# Patient Record
Sex: FEMALE | Race: ASIAN | Employment: OTHER | URBAN - METROPOLITAN AREA
[De-identification: names, ages, dates, MRNs, and addresses within clinical notes are randomized per-mention and may not be internally consistent; named-entity substitution may affect disease eponyms.]

---

## 2018-05-03 NOTE — H&P
[address] [facility] [address] Gastroenterology                                                                                                  Clinic History and Physical     Pa (Active prior to today's visit):    Current Outpatient Prescriptions:  simvastatin 10 MG Oral Tab Take 10 mg by mouth nightly. Disp:  Rfl:    Multiple Vitamins-Minerals (MULTI-VITAMIN/MINERALS) Oral Tab Take 1 tablet by mouth daily.  Disp:  Rfl:    DHA-EPA- reviewed      Labs/Imaging:     Patient's labs and imaging were reviewed and discussed with patient today. See HPI and A&P for further details.       ASSESSMENT/PLAN:   Liliya Briseno is a 59year old year-old female pt of Dr. Gordy Junior with history of hyperlipid DORI Nazario  5/3/2018

## 2018-05-08 ENCOUNTER — HOSPITAL ENCOUNTER (OUTPATIENT)
Dept: MAMMOGRAPHY | Facility: HOSPITAL | Age: 58
Discharge: HOME | End: 2018-05-08
Attending: OBSTETRICS & GYNECOLOGY
Payer: COMMERCIAL

## 2018-05-08 DIAGNOSIS — Z12.39 ENCOUNTER FOR SCREENING FOR MALIGNANT NEOPLASM OF BREAST: ICD-10-CM

## 2018-05-08 PROCEDURE — 77067 SCR MAMMO BI INCL CAD: CPT | Performed by: OBSTETRICS & GYNECOLOGY

## 2018-05-09 ENCOUNTER — OFFICE VISIT (OUTPATIENT)
Dept: GASTROENTEROLOGY | Facility: CLINIC | Age: 58
End: 2018-05-09

## 2018-05-09 ENCOUNTER — TELEPHONE (OUTPATIENT)
Dept: GASTROENTEROLOGY | Facility: CLINIC | Age: 58
End: 2018-05-09

## 2018-05-09 VITALS
SYSTOLIC BLOOD PRESSURE: 138 MMHG | BODY MASS INDEX: 26.98 KG/M2 | WEIGHT: 158 LBS | DIASTOLIC BLOOD PRESSURE: 90 MMHG | HEART RATE: 108 BPM | HEIGHT: 64 IN

## 2018-05-09 DIAGNOSIS — Z12.11 SCREENING FOR COLON CANCER: Primary | ICD-10-CM

## 2018-05-09 DIAGNOSIS — Z12.11 SCREEN FOR COLON CANCER: Primary | ICD-10-CM

## 2018-05-09 PROCEDURE — 99212 OFFICE O/P EST SF 10 MIN: CPT | Performed by: NURSE PRACTITIONER

## 2018-05-09 PROCEDURE — 99203 OFFICE O/P NEW LOW 30 MIN: CPT | Performed by: NURSE PRACTITIONER

## 2018-05-09 RX ORDER — SIMVASTATIN 10 MG
10 TABLET ORAL NIGHTLY
COMMUNITY

## 2018-05-09 RX ORDER — MULTIVIT-MIN/IRON FUM/FOLIC AC 7.5 MG-4
1 TABLET ORAL DAILY
COMMUNITY

## 2018-05-09 NOTE — H&P
Gastroenterology Attending; This is a 49-year-old female patient of Dr. Sandhya Jett who had recent consultation with DORI Dee for a colorectal screening evaluation. .  I have reviewed her thorough consultation.   I agree with the current assessment

## 2018-05-09 NOTE — TELEPHONE ENCOUNTER
Scheduled for:  Colonoscopy 32852  Provider Name: Dr. Brittni Alvarez  Date:  5/18/19  Location:  [facility]  Sedation:  IV  Time:  7:30 am, arrival 6:30 am  Prep: Colyte  Meds/Allergies Reconciled?:  DORI Dawkins reviewed  Diagnosis with codes:  Screening Z12.11  Was

## 2018-05-09 NOTE — PATIENT INSTRUCTIONS
1. Schedule colonoscopy with Dr. Kevin Alatorre w/ IV Twilight     2.  bowel prep from pharmacy - split dose Colyte     3. Continue all medications for procedure     4. Read all bowel prep instructions carefully    5.  AVOID seeds, nuts, popcorn, raw fruits an

## 2018-05-18 ENCOUNTER — SURGERY (OUTPATIENT)
Age: 58
End: 2018-05-18

## 2018-05-18 ENCOUNTER — HOSPITAL ENCOUNTER (OUTPATIENT)
Facility: HOSPITAL | Age: 58
Setting detail: HOSPITAL OUTPATIENT SURGERY
Discharge: HOME | End: 2018-05-18
Attending: INTERNAL MEDICINE | Admitting: INTERNAL MEDICINE
Payer: COMMERCIAL

## 2018-05-18 VITALS
BODY MASS INDEX: 26.98 KG/M2 | RESPIRATION RATE: 14 BRPM | OXYGEN SATURATION: 99 % | WEIGHT: 158 LBS | HEIGHT: 64 IN | DIASTOLIC BLOOD PRESSURE: 72 MMHG | SYSTOLIC BLOOD PRESSURE: 112 MMHG | HEART RATE: 60 BPM

## 2018-05-18 DIAGNOSIS — Z12.11 SCREEN FOR COLON CANCER: ICD-10-CM

## 2018-05-18 PROBLEM — K57.30 DIVERTICULOSIS LARGE INTESTINE W/O PERFORATION OR ABSCESS W/O BLEEDING: Status: ACTIVE | Noted: 2018-05-18

## 2018-05-18 PROBLEM — K64.8 INTERNAL HEMORRHOIDS: Status: ACTIVE | Noted: 2018-05-18

## 2018-05-18 PROCEDURE — G0500 MOD SEDAT ENDO SERVICE >5YRS: HCPCS | Performed by: INTERNAL MEDICINE

## 2018-05-18 PROCEDURE — 45378 DIAGNOSTIC COLONOSCOPY: CPT | Performed by: INTERNAL MEDICINE

## 2018-05-18 PROCEDURE — 0DJD8ZZ INSPECTION OF LOWER INTESTINAL TRACT, VIA NATURAL OR ARTIFICIAL OPENING ENDOSCOPIC: ICD-10-PCS | Performed by: INTERNAL MEDICINE

## 2018-05-18 RX ORDER — SODIUM CHLORIDE 0.9 % (FLUSH) 0.9 %
10 SYRINGE (ML) INJECTION AS NEEDED
Status: DISCONTINUED | OUTPATIENT
Start: 2018-05-18 | End: 2018-05-18

## 2018-05-18 RX ORDER — MIDAZOLAM HYDROCHLORIDE 1 MG/ML
INJECTION INTRAMUSCULAR; INTRAVENOUS
Status: DISCONTINUED | OUTPATIENT
Start: 2018-05-18 | End: 2018-05-18

## 2018-05-18 RX ORDER — MIDAZOLAM HYDROCHLORIDE 1 MG/ML
1 INJECTION INTRAMUSCULAR; INTRAVENOUS EVERY 5 MIN PRN
Status: DISCONTINUED | OUTPATIENT
Start: 2018-05-18 | End: 2018-05-18

## 2018-05-18 RX ORDER — SODIUM CHLORIDE, SODIUM LACTATE, POTASSIUM CHLORIDE, CALCIUM CHLORIDE 600; 310; 30; 20 MG/100ML; MG/100ML; MG/100ML; MG/100ML
INJECTION, SOLUTION INTRAVENOUS CONTINUOUS
Status: DISCONTINUED | OUTPATIENT
Start: 2018-05-18 | End: 2018-05-18

## 2018-05-18 NOTE — H&P
History & Physical Examination    Patient Name: Pastor Villarreal  MRN: [id number]  CSN: [id number]  YOB: 1960    Diagnosis: colorectal screening      Prescriptions Prior to Admission:  PATIENT SUPPLIED MEDICATION  Disp:  Rfl:     simvastatin 10 MG plan of care. I have reviewed the History and Physical done within the last 30 days. Any changes noted above.     31 Stefanie David - Gastroenterology  5/18/2018  7:27 AM

## 2018-05-18 NOTE — OPERATIVE REPORT
[facility]    PATIENT'S NAME: Kwasi Olivares   ATTENDING PHYSICIAN: Niko Conway MD   OPERATING PHYSICIAN: Niko Conway MD   PATIENT ACCOUNT#:   [id number]    LOCATION:  [facility] ENDO POOL ROOM 3 [address] 1.   Diverticular disease, uncomplicated, left-sided. 2.   Internal hemorrhoids. RECOMMENDATION:  Next screening colonoscopy in 10 years unless other signs or symptoms develop in the interim.     Dictated By Fercho Toledo MD  d: 05/18/

## 2018-05-18 NOTE — BRIEF OP NOTE
Pre-Operative Diagnosis: Screen for colon cancer     Post-Operative Diagnosis:   Diverticulosis, internal hemorrhoids     Procedure Performed:   Procedure(s):  COLONOSCOPY    Surgeon(s) and Role:     * Awa Barreto MD - Primary    Assistant(

## 2018-05-21 ENCOUNTER — TELEPHONE (OUTPATIENT)
Dept: GASTROENTEROLOGY | Facility: CLINIC | Age: 58
End: 2018-05-21

## 2018-05-21 NOTE — TELEPHONE ENCOUNTER
Entered into EPIC:Recall colon in 10 years per Dr. Alberto Salgado. Last Colon done 5/18/18, next due 5/18/28. Snapshot updated.

## 2019-07-31 ENCOUNTER — HOSPITAL ENCOUNTER (OUTPATIENT)
Dept: MAMMOGRAPHY | Facility: HOSPITAL | Age: 59
Discharge: HOME | End: 2019-07-31
Attending: OBSTETRICS & GYNECOLOGY
Payer: COMMERCIAL

## 2019-07-31 DIAGNOSIS — Z12.31 VISIT FOR SCREENING MAMMOGRAM: ICD-10-CM

## 2019-07-31 PROCEDURE — 77067 SCR MAMMO BI INCL CAD: CPT | Performed by: OBSTETRICS & GYNECOLOGY

## 2019-07-31 PROCEDURE — 77063 BREAST TOMOSYNTHESIS BI: CPT | Performed by: OBSTETRICS & GYNECOLOGY

## 2019-08-05 ENCOUNTER — HOSPITAL ENCOUNTER (OUTPATIENT)
Dept: BONE DENSITY | Age: 59
Discharge: HOME | End: 2019-08-05
Attending: OBSTETRICS & GYNECOLOGY
Payer: COMMERCIAL

## 2019-08-05 DIAGNOSIS — S82.90XA: ICD-10-CM

## 2019-08-05 PROCEDURE — 77080 DXA BONE DENSITY AXIAL: CPT | Performed by: OBSTETRICS & GYNECOLOGY

## 2021-01-22 ENCOUNTER — HOSPITAL ENCOUNTER (OUTPATIENT)
Dept: MAMMOGRAPHY | Facility: HOSPITAL | Age: 61
Discharge: HOME | End: 2021-01-22
Attending: OBSTETRICS & GYNECOLOGY
Payer: COMMERCIAL

## 2021-01-22 ENCOUNTER — HOSPITAL ENCOUNTER (OUTPATIENT)
Dept: ULTRASOUND IMAGING | Facility: HOSPITAL | Age: 61
Discharge: HOME | End: 2021-01-22
Attending: OBSTETRICS & GYNECOLOGY
Payer: COMMERCIAL

## 2021-01-22 DIAGNOSIS — N64.4 MASTODYNIA OF RIGHT BREAST: ICD-10-CM

## 2021-01-22 PROCEDURE — 77066 DX MAMMO INCL CAD BI: CPT | Performed by: OBSTETRICS & GYNECOLOGY

## 2021-01-22 PROCEDURE — 77062 BREAST TOMOSYNTHESIS BI: CPT | Performed by: OBSTETRICS & GYNECOLOGY

## 2021-01-22 PROCEDURE — 76642 ULTRASOUND BREAST LIMITED: CPT | Performed by: OBSTETRICS & GYNECOLOGY

## 2022-03-17 ENCOUNTER — HOSPITAL ENCOUNTER (OUTPATIENT)
Dept: MAMMOGRAPHY | Facility: HOSPITAL | Age: 62
Discharge: HOME | End: 2022-03-17
Attending: OBSTETRICS & GYNECOLOGY
Payer: COMMERCIAL

## 2022-03-17 DIAGNOSIS — Z12.31 ENCOUNTER FOR SCREENING MAMMOGRAM FOR MALIGNANT NEOPLASM OF BREAST: ICD-10-CM

## 2022-03-17 PROCEDURE — 77063 BREAST TOMOSYNTHESIS BI: CPT | Performed by: OBSTETRICS & GYNECOLOGY

## 2022-03-17 PROCEDURE — 77067 SCR MAMMO BI INCL CAD: CPT | Performed by: OBSTETRICS & GYNECOLOGY

## 2023-01-27 RX ORDER — SIMVASTATIN 20 MG
20 TABLET ORAL NIGHTLY
COMMUNITY

## 2023-06-12 ENCOUNTER — APPOINTMENT (OUTPATIENT)
Dept: OBGYN | Age: 63
End: 2023-06-12

## 2023-06-28 ENCOUNTER — APPOINTMENT (OUTPATIENT)
Dept: OBGYN | Age: 63
End: 2023-06-28

## 2023-07-05 ENCOUNTER — CLINICAL ABSTRACT (OUTPATIENT)
Dept: OBGYN | Age: 63
End: 2023-07-05

## 2023-07-17 ENCOUNTER — OFFICE VISIT (OUTPATIENT)
Dept: OBGYN | Age: 63
End: 2023-07-17

## 2023-07-17 VITALS
DIASTOLIC BLOOD PRESSURE: 84 MMHG | HEART RATE: 96 BPM | WEIGHT: 129.08 LBS | SYSTOLIC BLOOD PRESSURE: 134 MMHG | BODY MASS INDEX: 22.04 KG/M2 | OXYGEN SATURATION: 100 % | TEMPERATURE: 97.6 F | HEIGHT: 64 IN

## 2023-07-17 DIAGNOSIS — Z78.0 POSTMENOPAUSAL STATUS, AGE-RELATED: Primary | ICD-10-CM

## 2023-07-17 DIAGNOSIS — Z12.31 SCREENING MAMMOGRAM FOR BREAST CANCER: ICD-10-CM

## 2023-07-17 PROCEDURE — G0101 CA SCREEN;PELVIC/BREAST EXAM: HCPCS | Performed by: OBSTETRICS & GYNECOLOGY

## 2023-07-17 RX ORDER — ASCORBIC ACID 500 MG
500 TABLET ORAL DAILY
COMMUNITY

## 2023-07-17 RX ORDER — THIAMINE MONONITRATE (VIT B1) 100 MG
100 TABLET ORAL DAILY
COMMUNITY

## 2023-07-17 RX ORDER — CHLORAL HYDRATE 500 MG
CAPSULE ORAL
COMMUNITY

## 2023-07-18 ENCOUNTER — APPOINTMENT (OUTPATIENT)
Dept: OBGYN | Age: 63
End: 2023-07-18

## 2023-07-18 ENCOUNTER — HOSPITAL ENCOUNTER (OUTPATIENT)
Dept: BONE DENSITY | Facility: HOSPITAL | Age: 63
Discharge: HOME | End: 2023-07-18
Attending: OBSTETRICS & GYNECOLOGY
Payer: MEDICARE

## 2023-07-18 DIAGNOSIS — Z78.0 POSTMENOPAUSE: ICD-10-CM

## 2023-07-18 PROCEDURE — 77080 DXA BONE DENSITY AXIAL: CPT | Performed by: OBSTETRICS & GYNECOLOGY

## 2023-07-19 ENCOUNTER — HOSPITAL ENCOUNTER (OUTPATIENT)
Dept: MAMMOGRAPHY | Facility: HOSPITAL | Age: 63
Discharge: HOME | End: 2023-07-19
Attending: OBSTETRICS & GYNECOLOGY
Payer: MEDICARE

## 2023-07-19 DIAGNOSIS — Z12.31 ENCOUNTER FOR SCREENING MAMMOGRAM FOR MALIGNANT NEOPLASM OF BREAST: ICD-10-CM

## 2023-07-19 LAB — HM MAMMOGRAPHY BILATERAL: NORMAL

## 2023-07-19 PROCEDURE — 77067 SCR MAMMO BI INCL CAD: CPT | Performed by: OBSTETRICS & GYNECOLOGY

## 2023-07-19 PROCEDURE — 77063 BREAST TOMOSYNTHESIS BI: CPT | Performed by: OBSTETRICS & GYNECOLOGY

## 2024-03-19 ENCOUNTER — APPOINTMENT (OUTPATIENT)
Dept: CT IMAGING | Facility: HOSPITAL | Age: 64
End: 2024-03-19
Attending: EMERGENCY MEDICINE
Payer: MEDICARE

## 2024-03-19 ENCOUNTER — HOSPITAL ENCOUNTER (INPATIENT)
Facility: HOSPITAL | Age: 64
LOS: 1 days | Discharge: ANOTHER HOSPITAL | End: 2024-03-20
Attending: EMERGENCY MEDICINE | Admitting: HOSPITALIST
Payer: MEDICARE

## 2024-03-19 DIAGNOSIS — S06.320A INTRAPARENCHYMAL HEMATOMA OF BRAIN, LEFT, WITHOUT LOSS OF CONSCIOUSNESS, INITIAL ENCOUNTER (HCC): Primary | ICD-10-CM

## 2024-03-19 DIAGNOSIS — I16.1 HYPERTENSIVE EMERGENCY: ICD-10-CM

## 2024-03-19 LAB — GLUCOSE BLDC GLUCOMTR-MCNC: 181 MG/DL (ref 70–99)

## 2024-03-19 PROCEDURE — 70450 CT HEAD/BRAIN W/O DYE: CPT | Performed by: EMERGENCY MEDICINE

## 2024-03-20 ENCOUNTER — APPOINTMENT (OUTPATIENT)
Dept: GENERAL RADIOLOGY | Facility: HOSPITAL | Age: 64
End: 2024-03-20
Attending: EMERGENCY MEDICINE
Payer: MEDICARE

## 2024-03-20 ENCOUNTER — APPOINTMENT (OUTPATIENT)
Dept: CT IMAGING | Facility: HOSPITAL | Age: 64
End: 2024-03-20
Attending: EMERGENCY MEDICINE
Payer: MEDICARE

## 2024-03-20 ENCOUNTER — HOSPITAL ENCOUNTER (INPATIENT)
Facility: HOSPITAL | Age: 64
LOS: 9 days | Discharge: NURSING, REHAB OR LONG-TERM CARE | End: 2024-03-29
Attending: INTERNAL MEDICINE | Admitting: INTERNAL MEDICINE
Payer: MEDICARE

## 2024-03-20 VITALS
HEART RATE: 89 BPM | HEIGHT: 67 IN | RESPIRATION RATE: 12 BRPM | DIASTOLIC BLOOD PRESSURE: 82 MMHG | OXYGEN SATURATION: 97 % | SYSTOLIC BLOOD PRESSURE: 127 MMHG | WEIGHT: 126.56 LBS | TEMPERATURE: 99 F | BODY MASS INDEX: 19.87 KG/M2

## 2024-03-20 DIAGNOSIS — T14.8XXA HEMATOMA: ICD-10-CM

## 2024-03-20 DIAGNOSIS — I62.9 INTRACRANIAL BLEED (HCC): Primary | ICD-10-CM

## 2024-03-20 PROBLEM — I16.1 HYPERTENSIVE EMERGENCY: Status: ACTIVE | Noted: 2024-03-20

## 2024-03-20 PROBLEM — S06.320A INTRAPARENCHYMAL HEMATOMA OF BRAIN, LEFT, WITHOUT LOSS OF CONSCIOUSNESS, INITIAL ENCOUNTER (HCC): Status: ACTIVE | Noted: 2024-03-20

## 2024-03-20 LAB
ALBUMIN SERPL-MCNC: 4.9 G/DL (ref 3.2–4.8)
ALP LIVER SERPL-CCNC: 66 U/L
ALT SERPL-CCNC: 15 U/L
ANION GAP SERPL CALC-SCNC: 7 MMOL/L (ref 0–18)
APTT PPP: 26.5 SECONDS (ref 23.3–35.6)
AST SERPL-CCNC: 25 U/L (ref ?–34)
ATRIAL RATE: 93 BPM
BASOPHILS # BLD AUTO: 0.07 X10(3) UL (ref 0–0.2)
BASOPHILS NFR BLD AUTO: 0.8 %
BILIRUB DIRECT SERPL-MCNC: <0.1 MG/DL (ref ?–0.3)
BILIRUB SERPL-MCNC: 0.5 MG/DL (ref 0.2–1.1)
BUN BLD-MCNC: 14 MG/DL (ref 9–23)
BUN/CREAT SERPL: 17.1 (ref 10–20)
CALCIUM BLD-MCNC: 10.2 MG/DL (ref 8.7–10.4)
CHLORIDE SERPL-SCNC: 108 MMOL/L (ref 98–112)
CO2 SERPL-SCNC: 26 MMOL/L (ref 21–32)
CREAT BLD-MCNC: 0.82 MG/DL
DEPRECATED RDW RBC AUTO: 46.4 FL (ref 35.1–46.3)
EGFRCR SERPLBLD CKD-EPI 2021: 79 ML/MIN/1.73M2 (ref 60–?)
EOSINOPHIL # BLD AUTO: 0.22 X10(3) UL (ref 0–0.7)
EOSINOPHIL NFR BLD AUTO: 2.4 %
ERYTHROCYTE [DISTWIDTH] IN BLOOD BY AUTOMATED COUNT: 14 % (ref 11–15)
EST. AVERAGE GLUCOSE BLD GHB EST-MCNC: 126 MG/DL (ref 68–126)
GLUCOSE BLD-MCNC: 184 MG/DL (ref 70–99)
GLUCOSE BLDC GLUCOMTR-MCNC: 111 MG/DL (ref 70–99)
GLUCOSE BLDC GLUCOMTR-MCNC: 118 MG/DL (ref 70–99)
GLUCOSE BLDC GLUCOMTR-MCNC: 128 MG/DL (ref 70–99)
HBA1C MFR BLD: 6 % (ref ?–5.7)
HCT VFR BLD AUTO: 41.3 %
HGB BLD-MCNC: 13 G/DL
IMM GRANULOCYTES # BLD AUTO: 0.03 X10(3) UL (ref 0–1)
IMM GRANULOCYTES NFR BLD: 0.3 %
INR BLD: 0.89 (ref 0.8–1.2)
LYMPHOCYTES # BLD AUTO: 4.05 X10(3) UL (ref 1–4)
LYMPHOCYTES NFR BLD AUTO: 44.2 %
MCH RBC QN AUTO: 28.3 PG (ref 26–34)
MCHC RBC AUTO-ENTMCNC: 31.5 G/DL (ref 31–37)
MCV RBC AUTO: 90 FL
MONOCYTES # BLD AUTO: 0.63 X10(3) UL (ref 0.1–1)
MONOCYTES NFR BLD AUTO: 6.9 %
NEUTROPHILS # BLD AUTO: 4.16 X10 (3) UL (ref 1.5–7.7)
NEUTROPHILS # BLD AUTO: 4.16 X10(3) UL (ref 1.5–7.7)
NEUTROPHILS NFR BLD AUTO: 45.4 %
OSMOLALITY SERPL CALC.SUM OF ELEC: 297 MOSM/KG (ref 275–295)
P AXIS: 22 DEGREES
P-R INTERVAL: 136 MS
PLATELET # BLD AUTO: 339 10(3)UL (ref 150–450)
POTASSIUM SERPL-SCNC: 4 MMOL/L (ref 3.5–5.1)
PROT SERPL-MCNC: 7.9 G/DL (ref 5.7–8.2)
PROTHROMBIN TIME: 12.5 SECONDS (ref 11.6–14.8)
Q-T INTERVAL: 364 MS
QRS DURATION: 66 MS
QTC CALCULATION (BEZET): 452 MS
R AXIS: 44 DEGREES
RBC # BLD AUTO: 4.59 X10(6)UL
SODIUM SERPL-SCNC: 141 MMOL/L (ref 136–145)
T AXIS: 41 DEGREES
VENTRICULAR RATE: 93 BPM
WBC # BLD AUTO: 9.2 X10(3) UL (ref 4–11)

## 2024-03-20 PROCEDURE — 99223 1ST HOSP IP/OBS HIGH 75: CPT | Performed by: INTERNAL MEDICINE

## 2024-03-20 PROCEDURE — 70498 CT ANGIOGRAPHY NECK: CPT | Performed by: EMERGENCY MEDICINE

## 2024-03-20 PROCEDURE — 99223 1ST HOSP IP/OBS HIGH 75: CPT | Performed by: STUDENT IN AN ORGANIZED HEALTH CARE EDUCATION/TRAINING PROGRAM

## 2024-03-20 PROCEDURE — 71045 X-RAY EXAM CHEST 1 VIEW: CPT | Performed by: EMERGENCY MEDICINE

## 2024-03-20 PROCEDURE — 70496 CT ANGIOGRAPHY HEAD: CPT | Performed by: EMERGENCY MEDICINE

## 2024-03-20 PROCEDURE — 99291 CRITICAL CARE FIRST HOUR: CPT | Performed by: HOSPITALIST

## 2024-03-20 PROCEDURE — 99291 CRITICAL CARE FIRST HOUR: CPT | Performed by: NEUROLOGICAL SURGERY

## 2024-03-20 PROCEDURE — 70450 CT HEAD/BRAIN W/O DYE: CPT | Performed by: EMERGENCY MEDICINE

## 2024-03-20 PROCEDURE — 99223 1ST HOSP IP/OBS HIGH 75: CPT | Performed by: OTHER

## 2024-03-20 PROCEDURE — 99239 HOSP IP/OBS DSCHRG MGMT >30: CPT | Performed by: HOSPITALIST

## 2024-03-20 RX ORDER — LABETALOL HYDROCHLORIDE 5 MG/ML
10 INJECTION, SOLUTION INTRAVENOUS EVERY 10 MIN PRN
Status: COMPLETED | OUTPATIENT
Start: 2024-03-20 | End: 2024-03-21

## 2024-03-20 RX ORDER — ACETAMINOPHEN 500 MG
500 TABLET ORAL EVERY 4 HOURS PRN
Status: DISCONTINUED | OUTPATIENT
Start: 2024-03-20 | End: 2024-03-22

## 2024-03-20 RX ORDER — BISACODYL 10 MG
10 SUPPOSITORY, RECTAL RECTAL
Status: DISCONTINUED | OUTPATIENT
Start: 2024-03-20 | End: 2024-03-20

## 2024-03-20 RX ORDER — ONDANSETRON 2 MG/ML
4 INJECTION INTRAMUSCULAR; INTRAVENOUS EVERY 6 HOURS PRN
Status: SHIPPED | COMMUNITY
Start: 2024-03-20 | End: 2024-03-29

## 2024-03-20 RX ORDER — BISACODYL 10 MG
10 SUPPOSITORY, RECTAL RECTAL
Status: DISCONTINUED | OUTPATIENT
Start: 2024-03-20 | End: 2024-03-29

## 2024-03-20 RX ORDER — SODIUM CHLORIDE 9 MG/ML
INJECTION, SOLUTION INTRAVENOUS CONTINUOUS
Status: DISCONTINUED | OUTPATIENT
Start: 2024-03-20 | End: 2024-03-26

## 2024-03-20 RX ORDER — SODIUM CHLORIDE 9 MG/ML
INJECTION, SOLUTION INTRAVENOUS CONTINUOUS
Status: SHIPPED | COMMUNITY
Start: 2024-03-20 | End: 2024-03-29

## 2024-03-20 RX ORDER — LABETALOL HYDROCHLORIDE 5 MG/ML
INJECTION, SOLUTION INTRAVENOUS
Status: COMPLETED
Start: 2024-03-20 | End: 2024-03-20

## 2024-03-20 RX ORDER — ACETAMINOPHEN 325 MG/1
650 TABLET ORAL EVERY 4 HOURS PRN
Status: DISCONTINUED | OUTPATIENT
Start: 2024-03-20 | End: 2024-03-20

## 2024-03-20 RX ORDER — AMLODIPINE BESYLATE 5 MG/1
5 TABLET ORAL DAILY
Status: SHIPPED | COMMUNITY
Start: 2024-03-20

## 2024-03-20 RX ORDER — BISACODYL 10 MG
10 SUPPOSITORY, RECTAL RECTAL
Status: SHIPPED | COMMUNITY
Start: 2024-03-20 | End: 2024-03-29

## 2024-03-20 RX ORDER — ACETAMINOPHEN 325 MG/1
650 TABLET ORAL EVERY 4 HOURS PRN
Status: SHIPPED | COMMUNITY
Start: 2024-03-20

## 2024-03-20 RX ORDER — HYDRALAZINE HYDROCHLORIDE 20 MG/ML
10 INJECTION INTRAMUSCULAR; INTRAVENOUS EVERY 2 HOUR PRN
Status: CANCELLED | OUTPATIENT
Start: 2024-03-20

## 2024-03-20 RX ORDER — POLYETHYLENE GLYCOL 3350 17 G/17G
17 POWDER, FOR SOLUTION ORAL DAILY PRN
Status: DISCONTINUED | OUTPATIENT
Start: 2024-03-20 | End: 2024-03-20

## 2024-03-20 RX ORDER — ONDANSETRON 2 MG/ML
4 INJECTION INTRAMUSCULAR; INTRAVENOUS EVERY 6 HOURS PRN
Status: CANCELLED | OUTPATIENT
Start: 2024-03-20

## 2024-03-20 RX ORDER — AMLODIPINE BESYLATE 5 MG/1
5 TABLET ORAL DAILY
Status: DISCONTINUED | OUTPATIENT
Start: 2024-03-20 | End: 2024-03-20

## 2024-03-20 RX ORDER — LABETALOL HYDROCHLORIDE 5 MG/ML
20 INJECTION, SOLUTION INTRAVENOUS ONCE
Status: COMPLETED | OUTPATIENT
Start: 2024-03-20 | End: 2024-03-20

## 2024-03-20 RX ORDER — METOCLOPRAMIDE HYDROCHLORIDE 5 MG/ML
10 INJECTION INTRAMUSCULAR; INTRAVENOUS EVERY 8 HOURS PRN
Status: CANCELLED | OUTPATIENT
Start: 2024-03-20

## 2024-03-20 RX ORDER — POLYETHYLENE GLYCOL 3350 17 G/17G
17 POWDER, FOR SOLUTION ORAL DAILY PRN
Status: DISCONTINUED | OUTPATIENT
Start: 2024-03-20 | End: 2024-03-29

## 2024-03-20 RX ORDER — LABETALOL HYDROCHLORIDE 5 MG/ML
10 INJECTION, SOLUTION INTRAVENOUS EVERY 10 MIN PRN
Status: DISCONTINUED | OUTPATIENT
Start: 2024-03-20 | End: 2024-03-20

## 2024-03-20 RX ORDER — PRAVASTATIN SODIUM 20 MG
20 TABLET ORAL NIGHTLY
Status: DISCONTINUED | OUTPATIENT
Start: 2024-03-20 | End: 2024-03-20

## 2024-03-20 RX ORDER — SENNOSIDES 8.6 MG
17.2 TABLET ORAL NIGHTLY PRN
Status: DISCONTINUED | OUTPATIENT
Start: 2024-03-20 | End: 2024-03-29

## 2024-03-20 RX ORDER — ACETAMINOPHEN 650 MG/1
650 SUPPOSITORY RECTAL EVERY 4 HOURS PRN
Status: DISCONTINUED | OUTPATIENT
Start: 2024-03-20 | End: 2024-03-22

## 2024-03-20 RX ORDER — SODIUM CHLORIDE 9 MG/ML
INJECTION, SOLUTION INTRAVENOUS CONTINUOUS
Status: DISCONTINUED | OUTPATIENT
Start: 2024-03-20 | End: 2024-03-20

## 2024-03-20 RX ORDER — HYDRALAZINE HYDROCHLORIDE 20 MG/ML
10 INJECTION INTRAMUSCULAR; INTRAVENOUS EVERY 2 HOUR PRN
Status: DISCONTINUED | OUTPATIENT
Start: 2024-03-20 | End: 2024-03-29

## 2024-03-20 RX ORDER — ACETAMINOPHEN 325 MG/1
650 TABLET ORAL EVERY 4 HOURS PRN
Status: DISCONTINUED | OUTPATIENT
Start: 2024-03-20 | End: 2024-03-22

## 2024-03-20 RX ORDER — METOCLOPRAMIDE HYDROCHLORIDE 5 MG/ML
10 INJECTION INTRAMUSCULAR; INTRAVENOUS EVERY 8 HOURS PRN
Status: DISCONTINUED | OUTPATIENT
Start: 2024-03-20 | End: 2024-03-29

## 2024-03-20 RX ORDER — LABETALOL HYDROCHLORIDE 5 MG/ML
10 INJECTION, SOLUTION INTRAVENOUS EVERY 10 MIN PRN
Status: CANCELLED | OUTPATIENT
Start: 2024-03-20

## 2024-03-20 RX ORDER — HYDRALAZINE HYDROCHLORIDE 20 MG/ML
10 INJECTION INTRAMUSCULAR; INTRAVENOUS EVERY 2 HOUR PRN
Status: DISCONTINUED | OUTPATIENT
Start: 2024-03-20 | End: 2024-03-20

## 2024-03-20 RX ORDER — MIDAZOLAM HYDROCHLORIDE 1 MG/ML
2 INJECTION INTRAMUSCULAR; INTRAVENOUS
Status: DISCONTINUED | OUTPATIENT
Start: 2024-03-20 | End: 2024-03-20

## 2024-03-20 RX ORDER — SENNOSIDES 8.6 MG
17.2 TABLET ORAL NIGHTLY PRN
Status: DISCONTINUED | OUTPATIENT
Start: 2024-03-20 | End: 2024-03-20

## 2024-03-20 RX ORDER — ACETAMINOPHEN 325 MG/1
650 TABLET ORAL EVERY 4 HOURS PRN
Status: CANCELLED | OUTPATIENT
Start: 2024-03-20

## 2024-03-20 RX ORDER — ENEMA 19; 7 G/133ML; G/133ML
1 ENEMA RECTAL ONCE AS NEEDED
Status: DISCONTINUED | OUTPATIENT
Start: 2024-03-20 | End: 2024-03-29

## 2024-03-20 RX ORDER — ACETAMINOPHEN 650 MG/1
650 SUPPOSITORY RECTAL EVERY 4 HOURS PRN
Status: CANCELLED | OUTPATIENT
Start: 2024-03-20

## 2024-03-20 RX ORDER — SODIUM CHLORIDE 9 MG/ML
INJECTION, SOLUTION INTRAVENOUS CONTINUOUS
Status: CANCELLED | OUTPATIENT
Start: 2024-03-20

## 2024-03-20 RX ORDER — MELATONIN
3 NIGHTLY PRN
Status: DISCONTINUED | OUTPATIENT
Start: 2024-03-20 | End: 2024-03-29

## 2024-03-20 RX ORDER — ONDANSETRON 2 MG/ML
4 INJECTION INTRAMUSCULAR; INTRAVENOUS EVERY 6 HOURS PRN
Status: DISCONTINUED | OUTPATIENT
Start: 2024-03-20 | End: 2024-03-20

## 2024-03-20 RX ORDER — ACETAMINOPHEN 650 MG/1
650 SUPPOSITORY RECTAL EVERY 4 HOURS PRN
Status: DISCONTINUED | OUTPATIENT
Start: 2024-03-20 | End: 2024-03-20

## 2024-03-20 RX ORDER — METOCLOPRAMIDE HYDROCHLORIDE 5 MG/ML
10 INJECTION INTRAMUSCULAR; INTRAVENOUS EVERY 8 HOURS PRN
Status: DISCONTINUED | OUTPATIENT
Start: 2024-03-20 | End: 2024-03-20

## 2024-03-20 RX ORDER — POLYETHYLENE GLYCOL 3350 17 G/17G
17 POWDER, FOR SOLUTION ORAL DAILY PRN
Status: SHIPPED | COMMUNITY
Start: 2024-03-20 | End: 2024-03-29

## 2024-03-20 RX ORDER — LABETALOL HYDROCHLORIDE 5 MG/ML
10 INJECTION, SOLUTION INTRAVENOUS ONCE
Status: COMPLETED | OUTPATIENT
Start: 2024-03-20 | End: 2024-03-20

## 2024-03-20 RX ORDER — ONDANSETRON 2 MG/ML
4 INJECTION INTRAMUSCULAR; INTRAVENOUS EVERY 6 HOURS PRN
Status: DISCONTINUED | OUTPATIENT
Start: 2024-03-20 | End: 2024-03-29

## 2024-03-20 RX ORDER — ENEMA 19; 7 G/133ML; G/133ML
1 ENEMA RECTAL ONCE AS NEEDED
Status: DISCONTINUED | OUTPATIENT
Start: 2024-03-20 | End: 2024-03-20

## 2024-03-20 NOTE — TRANSFER CENTER NOTE
DIRECT ADMISSION    Admitting MD: Dr. Ailyn Funes  Called in by: Torie Zendejas/Mauro  Call back #: [phone]  Date of admission: 3/24/2024  Diagnosis: ICH  Specialist MD:  Cristiana  Hospitalist assigned: EDW hospitalist  Type of admission: INPT  Type of bed: ICU  Time of admission: 1930  Insurance Verification complete: Yes     Pt coming from [facility] room 238 and going to EDW room 6621.  Accepted by Dr. Funes.  Arranged [facility].  ETA is 7 pm.      Informed, RN.  PCS form completed.

## 2024-03-20 NOTE — CONSULTS
[facility]  [address] [place] [address], [address]  [facility] [address]  [phone]            Leanne Ching Patient Status:  Inpatient    1960 MRN [id number]   Location [facility] 2W/SW Attending Brooke Moreno MD   Hosp Day # 0 PCP Shelby Frost MD     Date of Admission:  3/19/2024  Date of Consult:  3/20/2024  Reason for Consultation:   Aphasia and right-sided weakness.    History of Present Illness:   Patient is a 63 year old female who was admitted to the hospital for Intraparenchymal hematoma of brain, left, without loss of consciousness, initial encounter (HCC):  Patient with a history of hyperlipidemia, but apparently no hypertension.  Most of the history is obtained from the chart since patient is severely aphasic.  In 2024 patient was admitted to the hospital with having difficulty of speaking as well as sudden onset of right upper extremity weakness.  She was not on any IV thrombolytic therapy.  On presentation to the emergency room she was not able to move her right upper extremity, significant difficulties with communication.  CT of the head showed left frontal intraparenchymal hematoma with some mild mass effect    Past Medical History  Past Medical History:   Diagnosis Date    Diverticulosis large intestine w/o perforation or abscess w/o bleeding 2018    High cholesterol     Hyperlipidemia     Internal hemorrhoids 2018       Past Surgical History  Past Surgical History:   Procedure Laterality Date    ABLATION      COLONOSCOPY N/A 2018    Procedure: COLONOSCOPY;  Surgeon: Jacinda Cruz MD;  Location: [facility] ENDOSCOPY          x 2       Family History  Family History   Problem Relation Age of Onset    Cancer Paternal Grandmother 70        UTERINE       Social History  Pediatric History   Patient Parents    Not on file     Other Topics Concern    Not on file   Social History Narrative    Not on file            Current Medications:  Current Facility-Administered Medications   Medication Dose Route Frequency    niCARdipine in sodium chloride 0.86% (carDENE) 20 mg/200mL infusion premix  5-15 mg/hr Intravenous Continuous    sodium chloride 0.9% infusion   Intravenous Continuous    acetaminophen (Tylenol) tab 650 mg  650 mg Oral Q4H PRN    Or    acetaminophen (Tylenol) rectal suppository 650 mg  650 mg Rectal Q4H PRN    labetalol (Trandate) 5 mg/mL injection 10 mg  10 mg Intravenous Q10 Min PRN    hydrALAzine (Apresoline) 20 mg/mL injection 10 mg  10 mg Intravenous Q2H PRN    ondansetron (Zofran) 4 MG/2ML injection 4 mg  4 mg Intravenous Q6H PRN    Or    metoclopramide (Reglan) 5 mg/mL injection 10 mg  10 mg Intravenous Q8H PRN    polyethylene glycol (PEG 3350) (Miralax) 17 g oral packet 17 g  17 g Oral Daily PRN    sennosides (Senokot) tab 17.2 mg  17.2 mg Oral Nightly PRN    bisacodyl (Dulcolax) 10 MG rectal suppository 10 mg  10 mg Rectal Daily PRN    fleet enema (Fleet) 7-19 GM/118ML rectal enema 133 mL  1 enema Rectal Once PRN    midazolam (Versed) 2 MG/2ML injection 2 mg  2 mg Intravenous Q15 Min PRN    pantoprazole (Protonix) 40 mg in sodium chloride 0.9% PF 10 mL IV push  40 mg Intravenous Daily     Medications Prior to Admission   Medication Sig    simvastatin 10 MG Oral Tab Take 1 tablet (10 mg total) by mouth nightly.    PEG 3350-KCl-NaBcb-NaCl-NaSulf (COLYTE WITH FLAVOR PACKS) 240 g Oral Recon Soln As directed per GI consult notes       Allergies  No Known Allergies    Review of Systems:   As in HPI, the rest of the 14 system review was done and was negative    Physical Exam:     Vitals:    03/20/24 0345 03/20/24 0415 03/20/24 0458 03/20/24 0500   BP: (!) 144/92 141/82 116/69 151/81   Pulse: 79 68 88 90   Resp: 14 15 17 19   Temp:   98.2 °F (36.8 °C)    TempSrc:   Temporal    SpO2: 98% 97% 99% 98%   Weight:       Height:           General: No apparent distress, well nourished, well groomed.  Head-  Normocephalic, atraumatic  Eyes- No redness or swelling  ENT- Hearing intake, smell preserved, normal glutition  Neck- No masses or adenopathy  Cv: pulses were palpable and normal, no cyanosis or edema     Neurological:     Mental Status- Alert severe nonfluent aphasia, still able to understand most of the commands    Cranial Nerves:  II.- Visual fields full to confrontation?       III, IV, VI- EOM intact, ANGEL  V. Facial sensation intact  VII. Face shows right facial weakness in the lower part of the face  VIII. Hearing intact.  IX. Pallet elevates symmetrically.    XII. Tongue is midline    Motor Exam:  Muscle tone normal  No atrophy or fasciculations  Strength- upper extremities 5/5 proximally and distally in the left arm, 0 out of 5 in the right arm                  - lower  extremities 5/5 proximally and distally in the left leg, 4+ out of 5 in the right leg.      Coordination:  Finger to nose on the left side  Rapid alternating movements intact      Results:     Laboratory Data:  Lab Results   Component Value Date    WBC 9.2 03/19/2024    HGB 13.0 03/19/2024    HCT 41.3 03/19/2024    .0 03/19/2024    CREATSERUM 0.82 03/19/2024    BUN 14 03/19/2024     03/19/2024    K 4.0 03/19/2024     03/19/2024    CO2 26.0 03/19/2024     (H) 03/19/2024    CA 10.2 03/19/2024    ALB 4.9 (H) 03/19/2024    ALKPHO 66 03/19/2024    TP 7.9 03/19/2024    AST 25 03/19/2024    ALT 15 03/19/2024    PTT 26.5 03/19/2024    INR 0.89 03/19/2024    PTP 12.5 03/19/2024         Imaging:    No results found.  EKG    Result Date: 3/20/2024  Normal sinus rhythm Nonspecific ST and T wave abnormality Abnormal ECG No previous ECGs found in [facility]     CT of the head was independently reviewed, hemorrhage in left frontal lobe noted.    Impression:     Intraparenchymal hematoma of brain, left, without loss of consciousness, initial encounter ([facility])  Unclear etiology, apparently there is no history of hypertension.  Possibility  of vascular malformation could be entertained.    Intracerebral Hemorrhage,      - CT Head as above, repeat in am   - SBP goal < 160   - Nsg following, appreciate recs    - PT/OT/SLP consulted    Repeat CT of the head in the morning as well as CT angiogram report is still pending.      Thank you for allowing me to participate in the care of your patient.    Carl Portillo MD  3/20/2024

## 2024-03-20 NOTE — ED PROVIDER NOTES
Patient Seen in: [facility] Emergency Department      History   No chief complaint on file.    Stated Complaint: facial droop/R sided weakness    Subjective:   HPI    Relatively healthy 63-year-old female with listed history not on antiplatelets or anticoagulants who was fine this evening ate dinner her  went up to bed as they have an early morning flight to [place] when she states around 1030 she started having symptoms of difficulty speaking and right arm weakness.  She has never experienced this before.  She states before 1030 tonight she had no symptoms.  It was somewhat abrupt in onset.  She denies any visual field changes.  She states her right leg feels fine.  No recent injury.  No recent surgery or trauma or bleeding.    Objective:   Past Medical History:   Diagnosis Date    Diverticulosis large intestine w/o perforation or abscess w/o bleeding 2018    High cholesterol     Hyperlipidemia     Internal hemorrhoids 2018              Past Surgical History:   Procedure Laterality Date    ABLATION      COLONOSCOPY N/A 2018    Procedure: COLONOSCOPY;  Surgeon: Jacinda Cruz MD;  Location: [facility] ENDOSCOPY          x 2                Social History     Socioeconomic History    Marital status:    Tobacco Use    Smoking status: Never    Smokeless tobacco: Never   Substance and Sexual Activity    Alcohol use: No    Drug use: No     Social Determinants of Health     Food Insecurity: No Food Insecurity (3/20/2024)    Food Insecurity     Food Insecurity: Never true   Transportation Needs: No Transportation Needs (3/20/2024)    Transportation Needs     Lack of Transportation: No   Housing Stability: Low Risk  (3/20/2024)    Housing Stability     Housing Instability: No              Review of Systems    Positive for stated complaint: facial droop/R sided weakness  Other systems are as noted in HPI.  Constitutional and vital signs reviewed.      All other systems  reviewed and negative except as noted above.    Physical Exam     ED Triage Vitals   BP 03/19/24 2347 (!) 192/106   Pulse 03/19/24 2347 119   Resp 03/19/24 2347 22   Temp 03/20/24 0458 98.2 °F (36.8 °C)   Temp src 03/20/24 0458 Temporal   SpO2 03/19/24 2347 99 %   O2 Device 03/19/24 2347 None (Room air)       Current:/82 (BP Location: Left arm)   Pulse 89   Temp 98.6 °F (37 °C) (Temporal)   Resp 12   Ht 170.2 cm (5' 7\")   Wt 57.4 kg   SpO2 97%   BMI 19.82 kg/m²         Physical Exam    Constitutional: Oriented to person, place, and time.  Appears well-developed. No distress.   Head: Normocephalic and atraumatic.   Eyes: Conjunctivae are normal. Pupils are equal, round, and reactive to light.   Neck: Normal range of motion. Neck supple.   Cardiovascular: Normal rate, regular rhythm and intact distal pulses.    Pulmonary/Chest: Effort normal. No respiratory distress.   Abdominal: Soft. There is no tenderness. There is no guarding.   Musculoskeletal: Normal range of motion. No edema or tenderness.   Neurological: Awake and alert, difficult to assess orientation as patient is notably aphasic.  She has some mild facial droop.  She has noted expressive aphasia.  She has significant right arm drift with only slight resistance against gravity.  She has minimal  strength on the right compared to the left.  She has mild right leg drift.  Plantarflexion strength is slightly diminished as well.  She denies sensory changes.  Skin: Skin is warm and dry.   Psychiatric: Normal mood and affect.  Behavior is normal.   Nursing note and vitals reviewed.    Differential diagnosis includes acute ischemic CVA, less likely intracranial hemorrhage or mass.      ED Course     Labs Reviewed   BASIC METABOLIC PANEL (8) - Abnormal; Notable for the following components:       Result Value    Glucose 184 (*)     Calculated Osmolality 297 (*)     All other components within normal limits   HEPATIC FUNCTION PANEL (7) - Abnormal;  Notable for the following components:    Albumin 4.9 (*)     All other components within normal limits   HEMOGLOBIN A1C - Abnormal; Notable for the following components:    HgbA1C 6.0 (*)     All other components within normal limits   POCT GLUCOSE - Abnormal; Notable for the following components:    POC Glucose  181 (*)     All other components within normal limits   POCT GLUCOSE - Abnormal; Notable for the following components:    POC Glucose  128 (*)     All other components within normal limits   POCT GLUCOSE - Abnormal; Notable for the following components:    POC Glucose  111 (*)     All other components within normal limits   POCT GLUCOSE - Abnormal; Notable for the following components:    POC Glucose  118 (*)     All other components within normal limits   CBC W/ DIFFERENTIAL - Abnormal; Notable for the following components:    RDW-SD 46.4 (*)     Lymphocyte Absolute 4.05 (*)     All other components within normal limits   PROTHROMBIN TIME (PT) - Normal   PTT, ACTIVATED - Normal   CBC WITH DIFFERENTIAL WITH PLATELET    Narrative:     The following orders were created for panel order CBC With Differential With Platelet.  Procedure                               Abnormality         Status                     ---------                               -----------         ------                     CBC W/ DIFFERENTIAL[[id number]]          Abnormal            Final result                 Please view results for these tests on the individual orders.   RAINBOW DRAW LAVENDER   RAINBOW DRAW LIGHT GREEN   RAINBOW DRAW BLUE     EKG    Rate, intervals and axes as noted on EKG Report.  Rate: 93  Rhythm: Sinus Rhythm  Reading: No acute ischemic changes           TNK/ NI Documentation:    Date/Time last known well:   3/19/265203:30 PM    NIHSS on presentation: N/A     No chief complaint on file.    IV Tenecteplase (TNK) administered: No; Patient is not a Candidate for IV TNK due to: Intracranial hemorrhage on CT  Head    Candidate for Endovascular thrombectomy (EVT): No; Patient is not a candidate for Endovascular Thrombectomy due to: Large intraparenchymal hemorrhage     Disposition: There is no disposition on file for this visit.         CT head without contrast    IMPRESSION:    Approximately 3.7 x 3.7 x 5.6 cm left frontal intraparenchymal hematoma with surrounding vasogenic edema, resulting in mild mass-effect.  Approximately 0.3 cm of rightward midline shift.  No cerebral herniation.    Case discussed with  Dr. Angeles at 0105 ET    Bryant Basilio M.D.  This report has been electronically signed and verified by the Radiologist whose name is printed above.    DD:  03/19/2024/DT:  03/20/2024        AP chest x-ray    IMPRESSION:    No acute pulmonary process.    The results were faxed/finalized only at 0145 ET. If you would like to discuss this case directly please call [phone] (extension [phone]).  If you can't reach me at this number, do not leave a voicemail.  Please call [phone] ext [phone] and ask for the next available Radiologist.    Bryant Basilio M.D.  This report has been electronically signed and verified by the Radiologist whose name is printed above.    DD:  03/20/2024/DT:  03/20/2024           [facility]        Admission disposition: 3/20/2024 12:26 AM                                        Medical Decision Making  Patient arrived with acute CVA symptoms.  She was sent emergently to CT for CT and CTA.  However, unfortunately the CT plane brain shows evidence of a large intraparenchymal hemorrhage with a slight amount of midline shift.  Patient's aphasia has got a little worse here after returning from CT.  I did describe the findings to the patient and her .  She has not gotten 1 dose of labetalol for continued elevated blood pressure in the 170s systolic.  Plan will be to repeat this and redosed the labetalol and then likely start nicardipine if it still remains elevated.I just messaged Dr. Wade to  review the CT who is on for neurosurgery [facility].  He agreed with this.  Every hour neurochecks in the CCU.  Repeat head CT in the morning which I ordered.  Neurology was updated.  Pulmonary critical care replaced on consult.  I did message the hospitalist.  The patient is at extreme risk for acute decompensation in the next few hours.  She will need to be watched and managed very carefully.    Problems Addressed:  Hypertensive emergency: acute illness or injury with systemic symptoms that poses a threat to life or bodily functions  Intraparenchymal hematoma of brain, left, without loss of consciousness, initial encounter (HCC): acute illness or injury with systemic symptoms that poses a threat to life or bodily functions    Amount and/or Complexity of Data Reviewed  Independent Historian: spouse     Details: Patient is notably aphasic so  gives most of the history noted above  Labs: ordered. Decision-making details documented in ED Course.  Radiology: ordered and independent interpretation performed. Decision-making details documented in ED Course.     Details: By my gross review of the plain head CT there is evidence of a large left frontal intraparenchymal hemorrhage      By my review there is no obvious evidence of pulmonary edema, pleural effusion, pneumothorax or focal infiltrate on x-ray imaging.  ECG/medicine tests: ordered and independent interpretation performed. Decision-making details documented in ED Course.    Risk  Drug therapy requiring intensive monitoring for toxicity.  Decision regarding hospitalization.    Critical Care  Total time providing critical care: minutes (I spent a total of 75 minutes of critical care time in obtaining history, performing a physical exam, bedside monitoring of interventions, collecting and interpreting tests and discussion with consultants but not including time spent performing procedures.)        Disposition and Plan     Clinical Impression:  1.  Intraparenchymal hematoma of brain, left, without loss of consciousness, initial encounter ([facility])    2. Hypertensive emergency         Disposition:  Admit  3/20/2024 12:26 am    Follow-up:  Carl Portillo MD  [address] [place] [id number]  [phone]    Follow up in 1 month(s)  Neuro/Stroke follow up    We recommend that you schedule follow up care with a primary care provider within the next three months to obtain basic health screening including reassessment of your blood pressure.      Medications Prescribed:  Discharge Medication List as of 3/20/2024  6:41 PM        START taking these medications    Details   niCARdipine in sodium chloride 0.86% 20 mg/200mL Intravenous Solution Inject 5-15 mg/hr into the vein continuous., Historical      acetaminophen 325 MG Oral Tab Take 2 tablets (650 mg total) by mouth every 4 (four) hours as needed., Historical      ondansetron 4 MG/2ML Injection Solution Inject 2 mL (4 mg total) into the vein every 6 (six) hours as needed., Historical      polyethylene glycol, PEG 3350, 17 g Oral Powd Pack Take 17 g by mouth daily as needed (If no bowel movement in last 24 hours)., Historical      sennosides 17.2 MG Oral Tab Take 1 tablet (17.2 mg total) by mouth nightly as needed (constipation, as needed if no bowel movement that day)., Historical      bisacodyl 10 MG Rectal Suppos Place 1 suppository (10 mg total) rectally daily as needed., Historical      sodium chloride 0.9% PF 0.9% SOLN 10 mL with pantoprazole 40 MG SOLR 40 mg Inject 40 mg into the vein daily., Historical      amLODIPine 5 MG Oral Tab Take 1 tablet (5 mg total) by mouth daily., Historical      sodium chloride 0.9% Intravenous Inject into the vein continuous., Historical                               Hospital Problems       Present on Admission  Date Reviewed: 6/1/2021            ICD-10-CM Noted POA    * (Principal) Intraparenchymal hematoma of brain, left, without loss of consciousness, initial  encounter (HCC) S06.320A 3/20/2024 Unknown    Hypertensive emergency I16.1 3/20/2024 Unknown

## 2024-03-20 NOTE — PHYSICAL THERAPY NOTE
PT order received, chart reviewed. Patient will not be seen today, pt has follow up CT tomorrow to assess stability of bleed. Per neurosurgery note today:    \"62 y/o female without a pertinent hx of HTN who presented to the ED overnight with expressive aphasia at right sided weakness (UE>LE), CT with left large acute IPH with mass affect and midline shift     Plan:     1.  Hold anticoagulation/antiplatelet therapy  2.  Systolic blood pressure less than 140  3.  Medical management per critical care and hospitalist  4.  Repeat head CT in the morning.  If a change in neurologic status, complete stat head CT and notify neurosurgery.  5.  Dr. Wade to discuss further with Dr. Romero if angiogram is recommended to rule out underlying lesion  6.  Discussed with Dr. Wade\"    Will check back tomorrow.

## 2024-03-20 NOTE — ED INITIAL ASSESSMENT (HPI)
Started not making sense and not acting right a little while ago. Right facial droop noticed. Right arm flaccid. Patient following directions but speech is slurred.   Last known well 2230, around 2300 she went to her  and touched her face where he saw she was not communicating well and noticed a facial droop

## 2024-03-20 NOTE — PLAN OF CARE
Problem: Patient Centered Care  Goal: Patient preferences are identified and integrated in the patient's plan of care  Description: Interventions:  - What would you like us to know as we care for you? Patient is from home with    - Provide timely, complete, and accurate information to patient/family  - Incorporate patient and family knowledge, values, beliefs, and cultural backgrounds into the planning and delivery of care  - Encourage patient/family to participate in care and decision-making at the level they choose  - Honor patient and family perspectives and choices  Outcome: Progressing     Problem: Diabetes/Glucose Control  Goal: Glucose maintained within prescribed range  Description: INTERVENTIONS:  - Monitor Blood Glucose as ordered  - Assess for signs and symptoms of hyperglycemia and hypoglycemia  - Administer ordered medications to maintain glucose within target range  - Assess barriers to adequate nutritional intake and initiate nutrition consult as needed  - Instruct patient on self management of diabetes  Outcome: Progressing     Problem: Patient/Family Goals  Goal: Patient/Family Long Term Goal  Description: Patient's Long Term Goal: To feel better     Interventions:  - See additional Care Plan goals for specific interventions  Outcome: Progressing  Goal: Patient/Family Short Term Goal  Description: Patient's Short Term Goal: To go home    Interventions:  - See additional Care Plan goals for specific interventions  Outcome: Progressing     Problem: CARDIOVASCULAR - ADULT  Goal: Maintains optimal cardiac output and hemodynamic stability  Description: INTERVENTIONS:  - Monitor vital signs, rhythm, and trends  - Monitor for bleeding, hypotension and signs of decreased cardiac output  - Evaluate effectiveness of vasoactive medications to optimize hemodynamic stability  - Monitor arterial and/or venous puncture sites for bleeding and/or hematoma  - Assess quality of pulses, skin color and  temperature  - Assess for signs of decreased coronary artery perfusion - ex. Angina  - Evaluate fluid balance, assess for edema, trend weights  Outcome: Progressing  Goal: Absence of cardiac arrhythmias or at baseline  Description: INTERVENTIONS:  - Continuous cardiac monitoring, monitor vital signs, obtain 12 lead EKG if indicated  - Evaluate effectiveness of antiarrhythmic and heart rate control medications as ordered  - Initiate emergency measures for life threatening arrhythmias  - Monitor electrolytes and administer replacement therapy as ordered  Outcome: Progressing     Problem: METABOLIC/FLUID AND ELECTROLYTES - ADULT  Goal: Glucose maintained within prescribed range  Description: INTERVENTIONS:  - Monitor Blood Glucose as ordered  - Assess for signs and symptoms of hyperglycemia and hypoglycemia  - Administer ordered medications to maintain glucose within target range  - Assess barriers to adequate nutritional intake and initiate nutrition consult as needed  - Instruct patient on self management of diabetes  Outcome: Progressing     Problem: NEUROLOGICAL - ADULT  Goal: Achieves stable or improved neurological status  Description: INTERVENTIONS  - Assess for and report changes in neurological status  - Initiate measures to prevent increased intracranial pressure  - Maintain blood pressure and fluid volume within ordered parameters to optimize cerebral perfusion and minimize risk of hemorrhage  - Monitor temperature, glucose, and sodium. Initiate appropriate interventions as ordered  Outcome: Progressing  Goal: Absence of seizures  Description: INTERVENTIONS  - Monitor for seizure activity  - Administer anti-seizure medications as ordered  - Monitor neurological status  Outcome: Progressing  Goal: Achieves maximal functionality and self care  Description: INTERVENTIONS  - Monitor swallowing and airway patency with patient fatigue and changes in neurological status  - Encourage and assist patient to increase  activity and self care with guidance from PT/OT  - Encourage visually impaired, hearing impaired and aphasic patients to use assistive/communication devices  Outcome: Progressing     Problem: Impaired Swallowing  Goal: Minimize aspiration risk  Description: Interventions:  - Patient should be alert and upright for all feedings (90 degrees preferred)  - Offer food and liquids at a slow rate  - No straws  - Encourage small bites of food and small sips of liquid  - Offer pills one at a time, crush or deliver with applesauce as needed  - Discontinue feeding and notify MD (or speech pathologist) if coughing or persistent throat clearing or wet/gurgly vocal quality is noted  Outcome: Progressing     Problem: Impaired Communication  Goal: Patient will achieve maximal communication potential  Description: Interventions:  - Encourage use of alternative/augmentative communication to express basic wants and needs (use of communication/letter board/or smartphone/tablet/handwriting)  - Ask \"yes/no\" questions to facilitate patient's ability to communicate wants and needs  Outcome: Progressing     Problem: SAFETY ADULT - FALL  Goal: Free from fall injury  Description: INTERVENTIONS:  - Assess pt frequently for physical needs  - Identify cognitive and physical deficits and behaviors that affect risk of falls.  - [place] fall precautions as indicated by assessment.  - Educate pt/family on patient safety including physical limitations  - Instruct pt to call for assistance with activity based on assessment  - Modify environment to reduce risk of injury  - Provide assistive devices as appropriate  - Consider OT/PT consult to assist with strengthening/mobility  - Encourage toileting schedule  Outcome: Progressing   Patient came in to ED with aphasia, right sided facial droop. CT of head showed large intraparenchymal hemorrhage. Neurosurgery and Neurology on consult. Patient is NPO. ST/OT/PT ordered. Patient was hypertensive in ED,  stable now. Q1 hour neurochecks. Patient is aphasic with right sided weakness and right sided facial droop.  Repeat CT of brain this morning. VSS. Will continue to monitor patient.

## 2024-03-20 NOTE — PLAN OF CARE
Neuro checks consistent throughout shift. Pt is alert, able to respond to yes/no questions. Noted to have right sided facial droop and expressive aphasia. Right sided weakness present. Unable to move RUE to command, although at times will move spontaneously. Right sided  absent. Able to move RLE to command and left sided extremities. Remains on RA, NSR on tele. SLP eval in AM; per SLP, pt on mildly thick, bite-sized diet; pt NPO throughout shift for possible intervention. Pt to transfer to [facility] neuro ICU. Report given to receiving RN. Family at bedside, updated on plan of care.     Problem: Diabetes/Glucose Control  Goal: Glucose maintained within prescribed range  Description: INTERVENTIONS:  - Monitor Blood Glucose as ordered  - Assess for signs and symptoms of hyperglycemia and hypoglycemia  - Administer ordered medications to maintain glucose within target range  - Assess barriers to adequate nutritional intake and initiate nutrition consult as needed  - Instruct patient on self management of diabetes  Outcome: Progressing     Problem: CARDIOVASCULAR - ADULT  Goal: Maintains optimal cardiac output and hemodynamic stability  Description: INTERVENTIONS:  - Monitor vital signs, rhythm, and trends  - Monitor for bleeding, hypotension and signs of decreased cardiac output  - Evaluate effectiveness of vasoactive medications to optimize hemodynamic stability  - Monitor arterial and/or venous puncture sites for bleeding and/or hematoma  - Assess quality of pulses, skin color and temperature  - Assess for signs of decreased coronary artery perfusion - ex. Angina  - Evaluate fluid balance, assess for edema, trend weights  Outcome: Progressing  Goal: Absence of cardiac arrhythmias or at baseline  Description: INTERVENTIONS:  - Continuous cardiac monitoring, monitor vital signs, obtain 12 lead EKG if indicated  - Evaluate effectiveness of antiarrhythmic and heart rate control medications as ordered  - Initiate  emergency measures for life threatening arrhythmias  - Monitor electrolytes and administer replacement therapy as ordered  Outcome: Progressing     Problem: METABOLIC/FLUID AND ELECTROLYTES - ADULT  Goal: Glucose maintained within prescribed range  Description: INTERVENTIONS:  - Monitor Blood Glucose as ordered  - Assess for signs and symptoms of hyperglycemia and hypoglycemia  - Administer ordered medications to maintain glucose within target range  - Assess barriers to adequate nutritional intake and initiate nutrition consult as needed  - Instruct patient on self management of diabetes  Outcome: Progressing     Problem: NEUROLOGICAL - ADULT  Goal: Absence of seizures  Description: INTERVENTIONS  - Monitor for seizure activity  - Administer anti-seizure medications as ordered  - Monitor neurological status  Outcome: Progressing     Problem: SAFETY ADULT - FALL  Goal: Free from fall injury  Description: INTERVENTIONS:  - Assess pt frequently for physical needs  - Identify cognitive and physical deficits and behaviors that affect risk of falls.  - [place] fall precautions as indicated by assessment.  - Educate pt/family on patient safety including physical limitations  - Instruct pt to call for assistance with activity based on assessment  - Modify environment to reduce risk of injury  - Provide assistive devices as appropriate  - Consider OT/PT consult to assist with strengthening/mobility  - Encourage toileting schedule  Outcome: Progressing     Problem: Patient Centered Care  Goal: Patient preferences are identified and integrated in the patient's plan of care  Description: Interventions:  - What would you like us to know as we care for you? Per pt , they were supposed to go to [place] today.  - Provide timely, complete, and accurate information to patient/family  - Incorporate patient and family knowledge, values, beliefs, and cultural backgrounds into the planning and delivery of care  - Encourage  patient/family to participate in care and decision-making at the level they choose  - Honor patient and family perspectives and choices  Outcome: Not Progressing     Problem: Patient/Family Goals  Goal: Patient/Family Long Term Goal  Description: Patient's Long Term Goal: To regain strength    Interventions:  - Work with physical therapy   - Perform ROM exercises   - See additional Care Plan goals for specific interventions  Outcome: Not Progressing  Goal: Patient/Family Short Term Goal  Description: Patient's Short Term Goal: To be able to eat     Interventions:   - Work with Speech therapy  - Perform swallowing and speech exercises   - See additional Care Plan goals for specific interventions  Outcome: Not Progressing     Problem: NEUROLOGICAL - ADULT  Goal: Achieves stable or improved neurological status  Description: INTERVENTIONS  - Assess for and report changes in neurological status  - Initiate measures to prevent increased intracranial pressure  - Maintain blood pressure and fluid volume within ordered parameters to optimize cerebral perfusion and minimize risk of hemorrhage  - Monitor temperature, glucose, and sodium. Initiate appropriate interventions as ordered  Outcome: Not Progressing  Goal: Achieves maximal functionality and self care  Description: INTERVENTIONS  - Monitor swallowing and airway patency with patient fatigue and changes in neurological status  - Encourage and assist patient to increase activity and self care with guidance from PT/OT  - Encourage visually impaired, hearing impaired and aphasic patients to use assistive/communication devices  Outcome: Not Progressing     Problem: Impaired Swallowing  Goal: Minimize aspiration risk  Description: Interventions:  - Patient should be alert and upright for all feedings (90 degrees preferred)  - Offer food and liquids at a slow rate  - No straws  - Encourage small bites of food and small sips of liquid  - Offer pills one at a time, crush or  deliver with applesauce as needed  - Discontinue feeding and notify MD (or speech pathologist) if coughing or persistent throat clearing or wet/gurgly vocal quality is noted  Outcome: Not Progressing     Problem: Impaired Communication  Goal: Patient will achieve maximal communication potential  Description: Interventions:    Outcome: Not Progressing

## 2024-03-20 NOTE — SLP NOTE
ADULT SWALLOWING EVALUATION    ASSESSMENT    ASSESSMENT/OVERALL IMPRESSION:  PPE REQUIRED. THIS THERAPIST WORE GLOVES, DROPLET MASK, AND GOGGLES FOR DURATION OF EVALUATION. HANDS WASHED UPON ENTRANCE/EXIT.    SLP BSSE orders received and acknowledged. A swallow evaluation warranted secondary to stroke protocol. Pt afebrile with weak vocal quality, on room air, with oxygen saturation at 98. Pt with no prior hx of dysphagia at [facility]. Pt positioned upright in bed with family at bedside. Pt with no complaints of pain, RN aware. Oral [facility] exam completed and R sided facial droop/R lingual deviation with overall weakness and reduced ROM/rate of motion observed. Pt with adequate oral acceptance and bilabial seal across trials of mildly thick liquids and puree solids. Anterior spillage from R side observed with hard solids and thin liquids.  Pt with intact bite, prolonged mastication of solids, and increased DILLON. Pt's swallow response appears delayed with reduced hyolaryngeal elevation/excursion. No clinical signs of aspiration (e.g., immediate/delayed throat clear, immediate/delayed cough, wet vocal quality, increased O2 effort) observed across all trials of puree, soft solids, and mildly thick liquids. Overt signs/symptoms of aspiration observed with thin liquids as evidenced by immediate coughing and throat clearing. Trials discontinued. Oral/buccal cavities clear of residue following all trials. Oxygen status remained >95 t/o the entire evaluation.     At this time, pt presents with mild oral dysphagia and probable pharyngeal dysfunction. Recommend a bite sized diet and mildly thick liquids with strict adherence to safe swallowing compensatory strategies. Results and recommendations reviewed with RN, pt, and family. Pt/pt's family v/u to all results/recommendations. Recommendations remain written on whiteboard.     PLAN: SLP to f/u x4 meal assessments, monitor CXR, VFSS if clinically warranted, and SLE in future session.      RECOMMENDATIONS   Diet Recommendations - Solids: Mechanical soft chopped/ Soft & Bite Sized  Diet Recommendations - Liquids: Nectar thick liquids/ Mildly thick    Compensatory Strategies Recommended: No straws;Slow rate;Alternate consistencies;Small bites and sips  Aspiration Precautions: Upright position;Slow rate;Small bites and sips;No straw  Medication Administration Recommendations: Crushed in puree  Treatment Plan/Recommendations: Aspiration precautions;Communication evaluation    HISTORY   MEDICAL HISTORY  Reason for Referral: Stroke protocol    Problem List  Principal Problem:    Intraparenchymal hematoma of brain, left, without loss of consciousness, initial encounter ([facility])  Active Problems:    Hypertensive emergency      Past Medical History  Past Medical History:   Diagnosis Date    Diverticulosis large intestine w/o perforation or abscess w/o bleeding 5/18/2018    High cholesterol     Hyperlipidemia     Internal hemorrhoids 5/18/2018       Prior Living Situation: Home with spouse  Diet Prior to Admission: Regular;Thin liquids  Precautions: Aspiration    Patient/Family Goals: Pt would like to eat    SWALLOWING HISTORY  Current Diet Consistency: NPO  Dysphagia History: None at [facility]    Imaging Results:     CXR 3/20/24:  CONCLUSION:      No radiographic evidence of acute cardiopulmonary process.      A preliminary report was issued by the [facility] Radiology teleradiology service. There are no clinically actionable discrepancies.            Dictated by (CST): Giselle Alexander MD on 3/20/2024 at 7:46 AM       Finalized by (CST): Giselle Alexander MD on 3/20/2024 at 7:46 AM       CT BRAIN 3/20/24:  CONCLUSION:   1. Large acute 4.5 x 3.5 cm intraparenchymal hematoma centered at the periphery of the left frontal lobe.  Although the hematoma largely appears similar in size and morphology since examination from approximately 6.5 hours prior, there is a small   curvilinear band of acute hemorrhagic components along  the posterior margin of the hematoma, which are new since prior.  Mild surrounding vasogenic edema appears unchanged.  Stable left cerebral hemispheric mass effect with mild 2-3 mm left-to-right   midline shift.  Continued close follow-up to resolution is advised.   2. Intracranial atherosclerosis.         Dictated by (CST): Finesse Martinez MD on 3/20/2024 at 7:54 AM       Finalized by (CST): Finesse Martinez MD on 3/20/2024 at 7:59 AM              OBJECTIVE   ORAL MOTOR EXAMINATION  Dentition: Natural;Functional  Symmetry: Reduced right facial;Lingual deviation to right  Strength:  (reduced)     Range of Motion:  (reduced)  Rate of Motion: Reduced    Voice Quality: Weak  Respiratory Status: Unlabored  Consistencies Trialed: Thin liquids;Nectar thick liquids;Puree;Soft solid;Hard solid  Method of Presentation: Self presentation;Staff/Clinician assistance;Spoon;Cup;Single sips  Patient Positioning: Upright;Midline    Oral Phase of Swallow: Impaired  Bolus Retrieval: Intact  Bilabial Seal: Impaired  Bolus Formation: Impaired  Bolus Propulsion: Impaired  Mastication: Impaired  Retention: Impaired    Pharyngeal Phase of Swallow: Impaired  Laryngeal Elevation Timing: Appears impaired  Laryngeal Elevation Strength: Appears impaired     (Please note: Silent aspiration cannot be evaluated clinically. Videofluoroscopic Swallow Study is required to rule-out silent aspiration.)    Esophageal Phase of Swallow: No complaints consistent with possible esophageal involvement    GOALS  Goal #1 The patient will tolerate bite sized consistency and mildly thick liquids without overt signs or symptoms of aspiration with 100 % accuracy over 2 session(s).  In Progress   Goal #2 The patient/family/caregiver will demonstrate understanding and implementation of aspiration precautions and swallow strategies independently over 4 session(s).    In Progress   Goal #3 The patient will tolerate trial upgrade of easy to chew/regular consistency and  thin liquids without overt signs or symptoms of aspiration with 100 % accuracy over 3-4 session(s).  In Progress   Goal #4 The patient will utilize compensatory strategies as outlined by  BSSE (clinical evaluation) including Slow rate, Small bites, Small sips, Alternate liquids/solids, No straws, Upright 90 degrees, Feed patient with moderate assistance 100 % of the time across 2 sessions.    In Progress   Goal #5 SLE in future session.     In Progress       FOLLOW UP  Treatment Plan/Recommendations: Aspiration precautions;Communication evaluation  Number of Visits to Meet Established Goals: 4  Follow Up Needed (Documentation Required): Yes  SLP Follow-up Date: 03/21/24    Thank you for your referral.   If you have any questions, please contact TERE Bass M.S. CCC-SLP  Speech Language Pathologist  Phone Number [phone]

## 2024-03-20 NOTE — CONSULTS
[facility] Neurosurgery Consult    Leanne Ching Patient Status:  Inpatient    1960 MRN [id number]   Location [facility] 2W/SW Attending Yogesh Berry MD   Hosp Day # 0 PCP Shelby Frost MD     REASON FOR CONSULTATION: Left H    HISTORY OF PRESENT ILLNESS:  Leanne Ching is a(n) 63 year old female with a pertinent past medical history of hyperlipidemia.  No hypertension endorsed.  No history of cardiac issues, MI or stroke.  No anticoagulation/antiplatelet use.    History primarily provided by the  secondary to the patient's expressive aphasia.   states they recently got back from [facility].  The patient has had time off recently to care for her grandchildren.  They were planning to travel to [place] early this morning.   states the patient drove out to the city to their condo to pick things up.  She came back to [place].  She was not feeling very well at dinnertime.  She had a full meal.   went up to bed.  He states at 1030 approximately she came up and stated she could not speak.  They came to the ED.    Wife answers questions with a nod for yes and no primarily.  She does attempt to talk.  Secondary expressive aphasia  continues to provide history. Patient currently endorses no headaches. Continued expressive aphasia, which  endorses has improved. Continue right sided weakness, which the  states she's moving her LE more so than she was yesterday evening.       PAST MEDICAL HISTORY:  Past Medical History:   Diagnosis Date    Diverticulosis large intestine w/o perforation or abscess w/o bleeding 2018    High cholesterol     Hyperlipidemia     Internal hemorrhoids 2018       PAST SURGICAL HISTORY:  Past Surgical History:   Procedure Laterality Date    ABLATION      COLONOSCOPY N/A 2018    Procedure: COLONOSCOPY;  Surgeon: Jacinda Cruz MD;  Location: [facility] ENDOSCOPY          x 2       FAMILY HISTORY:  family history  includes Cancer (age of onset: 70) in her paternal grandmother.    SOCIAL HISTORY:   reports that she has never smoked. She has never used smokeless tobacco. She reports that she does not drink alcohol and does not use drugs.    ALLERGIES:  No Known Allergies    MEDICATIONS:  Medications Prior to Admission   Medication Sig Dispense Refill Last Dose    simvastatin 10 MG Oral Tab Take 1 tablet (10 mg total) by mouth nightly.   3/19/2024    PEG 3350-KCl-NaBcb-NaCl-NaSulf (COLYTE WITH FLAVOR PACKS) 240 g Oral Recon Soln As directed per GI consult notes 1 Bottle 0      Current Facility-Administered Medications   Medication Dose Route Frequency    niCARdipine in sodium chloride 0.86% (carDENE) 20 mg/200mL infusion premix  5-15 mg/hr Intravenous Continuous    sodium chloride 0.9% infusion   Intravenous Continuous    acetaminophen (Tylenol) tab 650 mg  650 mg Oral Q4H PRN    Or    acetaminophen (Tylenol) rectal suppository 650 mg  650 mg Rectal Q4H PRN    labetalol (Trandate) 5 mg/mL injection 10 mg  10 mg Intravenous Q10 Min PRN    hydrALAzine (Apresoline) 20 mg/mL injection 10 mg  10 mg Intravenous Q2H PRN    ondansetron (Zofran) 4 MG/2ML injection 4 mg  4 mg Intravenous Q6H PRN    Or    metoclopramide (Reglan) 5 mg/mL injection 10 mg  10 mg Intravenous Q8H PRN    polyethylene glycol (PEG 3350) (Miralax) 17 g oral packet 17 g  17 g Oral Daily PRN    sennosides (Senokot) tab 17.2 mg  17.2 mg Oral Nightly PRN    bisacodyl (Dulcolax) 10 MG rectal suppository 10 mg  10 mg Rectal Daily PRN    fleet enema (Fleet) 7-19 GM/118ML rectal enema 133 mL  1 enema Rectal Once PRN    midazolam (Versed) 2 MG/2ML injection 2 mg  2 mg Intravenous Q15 Min PRN    pantoprazole (Protonix) 40 mg in sodium chloride 0.9% PF 10 mL IV push  40 mg Intravenous Daily    amLODIPine (Norvasc) tab 5 mg  5 mg Oral Daily    pravastatin (Pravachol) tab 20 mg  20 mg Oral Nightly       REVIEW OF SYSTEMS:  Comprehensive Review of Systems obtained, and is  negative other than that mentioned in the History of Present Illness.      PHYSICAL EXAMINATION:  VITAL SIGNS: /89 (BP Location: Left arm)   Pulse 68   Temp 98.2 °F (36.8 °C) (Temporal)   Resp 15   Ht 67\"   Wt 126 lb 8.7 oz (57.4 kg)   SpO2 97%   BMI 19.82 kg/m²   GENERAL:  Pleasant patient is a 63 year old female in no acute distress. Laying comfortably in bed.   HEENT:  Normocephalic, atraumatic  RESP: Easy and even   NEUROLOGICAL:  This patient is awake and alert. Expressive aphasia. She is able to state her name.  She appears to understand commands, although intermittent difficulty noted.  Right facial droop with right tongue deviation.  EOMs intact.  Pupils equal round and reactive to light.  Able to shrug shoulders.  Unable to assess drift right secondary to weakness.  Negative drift left.  Mild dysmetria left.    Upper extremity strength:    Deltoid  Biceps  Triceps     Intrinsics      Right 0 0 0 0 1     Left 5 5 5 5 5     Lower extremity strength:     Iliopsoas    D-flexion P-flexion   Right       3         4 3   Left       5         5 5   Patient is able to passively elevate her RLE off the bed and hold for 3 seconds     No clonus right, unable to assess left 2/2 to continued tensing and movement. Multiple attempts performed    Decreased sensation of RUE and RLE      DIAGNOSTIC DATA:   Lab Results   Component Value Date    WBC 9.2 03/19/2024    HGB 13.0 03/19/2024    HCT 41.3 03/19/2024    .0 03/19/2024    CREATSERUM 0.82 03/19/2024    BUN 14 03/19/2024     03/19/2024    K 4.0 03/19/2024     03/19/2024    CO2 26.0 03/19/2024     03/19/2024    CA 10.2 03/19/2024    ALB 4.9 03/19/2024    ALKPHO 66 03/19/2024    BILT 0.5 03/19/2024    TP 7.9 03/19/2024    AST 25 03/19/2024    ALT 15 03/19/2024    PTT 26.5 03/19/2024    INR 0.89 03/19/2024    PTP 12.5 03/19/2024    PGLU 128 03/20/2024       IMAGING:    Study Result    Narrative   PROCEDURE: CT BRAIN OR HEAD  (CPT=70450)     COMPARISON: [facility], CT STROKE CTA BRAIN/CTA NECK (WIV) (CPT=70496/21899), 3/19/2024, 11:54 PM.  [facility], CT STROKE BRAIN (NO IV) (CPT=70450), 3/19/2024, 11:54 PM.     INDICATIONS: facial droop/RT sided weakness, subdural hematoma     TECHNIQUE: CT images were obtained without contrast material.  Automated exposure control for dose reduction was used.  Dose information is transmitted to the ACR (American College of Radiology) NRDR (National Radiology Data Registry) which includes the  Dose Index Registry.     FINDINGS:  Large acute 4.5 x 3.5 cm intraparenchymal hematoma, which is centered at the periphery of the left frontal lobe.  Although the majority of the hemorrhage appears similar in comparison to prior, there is a small curvilinear band of new or more conspicuous   acute hyperdense hemorrhagic components along the posterior margin of the hemorrhage.  Stable mild surrounding vasogenic edema.  There is right cerebral hemispheric mass effect with 2-3 mm right to left midline shift, unchanged.  No hydrocephalus.  No  inferior cerebellar tonsillar herniation. Atherosclerotic vascular calcifications are perceived in the carotid siphons and distal vertebral arteries.  Variant incomplete fusion of the posterior C1 arch.  Imaged paranasal sinuses and mastoid cavities are  well aerated.               Impression   CONCLUSION:  1. Large acute 4.5 x 3.5 cm intraparenchymal hematoma centered at the periphery of the left frontal lobe.  Although the hematoma largely appears similar in size and morphology since examination from approximately 6.5 hours prior, there is a small  curvilinear band of acute hemorrhagic components along the posterior margin of the hematoma, which are new since prior.  Mild surrounding vasogenic edema appears unchanged.  Stable left cerebral hemispheric mass effect with mild 2-3 mm left-to-right  midline shift.  Continued close follow-up to  resolution is advised.  2. Intracranial atherosclerosis.        Dictated by (CST): Finesse Martinez MD on 3/20/2024 at 7:54 AM      Finalized by (CST): Finesse Martinez MD on 3/20/2024 at 7:59 AM         Study Result    Narrative   PROCEDURE: CT STROKE BRAIN (NO IV) (CPT=70450)     COMPARISON: None.     INDICATIONS: Acute onset RT facial droop, RT arm weakness with aphasia.     TECHNIQUE:   CT images were obtained without contrast material.  Automated exposure control for dose reduction was used.  Dose information is transmitted to the ACR (American College of Radiology) NRDR (National Radiology Data Registry) which includes  the Dose Index Registry.     FINDINGS:  CSF SPACES:  Ventricles and sulci are nonenlarged for patient age.     CEREBRUM:  There is a 3.6 x 3.6 x 4.8 centimeter left frontal intraparenchymal hematoma, with moderate local mass effect, including effacement of adjacent sulci.  There is a small amount of surrounding white matter hypoattenuation, most compatible with  vasogenic edema.  There is 3 millimeters left-to-right midline shift.  There is no evidence of herniation otherwise.     CEREBELLUM: There is no mass effect or focal hemorrhage identified.     BRAINSTEM: No hemorrhage or mass effect.     CALVARIUM: Intact.     SINUSES: Limited views demonstrate no significant mucosal thickening or fluid     MASTOIDS:  Unremarkable.     ORBITS:  Unremarkable.     OTHER:  Unremarkable                     Impression   CONCLUSION:     Large 4.8 centimeter left frontal intraparenchymal hematoma with moderate local mass effect, as well as 3 millimeters left-to-right midline shift.  Neurosurgical consultation is recommended.  Follow-up CT brain is recommended.  Differential  considerations include underlying vascular etiologies such as AVM, a hemorrhagic mass, or other etiologies.       This report was called immediately at [facility] time on 03/20/2024 hours to Dr. Angeles by Dr Bryant GANDHI  preliminary report was issued by the [employer] [facility] teleradiology service. There are no clinically actionable discrepancies.     Dictated by (CST): Giselle Alexander MD on 3/20/2024 at 7:16 AM      Finalized by (CST): Giselle Alexander MD on 3/20/2024 at 7:23 AM       Study Result    Narrative   PROCEDURE: CT STROKE CTA BRAIN/CTA NECK (WIV) (CPT=70496/93559)     COMPARISON: None.     INDICATIONS: Right sided facial droop, right arm flacid.     TECHNIQUE: CT images of the neck and brain were obtained after the administration of non-ionic intravenous contrast material. Multi-planar reformatted/3-D images were created to optimize visualization of vascular anatomy.  Automated exposure control for  dose reduction was used. Evaluation of internal carotid stenosis is based on NASCET Criteria.       FINDINGS:        AORTIC ARCH (Limited): Visualized portions are unremarkable.  There is tortuosity of the great vessels, as can be seen in the setting of chronic systemic hypertension.        CAROTID ARTERIES:  RIGHT COMMON CAROTID: There are multifocal areas of mild atherosclerotic narrowing.  No high-grade, flow limiting stenosis or dissection.  RIGHT INTERNAL CAROTID: There are multifocal areas of mild atherosclerotic narrowing.  No high-grade, flow limiting stenosis or dissection.        LEFT COMMON CAROTID: There are multifocal areas of mild atherosclerotic narrowing.  No high-grade, flow limiting stenosis or dissection.  There is 36 percent narrowing by atherosclerosis, by NASCET criteria  LEFT INTERNAL CAROTID: There are multifocal areas of mild atherosclerotic narrowing.  No high-grade, flow limiting stenosis or dissection.     VERTEBRAL ARTERIES:  RIGHT: There are multifocal areas of mild atherosclerotic narrowing.  No high-grade, flow limiting stenosis or dissection.  LEFT: There are multifocal areas of mild atherosclerotic narrowing.  No high-grade, flow limiting stenosis or dissection.     BASILAR ARTERY: There are  multifocal areas of mild atherosclerotic narrowing.  No high-grade, flow limiting stenosis or dissection.        INTRACRANIAL ARTERIES:  There is a abnormal tangle of serpiginous enhancement centered within a left frontal intraparenchymal hematoma measuring 0.5 x 0.7 by 0.4 cm (series 14, image 86 and series 13, image 260).  There is again local mass effect related to the large left frontal intraparenchymal hematoma, with associated 5 millimeters left-to-right midline shift.     ANTERIOR CEREBRALS:  There are multifocal areas of mild atherosclerotic narrowing.  No high-grade, flow limiting stenosis.  MIDDLE CEREBRALS: There are multifocal areas of mild atherosclerotic narrowing.  No high-grade, flow limiting stenosis.  POSTERIOR CEREBRALS: There are multifocal areas of mild atherosclerotic narrowing.  No high-grade flow limiting stenosis.        OTHER FINDINGS:  Assessment of the lung apices is limited by motion artifact.     There is multilevel degenerative disease of the spine.               Impression   CONCLUSION:     Large left frontal intraparenchymal hematoma again noted.  There is associated internal serpiginous enhancement, which may represent an abnormal tangle of vessels.  Finding raises the possibility of an underlying vascular malformation.  Neuro surgical  consultation has already been placed at this time.  Further assessment with diagnostic interventional angiography and/or contrast enhanced MRI brain would be helpful.     Local mass effect from left frontal intraparenchymal hematoma as well as left to right 5 millimeter midline shift again noted.     No flow-limiting stenosis within the major arteries of the neck.  Approximately 36% narrowing of the left carotid bulb by atherosclerotic plaque.  No significant narrowing of the right carotid bulb.     No high-grade flow-limiting stenosis within the major arteries of the [id number] intracranially.              Dictated by (CST): Giselle Alexander,  MD on 3/20/2024 at 8:05 AM      Finalized by (CST): Giselle Alexander MD on 3/20/2024 at 8:22 AM       ASSESSMENT:  Patient Active Problem List   Diagnosis    Diverticulosis large intestine w/o perforation or abscess w/o bleeding    Internal hemorrhoids    Intraparenchymal hematoma of brain, left, without loss of consciousness, initial encounter (HCC)    Hypertensive emergency     64 y/o female without a pertinent hx of HTN who presented to the ED overnight with expressive aphasia at right sided weakness (UE>LE), CT with left large acute IPH with mass affect and midline shift    Plan:    1.  Hold anticoagulation/antiplatelet therapy  2.  Systolic blood pressure less than 140  3.  Medical management per critical care and hospitalist  4.  Repeat head CT in the morning.  If a change in neurologic status, complete stat head CT and notify neurosurgery.  5.  Dr. Wade to discuss further with Dr. Romero if angiogram is recommended to rule out underlying lesion  6.  Discussed with Dr. Wade    The patient was seen and examined.  Patient and  agree to the plan, verbalized understanding were very appreciative.  Discussed with nursing.    Camille Seymour M.S., PA-C  [facility]  [address], [address]  [place] [address]  [phone]  3/20/2024 8:51 AM    Dragon speech recognition software was used to prepare this note. If a word or phrase is confusing, it is likely due to a failure of recognition. Please contact me with any questions or clarifications.    Is this a shared or split note between Advanced Practice Provider and Physician? Yes

## 2024-03-20 NOTE — DISCHARGE SUMMARY
[facility]  part of [facility]    Discharge Summary    Leanne Ching Patient Status:  Inpatient    1960 MRN [id number]   Location [facility] 2W/SW Attending Yogesh Berry MD   Hosp Day # 0 PCP Shelby Frost MD     Date of Admission: 3/19/2024 Disposition: [facility]     Date of Discharge: 24      Admitting Diagnosis: Hypertensive emergency [I16.1]  Intraparenchymal hematoma of brain, left, without loss of consciousness, initial encounter ([facility]) [S06.320A]    Hospital Discharge Diagnoses:  Lf. frontal Intraparenchymal hematoma with surrounding vasogenic edema    Lace+ Score: 40  59-90 High Risk  29-58 Medium Risk  0-28   Low Risk.    TCM Follow-Up Recommendation:  LACE 29-58: Moderate Risk of readmission after discharge from the hospital.      Problem List:   Patient Active Problem List   Diagnosis    Diverticulosis large intestine w/o perforation or abscess w/o bleeding    Internal hemorrhoids    Intraparenchymal hematoma of brain, left, without loss of consciousness, initial encounter ([facility])    Hypertensive emergency       Reason for Admission:   Lf. frontal Intraparenchymal hematoma with surrounding vasogenic edema  Accelerated Hypertension      Physical Exam:   General appearance: alert, appears stated age and cooperative  Pulmonary:  clear to auscultation bilaterally  Cardiovascular: S1, S2 normal, no murmur, click, rub or gallop, regular rate and rhythm  Abdominal: soft, non-tender; bowel sounds normal; no masses,  no organomegaly  Extremities: extremities normal, atraumatic, no cyanosis or edema  Psychiatric: calm  Neuro- + rt. Sided weakness, rt. Facial droop, expressive aphasia.       History of Present Illness:   Per Dr. Moreno  Leanne Ching is a(n) 63 year old female, who presents for evaluation of sudden onset slurred speech and right arm weakness. PMHx significant for hyperlipidemia.  Patient accompanied by  at bedside.  History obtained by   at bedside as well as EMR.  Around 10:30 PM patient started having symptoms of difficulty speaking as well as sudden onset of right arm weakness.  She was noted to have slurred speech and did not feel right.  No history of elevated blood pressure.  No previous episodes of similar symptoms.  Per , patient is otherwise healthy.  She denies being on any antiplatelet or anticoagulation medications.  On my evaluation, patient with noticeable slurred speech and unable to move her right arm.  She is able to move all other extremities.  Alert and oriented to self, time, location.  On presentation to the ED, stroke alert was called.  CT head revealed 3.7 x 3.7 x 5.6 cm left frontal intraparenchymal hematoma with surrounding vasogenic edema resulting in mild mass effect with approximately 0.3 cm rightward midline shift.  TNK was not given.  Patient was also noted to have elevated blood pressure of /106.  Remaining vital signs within normal limits.  Patient was treated with labetalol initially 10 mg IV x 1 followed by 20 mg with improvement of blood pressure.  She was started on nicardipine drip.  She was admitted to intensive care unit for closer observation with consultation to neurology/neuro surgery/critical care.     Hospital Course:   Brief Hospital course, Ms. Ching was admitted for right arm weakness, slurred speech found to have a left frontal intraparenchymal hematoma with surrounding vasogenic edema.  She was admitted to the ICU she had significantly elevated blood pressure requiring nicardipine drip.  Was seen by neurosurgery, neurology, pulmonary critical care.  CT scan and CTA reviewed discussed with Dr. Wade from neurosurgery.  There is left frontal intraparenchymal hemorrhage with extension to or just beneath the surface, there is a small spot sign within this hemorrhage on the CT angiogram.  There is no clear evidence of a vascular malformation.  Dr. Wade recommends [facility] for a diagnostic angiogram and decision to be made for benefit of surgical evacuation in this case pending angiogram.    Family agreeable to transfer for diagnostic angiogram and neuro intensive care at [facility].    Called hospitalist to Mike with signout.    Consultations:   Neurology  Neurosurgery  Pulmonary critical care    Procedures: n/a    Complications: n/a    Discharge Condition: Fair    Discharge Medications:      Discharge Medications        START taking these medications        Instructions Prescription details   acetaminophen 325 MG Tabs  Commonly known as: Tylenol      Take 2 tablets (650 mg total) by mouth every 4 (four) hours as needed.   Refills: 0     amLODIPine 5 MG Tabs  Commonly known as: Norvasc      Take 1 tablet (5 mg total) by mouth daily.   Refills: 0     bisacodyl 10 MG Supp  Commonly known as: Dulcolax      Place 1 suppository (10 mg total) rectally daily as needed.   Refills: 0     niCARdipine in sodium chloride 0.86% 20 mg/200mL Soln  Commonly known as: carDENE      Inject 5-15 mg/hr into the vein continuous.   Refills: 0     ondansetron 4 MG/2ML Soln  Commonly known as: Zofran      Inject 2 mL (4 mg total) into the vein every 6 (six) hours as needed.   Refills: 0     Polyethylene Glycol 3350 17 g Pack  Commonly known as: MIRALAX      Take 17 g by mouth daily as needed (If no bowel movement in last 24 hours).   Refills: 0     Sennosides 17.2 MG Tabs      Take 1 tablet (17.2 mg total) by mouth nightly as needed (constipation, as needed if no bowel movement that day).   Refills: 0     sodium chloride 0.9%      Inject into the vein continuous.   Refills: 0     sodium chloride 0.9% PF 0.9% SOLN 10 mL with pantoprazole 40 MG SOLR 40 mg  Start taking on: March 21, 2024      Inject 40 mg into the vein daily.   Refills: 0            CONTINUE taking these medications        Instructions Prescription details   simvastatin 10 MG Tabs  Commonly known as: Zocor      Take 1 tablet  (10 mg total) by mouth nightly.   Refills: 0            STOP taking these medications      polyethylene glycol (PEG 3350-KCl-NaBcb-NaCl-NaSulf) 240 g Solr  Commonly known as: Colyte with Flavor Packs                     Other Discharge Instructions: per Neuro intensive care    GAGE DEAL MD  3/20/2024  3:52 PM    > 35 min

## 2024-03-20 NOTE — CONSULTS
[facility]  part of [facility]    Report of Consultation    Leanne Ching Patient Status:  Inpatient    1960 MRN [id number]   Location [facility] 2W/SW Attending Yogesh Berry MD   Hosp Day # 0 PCP Shelby Frost MD     Date of Admission:  3/19/2024    Reason for Consultation:   Intracranial hemorrhage    History of Present Illness:   Patient is a 63-year-old female with underlying history of hyperlipidemia who presents with chief complaint of expressive aphasia and some associated right-sided weakness noticed earlier this morning.   states that they were planning on traveling to [place] today and patient found to have expressive aphasia prompting him to come to the hospital.  Right upper and lower extremity weakness also noted.  Denies any fall.  Denies prior history of CVA or intracranial bleeding.  Alert, awake otherwise.  Denies history of known hypertension or use of anticoagulation therapy.    Past Medical History  Past Medical History:   Diagnosis Date    Diverticulosis large intestine w/o perforation or abscess w/o bleeding 2018    High cholesterol     Hyperlipidemia     Internal hemorrhoids 2018       Past Surgical History  Past Surgical History:   Procedure Laterality Date    ABLATION      COLONOSCOPY N/A 2018    Procedure: COLONOSCOPY;  Surgeon: Jacinda Cruz MD;  Location: [facility] ENDOSCOPY          x 2       Family History  Family History   Problem Relation Age of Onset    Cancer Paternal Grandmother 70        UTERINE       Social History  Social History     Socioeconomic History    Marital status:    Tobacco Use    Smoking status: Never    Smokeless tobacco: Never   Substance and Sexual Activity    Alcohol use: No    Drug use: No           Current Medications:  Current Facility-Administered Medications   Medication Dose Route Frequency    niCARdipine in sodium chloride 0.86% (carDENE) 20 mg/200mL infusion premix   5-15 mg/hr Intravenous Continuous    sodium chloride 0.9% infusion   Intravenous Continuous    acetaminophen (Tylenol) tab 650 mg  650 mg Oral Q4H PRN    Or    acetaminophen (Tylenol) rectal suppository 650 mg  650 mg Rectal Q4H PRN    labetalol (Trandate) 5 mg/mL injection 10 mg  10 mg Intravenous Q10 Min PRN    hydrALAzine (Apresoline) 20 mg/mL injection 10 mg  10 mg Intravenous Q2H PRN    ondansetron (Zofran) 4 MG/2ML injection 4 mg  4 mg Intravenous Q6H PRN    Or    metoclopramide (Reglan) 5 mg/mL injection 10 mg  10 mg Intravenous Q8H PRN    polyethylene glycol (PEG 3350) (Miralax) 17 g oral packet 17 g  17 g Oral Daily PRN    sennosides (Senokot) tab 17.2 mg  17.2 mg Oral Nightly PRN    bisacodyl (Dulcolax) 10 MG rectal suppository 10 mg  10 mg Rectal Daily PRN    fleet enema (Fleet) 7-19 GM/118ML rectal enema 133 mL  1 enema Rectal Once PRN    midazolam (Versed) 2 MG/2ML injection 2 mg  2 mg Intravenous Q15 Min PRN    pantoprazole (Protonix) 40 mg in sodium chloride 0.9% PF 10 mL IV push  40 mg Intravenous Daily    amLODIPine (Norvasc) tab 5 mg  5 mg Oral Daily    pravastatin (Pravachol) tab 20 mg  20 mg Oral Nightly     Medications Prior to Admission   Medication Sig    simvastatin 10 MG Oral Tab Take 1 tablet (10 mg total) by mouth nightly.    PEG 3350-KCl-NaBcb-NaCl-NaSulf (COLYTE WITH FLAVOR PACKS) 240 g Oral Recon Soln As directed per GI consult notes       Allergies  No Known Allergies    Review of Systems:   Constitutional: denies fevers, chills, weakness, fatigue, recent illness  HEENT: denies headache, sore throat, vision loss  Cardio: denies chest pain, chest pressure, palpitations  Respiratory: denies dyspnea, cough, wheezing, hemoptysis   GI: denies nausea, vomiting, abdominal pain  : denies dysuria, hematuria  Musculoskeletal: denies arthralgia, myalgia  Integumentary: denies rash, itching  Neurological: Expressive aphasia, right-sided motor deficit  Psychiatric: denies depression,  anxiety  Hematologic: denies bruising        Physical Exam:   Blood pressure 136/77, pulse 103, temperature 98.2 °F (36.8 °C), temperature source Temporal, resp. rate 16, height 5' 7\" (1.702 m), weight 126 lb 8.7 oz (57.4 kg), SpO2 98%.    Constitutional: no acute distress  Eyes: PERRL  ENT: nares patent  Neck: neck supple, no JVD  Cardio: RRR, S1 S2  Respiratory: clear to auscultation bilaterally, no wheezing, rales, rhonchi, crackles  GI: abdomen soft, non tender, active bowel souds, no organomegaly  Extremities: no clubbing, cyanosis, edema  Neurologic: Aggressive aphasia with right upper and lower extremity weakness noted  Skin: warm, dry    Results:   Laboratory Data  Lab Results   Component Value Date    WBC 9.2 03/19/2024    HGB 13.0 03/19/2024    HCT 41.3 03/19/2024    .0 03/19/2024    CREATSERUM 0.82 03/19/2024    BUN 14 03/19/2024     03/19/2024    K 4.0 03/19/2024     03/19/2024    CO2 26.0 03/19/2024     (H) 03/19/2024    CA 10.2 03/19/2024    ALB 4.9 (H) 03/19/2024    ALKPHO 66 03/19/2024    TP 7.9 03/19/2024    AST 25 03/19/2024    ALT 15 03/19/2024    PTT 26.5 03/19/2024    INR 0.89 03/19/2024    PTP 12.5 03/19/2024         Imaging  CT STROKE CTA BRAIN/CTA NECK (W IV)(CPT=70496/51782)    Result Date: 3/20/2024  CONCLUSION:   Large left frontal intraparenchymal hematoma again noted.  There is associated internal serpiginous enhancement, which may represent an abnormal tangle of vessels.  Finding raises the possibility of an underlying vascular malformation.  Neuro surgical consultation has already been placed at this time.  Further assessment with diagnostic interventional angiography and/or contrast enhanced MRI brain would be helpful.  Local mass effect from left frontal intraparenchymal hematoma as well as left to right 5 millimeter midline shift again noted.  No flow-limiting stenosis within the major arteries of the neck.  Approximately 36% narrowing of the left carotid  bulb by atherosclerotic plaque.  No significant narrowing of the right carotid bulb.  No high-grade flow-limiting stenosis within the major arteries of the lumlpm-mc-Rxndmf intracranially.     Dictated by (CST): Giselle Alexander MD on 3/20/2024 at 8:05 AM     Finalized by (CST): Giselle Alexander MD on 3/20/2024 at 8:22 AM          CT BRAIN OR HEAD (70849)    Result Date: 3/20/2024  CONCLUSION:  1. Large acute 4.5 x 3.5 cm intraparenchymal hematoma centered at the periphery of the left frontal lobe.  Although the hematoma largely appears similar in size and morphology since examination from approximately 6.5 hours prior, there is a small curvilinear band of acute hemorrhagic components along the posterior margin of the hematoma, which are new since prior.  Mild surrounding vasogenic edema appears unchanged.  Stable left cerebral hemispheric mass effect with mild 2-3 mm left-to-right midline shift.  Continued close follow-up to resolution is advised. 2. Intracranial atherosclerosis.   Dictated by (CST): Finesse Martinez MD on 3/20/2024 at 7:54 AM     Finalized by (CST): Finesse Martinez MD on 3/20/2024 at 7:59 AM          XR CHEST AP PORTABLE  (CPT=71045)    Result Date: 3/20/2024  CONCLUSION:   No radiographic evidence of acute cardiopulmonary process.  A preliminary report was issued by the [facility] teleradiology service. There are no clinically actionable discrepancies.    Dictated by (CST): Giselle Alexander MD on 3/20/2024 at 7:46 AM     Finalized by (CST): Giselle Alexander MD on 3/20/2024 at 7:46 AM          CT STROKE BRAIN (NO IV)(CPT=70450)    Result Date: 3/20/2024  CONCLUSION:   Large 4.8 centimeter left frontal intraparenchymal hematoma with moderate local mass effect, as well as 3 millimeters left-to-right midline shift.  Neurosurgical consultation is recommended.  Follow-up CT brain is recommended.  Differential considerations include underlying vascular etiologies such as AVM, a hemorrhagic mass, or  other etiologies.   This report was called immediately at 105 Eastern time on 03/20/2024 hours to Dr. Angeles by Dr Bryant Reynoso   A preliminary report was issued by the [facility] teleradiology service. There are no clinically actionable discrepancies.  Dictated by (CST): Giselle Alexander MD on 3/20/2024 at 7:16 AM     Finalized by (CST): Giselle Alexander MD on 3/20/2024 at 7:23 AM           Assessment   1.  Left intracranial hemorrhage  2.  Expressive aphasia  3.  Hypertension  4.  Hyperlipidemia    Plan   -Patient presents with evidence of expressive aphasia and right-sided motor deficit noted.  Found to have evidence of left frontal intraparenchymal hemorrhage noted on CT head on presentation.  -Repeat CT head on 3/20/2024 with 4.5 cm intraparenchymal hematoma within the frontal lobe.  Mild surrounding vasogenic edema seen with 2 to 3 mm mass effect seen with midline shift.  -CTA brain raising suspicion for possible vascular malformation.  Await further neurosurgery recommendations to see if angiogram required to assess for potential brain lesion.  -Close neuromonitoring  -Patient denies history of hypertension.  As needed antihypertensives ordered  -Further recommendations per neurosurgery  -Hold all anticoagulation at this time  -Discussed with family at bedside  -Reviewed vitals, labs and imaging    Misael Candelario DO  Pulmonary Critical Care Medicine  [facility]  3/20/2024  12:06 PM

## 2024-03-20 NOTE — CM/SW NOTE
03/20/24 1400   CM/SW Referral Data   Referral Source Physician;Social Work (self-referral)   Reason for Referral Discharge planning   Informant EMR   Medical Hx   Does patient have an established PCP? Yes   Significant Past Medical/Mental Health Hx HLD   Patient Info   Patient's Home Environment House   Number of Levels in Home 2   Number of Stair in Home 8  (5 outside)   Patient lives with Spouse/Significant other   Patient Status Prior to Admission   Independent with ADLs and Mobility Yes   Discharge Needs   Anticipated D/C needs To be determined     Social work conducted a through chart review.    The patient lives with her spouse in a 2 level home.  The patient has 2 children and 2 grandchildren that live out of state.  The patient is independent with all ADLs at baseline and did not use any assistive devices.   The patient and her spouse had been traveling and were supposed to be in [place] today.    Social work will follow up after PT/OT evaluations are completed.     SW/CM to remain available for support and/or discharge planning.     Zahraa Jose MSW, LSW  Discharge Planner [id number]

## 2024-03-20 NOTE — H&P
[facility]  part of [facility]    History & Physical    Leanne Ching Patient Status:  Emergency    1960 MRN [id number]   Location [facility] EMERGENCY DEPARTMENT Attending Riley Angeles MD   Hosp Day # 0 PCP Cristiano Vega MD     Date:  3/20/2024  Date of Admission:  3/19/2024    Chief Complaint:  No chief complaint on file.      History of Present Illness:  Leanne Ching is a(n) 63 year old female, who presents for evaluation of sudden onset slurred speech and right arm weakness. PMHx significant for hyperlipidemia.  Patient accompanied by  at bedside.  History obtained by  at bedside as well as EMR.  Around 10:30 PM patient started having symptoms of difficulty speaking as well as sudden onset of right arm weakness.  She was noted to have slurred speech and did not feel right.  No history of elevated blood pressure.  No previous episodes of similar symptoms.  Per , patient is otherwise healthy.  She denies being on any antiplatelet or anticoagulation medications.  On my evaluation, patient with noticeable slurred speech and unable to move her right arm.  She is able to move all other extremities.  Alert and oriented to self, time, location.  On presentation to the ED, stroke alert was called.  CT head revealed 3.7 x 3.7 x 5.6 cm left frontal intraparenchymal hematoma with surrounding vasogenic edema resulting in mild mass effect with approximately 0.3 cm rightward midline shift.  TNK was not given.  Patient was also noted to have elevated blood pressure of /106.  Remaining vital signs within normal limits.  Patient was treated with labetalol initially 10 mg IV x 1 followed by 20 mg with improvement of blood pressure.  She was started on nicardipine drip.  She was admitted to intensive care unit for closer observation with consultation to neurology/neuro surgery/critical care.    History:  Past Medical History:   Diagnosis Date    Diverticulosis  large intestine w/o perforation or abscess w/o bleeding 2018    High cholesterol     Hyperlipidemia     Internal hemorrhoids 2018     Past Surgical History:   Procedure Laterality Date    ABLATION      COLONOSCOPY N/A 2018    Procedure: COLONOSCOPY;  Surgeon: Jacinda Cruz MD;  Location: [facility] ENDOSCOPY          x 2     Family History   Problem Relation Age of Onset    Cancer Paternal Grandmother 70        UTERINE      reports that she has never smoked. She has never used smokeless tobacco. She reports that she does not drink alcohol and does not use drugs.    Allergies:  No Known Allergies    Home Medications:  Prior to Admission Medications   Prescriptions Last Dose Informant Patient Reported? Taking?   DHA-EPA-Vitamin E (OMEGA-3 COMPLEX OR)   Yes No   Sig: Take by mouth.   Multiple Vitamins-Minerals (MULTI-VITAMIN/MINERALS) Oral Tab   Yes No   Sig: Take 1 tablet by mouth daily.   PATIENT SUPPLIED MEDICATION   Yes No   PEG 3350-KCl-NaBcb-NaCl-NaSulf (COLYTE WITH FLAVOR PACKS) 240 g Oral Recon Soln   No No   Sig: As directed per GI consult notes   Rosuvastatin Calcium 10 MG Oral Tab   Yes No   Sig: Take 10 mg by mouth nightly.   simvastatin 10 MG Oral Tab   Yes No   Sig: Take 10 mg by mouth nightly.      Facility-Administered Medications: None       Review of Systems:  Constitutional: Negative  Eye:  Negative.  Ear/Nose/Mouth/Throat:  Negative.  Respiratory:  Negative  Cardiovascular: Negative  Gastrointestinal:  Negative.  Genitourinary:  Negative  Endocrine:  Negative.  Immunologic:  Negative.  Musculoskeletal:  Negative.  Integumentary:  Negative.  Neurologic:  + Slurred speech/right arm weakness  Psychiatric:  Negative.  ROS reviewed as documented in chart    Physical Exam:  Pulse:  [] 93  Resp:  [15-22] 15  BP: (138-192)/() 154/95  SpO2:  [96 %-99 %] 98 %    General:  Alert and oriented.  Diffuse skin problem:  None.  Eye:  Pupils are equal, round and reactive to  light, extraocular movements are intact, Normal conjunctiva.  HENT:  Normocephalic, oral mucosa is moist.  Head:  Normocephalic, atraumatic.  Neck:  Supple, non-tender, no carotid bruit, no jugular venous distention, no lymphadenopathy, no thyromegaly.  Respiratory:  Lungs are clear to auscultation, respirations are non-labored, breath sounds are equal, symmetrical chest wall expansion.  Cardiovascular:  Normal rate, regular rhythm, no murmur, no edema.  Gastrointestinal:  Soft, non-tender, non-distended, normal bowel sounds, no organomegaly.  Lymphatics:  No lymphadenopathy neck, axilla, groin.  Musculoskeletal: Normal range of motion.  normal strength.  Feet:  Normal pulses.  Neurologic: Slurred speech, right upper extremity weakness.   Cognition and Speech:  Oriented, speech clear and coherent.  Psychiatric:  Cooperative, appropriate mood & affect.      Laboratory Data:   Lab Results   Component Value Date    WBC 9.2 03/19/2024    HGB 13.0 03/19/2024    HCT 41.3 03/19/2024    .0 03/19/2024    CREATSERUM 0.82 03/19/2024    BUN 14 03/19/2024     03/19/2024    K 4.0 03/19/2024     03/19/2024    CO2 26.0 03/19/2024     03/19/2024    CA 10.2 03/19/2024    ALB 4.9 03/19/2024    ALKPHO 66 03/19/2024    BILT 0.5 03/19/2024    TP 7.9 03/19/2024    AST 25 03/19/2024    ALT 15 03/19/2024    PTT 26.5 03/19/2024    INR 0.89 03/19/2024       Imaging:  No results found.     Assessment and Plan:    Right arm weakness  Slurred speech  Left frontal intraparenchymal hematoma w/ surrounding vasogenic edema  -Admitted to ICU for closer observation.  Patient is at high risk for decompensation.  Noted to have significantly elevated blood pressure on presentation with SBP > 190 which could be a cause of intraparenchymal bleed.   -Neurochecks every 2 hours  -Repeat CT head in the a.m. to reevaluate hemorrhage  -Neurology on consult (Dr. Konyukhov notified)  -Neurosurgery on consult (  Mauro)  -Pulmonology/critical care on consult (Dr. Russo notified)  -No antiplatelets/anticoagulation   -Maintain n.p.o.  -PT/OT/Speech therapy consulted   -SCDs for DVT prophylaxis    Elevated blood pressure  -No history of essential hypertension but noted to have SBP above 190.  -Received labetalol x 2 with mild improvement of blood pressure  -Will continue nicardipine drip to maintain SBP less than 160.     H/o hyperlipidemia    Prophylaxis  SCDs, intracranial bleed    CODE STATUS  Full    Primary care physician  Cristiano Vega MD    60 minutes spent on this admission - examining patient, obtaining history, reviewing previous medical records, going over test results/imaging and discussing plan of care. All questions answered.     Disposition  Clinical course will dictate outcome      Brooke Moreno MD  3/20/2024  12:49 AM

## 2024-03-20 NOTE — DISCHARGE INSTRUCTIONS
Plan transfer to [facility] for neurointerventional evaluation  Further recommendations per MD at [facility]

## 2024-03-21 ENCOUNTER — APPOINTMENT (OUTPATIENT)
Dept: CT IMAGING | Facility: HOSPITAL | Age: 64
End: 2024-03-21
Attending: INTERNAL MEDICINE
Payer: MEDICARE

## 2024-03-21 ENCOUNTER — APPOINTMENT (OUTPATIENT)
Dept: CT IMAGING | Facility: HOSPITAL | Age: 64
End: 2024-03-21
Attending: NEUROLOGICAL SURGERY
Payer: MEDICARE

## 2024-03-21 ENCOUNTER — NURSE ONLY (OUTPATIENT)
Dept: ELECTROPHYSIOLOGY | Facility: HOSPITAL | Age: 64
End: 2024-03-21
Attending: INTERNAL MEDICINE
Payer: MEDICARE

## 2024-03-21 ENCOUNTER — APPOINTMENT (OUTPATIENT)
Dept: INTERVENTIONAL RADIOLOGY/VASCULAR | Facility: HOSPITAL | Age: 64
End: 2024-03-21
Attending: NURSE PRACTITIONER
Payer: MEDICARE

## 2024-03-21 ENCOUNTER — APPOINTMENT (OUTPATIENT)
Dept: CT IMAGING | Facility: HOSPITAL | Age: 64
End: 2024-03-21
Attending: NURSE PRACTITIONER
Payer: MEDICARE

## 2024-03-21 ENCOUNTER — ANESTHESIA (OUTPATIENT)
Dept: SURGERY | Facility: HOSPITAL | Age: 64
End: 2024-03-21
Payer: MEDICARE

## 2024-03-21 ENCOUNTER — APPOINTMENT (OUTPATIENT)
Dept: ULTRASOUND IMAGING | Facility: HOSPITAL | Age: 64
End: 2024-03-21
Attending: NEUROLOGICAL SURGERY
Payer: MEDICARE

## 2024-03-21 ENCOUNTER — ANESTHESIA EVENT (OUTPATIENT)
Dept: SURGERY | Facility: HOSPITAL | Age: 64
End: 2024-03-21
Payer: MEDICARE

## 2024-03-21 PROBLEM — G93.6 VASOGENIC BRAIN EDEMA (HCC): Status: ACTIVE | Noted: 2024-03-21

## 2024-03-21 PROBLEM — I61.1 NONTRAUMATIC CORTICAL HEMORRHAGE OF LEFT CEREBRAL HEMISPHERE (HCC): Status: ACTIVE | Noted: 2024-03-21

## 2024-03-21 PROBLEM — I10 HYPERTENSION: Status: ACTIVE | Noted: 2024-03-21

## 2024-03-21 LAB
ANION GAP SERPL CALC-SCNC: 3 MMOL/L (ref 0–18)
BASOPHILS # BLD AUTO: 0.06 X10(3) UL (ref 0–0.2)
BASOPHILS NFR BLD AUTO: 0.8 %
BUN BLD-MCNC: 16 MG/DL (ref 9–23)
CALCIUM BLD-MCNC: 8.7 MG/DL (ref 8.5–10.1)
CHLORIDE SERPL-SCNC: 113 MMOL/L (ref 98–112)
CO2 SERPL-SCNC: 24 MMOL/L (ref 21–32)
CREAT BLD-MCNC: 0.59 MG/DL
EGFRCR SERPLBLD CKD-EPI 2021: 100 ML/MIN/1.73M2 (ref 60–?)
EOSINOPHIL # BLD AUTO: 0.1 X10(3) UL (ref 0–0.7)
EOSINOPHIL NFR BLD AUTO: 1.3 %
ERYTHROCYTE [DISTWIDTH] IN BLOOD BY AUTOMATED COUNT: 14.5 %
GLUCOSE BLD-MCNC: 114 MG/DL (ref 70–99)
GLUCOSE BLD-MCNC: 116 MG/DL (ref 70–99)
HCT VFR BLD AUTO: 37.8 %
HGB BLD-MCNC: 12.4 G/DL
IMM GRANULOCYTES # BLD AUTO: 0.02 X10(3) UL (ref 0–1)
IMM GRANULOCYTES NFR BLD: 0.3 %
LYMPHOCYTES # BLD AUTO: 2.28 X10(3) UL (ref 1–4)
LYMPHOCYTES NFR BLD AUTO: 30.1 %
MCH RBC QN AUTO: 29.2 PG (ref 26–34)
MCHC RBC AUTO-ENTMCNC: 32.8 G/DL (ref 31–37)
MCV RBC AUTO: 89.2 FL
MONOCYTES # BLD AUTO: 0.82 X10(3) UL (ref 0.1–1)
MONOCYTES NFR BLD AUTO: 10.8 %
NEUTROPHILS # BLD AUTO: 4.29 X10 (3) UL (ref 1.5–7.7)
NEUTROPHILS # BLD AUTO: 4.29 X10(3) UL (ref 1.5–7.7)
NEUTROPHILS NFR BLD AUTO: 56.7 %
OSMOLALITY SERPL CALC.SUM OF ELEC: 292 MOSM/KG (ref 275–295)
PLATELET # BLD AUTO: 292 10(3)UL (ref 150–450)
POTASSIUM SERPL-SCNC: 3.5 MMOL/L (ref 3.5–5.1)
RBC # BLD AUTO: 4.24 X10(6)UL
SODIUM SERPL-SCNC: 140 MMOL/L (ref 136–145)
WBC # BLD AUTO: 7.6 X10(3) UL (ref 4–11)

## 2024-03-21 PROCEDURE — 99291 CRITICAL CARE FIRST HOUR: CPT | Performed by: NURSE PRACTITIONER

## 2024-03-21 PROCEDURE — 99232 SBSQ HOSP IP/OBS MODERATE 35: CPT | Performed by: STUDENT IN AN ORGANIZED HEALTH CARE EDUCATION/TRAINING PROGRAM

## 2024-03-21 PROCEDURE — 95720 EEG PHY/QHP EA INCR W/VEEG: CPT | Performed by: OTHER

## 2024-03-21 PROCEDURE — 70450 CT HEAD/BRAIN W/O DYE: CPT | Performed by: INTERNAL MEDICINE

## 2024-03-21 PROCEDURE — 00C70ZZ EXTIRPATION OF MATTER FROM CEREBRAL HEMISPHERE, OPEN APPROACH: ICD-10-PCS | Performed by: NEUROLOGICAL SURGERY

## 2024-03-21 PROCEDURE — 99291 CRITICAL CARE FIRST HOUR: CPT

## 2024-03-21 PROCEDURE — 99291 CRITICAL CARE FIRST HOUR: CPT | Performed by: NEUROLOGICAL SURGERY

## 2024-03-21 PROCEDURE — B31RYZZ FLUOROSCOPY OF INTRACRANIAL ARTERIES USING OTHER CONTRAST: ICD-10-PCS

## 2024-03-21 PROCEDURE — 76998 US GUIDE INTRAOP: CPT | Performed by: NEUROLOGICAL SURGERY

## 2024-03-21 PROCEDURE — B040ZZZ ULTRASONOGRAPHY OF BRAIN: ICD-10-PCS | Performed by: NEUROLOGICAL SURGERY

## 2024-03-21 PROCEDURE — 99292 CRITICAL CARE ADDL 30 MIN: CPT | Performed by: NEUROLOGICAL SURGERY

## 2024-03-21 PROCEDURE — 99292 CRITICAL CARE ADDL 30 MIN: CPT | Performed by: INTERNAL MEDICINE

## 2024-03-21 PROCEDURE — 70450 CT HEAD/BRAIN W/O DYE: CPT | Performed by: NURSE PRACTITIONER

## 2024-03-21 PROCEDURE — 99291 CRITICAL CARE FIRST HOUR: CPT | Performed by: INTERNAL MEDICINE

## 2024-03-21 DEVICE — SCREW, AXS, SELF-DRILLING
Type: IMPLANTABLE DEVICE | Site: HEAD | Status: FUNCTIONAL
Brand: UNIVERSAL NEURO 3

## 2024-03-21 DEVICE — BURR HOLE COVER, WITH TAB, 14MM
Type: IMPLANTABLE DEVICE | Site: HEAD | Status: FUNCTIONAL
Brand: UNIVERSAL NEURO 3

## 2024-03-21 DEVICE — STRAIGHT PLATE, 12MM BAR, WITH TAB
Type: IMPLANTABLE DEVICE | Site: HEAD | Status: FUNCTIONAL
Brand: UNIVERSAL NEURO 3

## 2024-03-21 RX ORDER — LIDOCAINE HYDROCHLORIDE 10 MG/ML
INJECTION, SOLUTION INFILTRATION; PERINEURAL
Status: COMPLETED
Start: 2024-03-21 | End: 2024-03-21

## 2024-03-21 RX ORDER — MORPHINE SULFATE 2 MG/ML
1 INJECTION, SOLUTION INTRAMUSCULAR; INTRAVENOUS EVERY 2 HOUR PRN
Status: DISCONTINUED | OUTPATIENT
Start: 2024-03-21 | End: 2024-03-22 | Stop reason: HOSPADM

## 2024-03-21 RX ORDER — VERAPAMIL HYDROCHLORIDE 2.5 MG/ML
INJECTION, SOLUTION INTRAVENOUS
Status: COMPLETED
Start: 2024-03-21 | End: 2024-03-21

## 2024-03-21 RX ORDER — LEVETIRACETAM 500 MG/5ML
500 INJECTION, SOLUTION, CONCENTRATE INTRAVENOUS EVERY 12 HOURS
Status: DISCONTINUED | OUTPATIENT
Start: 2024-03-21 | End: 2024-03-21

## 2024-03-21 RX ORDER — SODIUM CHLORIDE 9 MG/ML
INJECTION, SOLUTION INTRAVENOUS CONTINUOUS PRN
Status: DISCONTINUED | OUTPATIENT
Start: 2024-03-21 | End: 2024-03-22 | Stop reason: SURG

## 2024-03-21 RX ORDER — IODIXANOL 320 MG/ML
300 INJECTION, SOLUTION INTRAVASCULAR
Status: COMPLETED | OUTPATIENT
Start: 2024-03-21 | End: 2024-03-21

## 2024-03-21 RX ORDER — ONDANSETRON 2 MG/ML
4 INJECTION INTRAMUSCULAR; INTRAVENOUS EVERY 6 HOURS PRN
Status: DISCONTINUED | OUTPATIENT
Start: 2024-03-21 | End: 2024-03-21

## 2024-03-21 RX ORDER — HEPARIN SODIUM 5000 [USP'U]/ML
INJECTION, SOLUTION INTRAVENOUS; SUBCUTANEOUS
Status: COMPLETED
Start: 2024-03-21 | End: 2024-03-21

## 2024-03-21 RX ORDER — MIDAZOLAM HYDROCHLORIDE 1 MG/ML
2 INJECTION INTRAMUSCULAR; INTRAVENOUS
Status: DISCONTINUED | OUTPATIENT
Start: 2024-03-21 | End: 2024-03-29

## 2024-03-21 RX ORDER — DEXAMETHASONE SODIUM PHOSPHATE 4 MG/ML
VIAL (ML) INJECTION AS NEEDED
Status: DISCONTINUED | OUTPATIENT
Start: 2024-03-21 | End: 2024-03-22 | Stop reason: SURG

## 2024-03-21 RX ORDER — MIDAZOLAM HYDROCHLORIDE 1 MG/ML
INJECTION INTRAMUSCULAR; INTRAVENOUS
Status: COMPLETED
Start: 2024-03-21 | End: 2024-03-21

## 2024-03-21 RX ORDER — LORAZEPAM 2 MG/ML
2 INJECTION INTRAMUSCULAR ONCE
Status: COMPLETED | OUTPATIENT
Start: 2024-03-21 | End: 2024-03-21

## 2024-03-21 RX ORDER — METOCLOPRAMIDE HYDROCHLORIDE 5 MG/ML
10 INJECTION INTRAMUSCULAR; INTRAVENOUS EVERY 8 HOURS PRN
Status: DISCONTINUED | OUTPATIENT
Start: 2024-03-21 | End: 2024-03-21

## 2024-03-21 RX ORDER — TRAMADOL HYDROCHLORIDE 50 MG/1
50 TABLET ORAL EVERY 6 HOURS PRN
Status: DISCONTINUED | OUTPATIENT
Start: 2024-03-21 | End: 2024-03-29

## 2024-03-21 RX ORDER — ROCURONIUM BROMIDE 10 MG/ML
INJECTION, SOLUTION INTRAVENOUS AS NEEDED
Status: DISCONTINUED | OUTPATIENT
Start: 2024-03-21 | End: 2024-03-22 | Stop reason: SURG

## 2024-03-21 RX ORDER — LEVETIRACETAM 500 MG/5ML
1000 INJECTION, SOLUTION, CONCENTRATE INTRAVENOUS EVERY 12 HOURS
Status: DISCONTINUED | OUTPATIENT
Start: 2024-03-22 | End: 2024-03-23

## 2024-03-21 RX ORDER — ACETAMINOPHEN 650 MG/1
650 SUPPOSITORY RECTAL EVERY 4 HOURS PRN
Status: DISCONTINUED | OUTPATIENT
Start: 2024-03-21 | End: 2024-03-21

## 2024-03-21 RX ORDER — LABETALOL HYDROCHLORIDE 5 MG/ML
10 INJECTION, SOLUTION INTRAVENOUS EVERY 10 MIN PRN
Status: DISCONTINUED | OUTPATIENT
Start: 2024-03-21 | End: 2024-03-21

## 2024-03-21 RX ORDER — LIDOCAINE HYDROCHLORIDE 10 MG/ML
INJECTION, SOLUTION EPIDURAL; INFILTRATION; INTRACAUDAL; PERINEURAL AS NEEDED
Status: DISCONTINUED | OUTPATIENT
Start: 2024-03-21 | End: 2024-03-22 | Stop reason: SURG

## 2024-03-21 RX ORDER — MANNITOL 20 G/100ML
INJECTION, SOLUTION INTRAVENOUS AS NEEDED
Status: DISCONTINUED | OUTPATIENT
Start: 2024-03-21 | End: 2024-03-22 | Stop reason: SURG

## 2024-03-21 RX ORDER — NITROGLYCERIN 20 MG/100ML
INJECTION INTRAVENOUS
Status: COMPLETED
Start: 2024-03-21 | End: 2024-03-21

## 2024-03-21 RX ORDER — CEFAZOLIN SODIUM/WATER 2 G/20 ML
SYRINGE (ML) INTRAVENOUS AS NEEDED
Status: DISCONTINUED | OUTPATIENT
Start: 2024-03-21 | End: 2024-03-22 | Stop reason: SURG

## 2024-03-21 RX ORDER — MORPHINE SULFATE 2 MG/ML
2 INJECTION, SOLUTION INTRAMUSCULAR; INTRAVENOUS EVERY 2 HOUR PRN
Status: DISCONTINUED | OUTPATIENT
Start: 2024-03-21 | End: 2024-03-22 | Stop reason: HOSPADM

## 2024-03-21 RX ORDER — LIDOCAINE HYDROCHLORIDE AND EPINEPHRINE 10; 10 MG/ML; UG/ML
INJECTION, SOLUTION INFILTRATION; PERINEURAL AS NEEDED
Status: DISCONTINUED | OUTPATIENT
Start: 2024-03-21 | End: 2024-03-22 | Stop reason: HOSPADM

## 2024-03-21 RX ORDER — ACETAMINOPHEN 325 MG/1
650 TABLET ORAL EVERY 4 HOURS PRN
Status: DISCONTINUED | OUTPATIENT
Start: 2024-03-21 | End: 2024-03-21

## 2024-03-21 RX ORDER — LORAZEPAM 2 MG/ML
INJECTION INTRAMUSCULAR
Status: COMPLETED
Start: 2024-03-21 | End: 2024-03-21

## 2024-03-21 RX ORDER — SODIUM CHLORIDE 9 MG/ML
INJECTION, SOLUTION INTRAVENOUS CONTINUOUS
Status: DISCONTINUED | OUTPATIENT
Start: 2024-03-21 | End: 2024-03-22

## 2024-03-21 RX ADMIN — MANNITOL 250 ML: 20 INJECTION, SOLUTION INTRAVENOUS at 22:01:00

## 2024-03-21 RX ADMIN — ROCURONIUM BROMIDE 10 MG: 10 INJECTION, SOLUTION INTRAVENOUS at 22:43:00

## 2024-03-21 RX ADMIN — SODIUM CHLORIDE: 9 INJECTION, SOLUTION INTRAVENOUS at 20:26:00

## 2024-03-21 RX ADMIN — ROCURONIUM BROMIDE 50 MG: 10 INJECTION, SOLUTION INTRAVENOUS at 20:32:00

## 2024-03-21 RX ADMIN — LIDOCAINE HYDROCHLORIDE 100 MG: 10 INJECTION, SOLUTION EPIDURAL; INFILTRATION; INTRACAUDAL; PERINEURAL at 20:32:00

## 2024-03-21 RX ADMIN — Medication 25 ML: at 21:56:00

## 2024-03-21 RX ADMIN — CEFAZOLIN SODIUM/WATER 2 G: 2 G/20 ML SYRINGE (ML) INTRAVENOUS at 21:03:00

## 2024-03-21 RX ADMIN — DEXAMETHASONE SODIUM PHOSPHATE 10 MG: 4 MG/ML VIAL (ML) INJECTION at 20:57:00

## 2024-03-21 RX ADMIN — ROCURONIUM BROMIDE 30 MG: 10 INJECTION, SOLUTION INTRAVENOUS at 21:21:00

## 2024-03-21 NOTE — OCCUPATIONAL THERAPY NOTE
Attempted to see patient for OT evaluation, however patient currently off floor for angiogram.  Will continue to follow and evaluate as clinically appropriate.

## 2024-03-21 NOTE — PROCEDURES
[facility]   part of [facility]     Interventional Neuroradiology Procedure Note      Procedure: Diagnostic Cerebral Angiogram    Pre-Op Diagnosis:  L frontal IPH    Post-Op Diagnosis: same    Surgeon: Gabino Philip MD, PhD    Technique/Findings:    5F Right Radial Sheath  5F [place]-2 Glidecatheter    No evidence of intracranial arteriovenous malformation or dural/pial arteriovenous fistula.    MRI/MRA/MRV brain imaging is recommended as baseline with an interval 3 month follow-up to exclude underlying pathology that may be occult in the acute intracranial hemorrhage setting.    Full report to follow      Anesthesia:  Local and Sedation    Complications:  None    Medications:  Midazolam 1 mg IV  Fentanyl 50 ug IV    Heparin 1500 units IA  Verapamil 5mg IA  Nitroglycerin 200ug IA    Blood Loss:  < 25 mL     Assessment/Plan:  Lay flat or reverse Trendelenburg 4 hours   Immobilize wrist for 2 hours as per TR band deflation protocol   Assess vitals/access site/pulses and neurological status in post-procedure unit      Gabino Philip MD, PhD  3/21/2024  12:18 PM

## 2024-03-21 NOTE — H&P
[facility]     Leanne Ching Patient Status:  Inpatient    1960 MRN [id number]   Location [facility] 6NE-A Attending Mandeep Bojorquez*   Hosp Day # 0 PCP Shelby Frost MD     Chief Complaint: Slurred speech and right upper extremity weakness    Subjective:    History of Present Illness:     Leanne Ching is a 63 year old female with history of hypertension, hyperlipidemia presented to [facility] last night with sudden onset of slurred speech and right arm weakness.  Patient was starting to have some difficulty with speaking around 1030 last night.  It was after the slurred speech that she started having some right arm weakness.  No headache and, vision changes.  Neurosurgery here [facility] reviewed the case and recommended transfer to [facility] for diagnostic cerebral angiogram to be done tomorrow morning.  So far there is been no progression or regression of patient's symptoms.  No fevers, chills, vomiting, diarrhea.  No urinary or fecal incontinence.     History/Other:    Past Medical History:  Past Medical History:   Diagnosis Date    Diverticulosis large intestine w/o perforation or abscess w/o bleeding 2018    High cholesterol     Hyperlipidemia     Internal hemorrhoids 2018     Past Surgical History:   Past Surgical History:   Procedure Laterality Date    ABLATION      COLONOSCOPY N/A 2018    Procedure: COLONOSCOPY;  Surgeon: Jacinda Cruz MD;  Location: [facility] ENDOSCOPY          x 2      Family History:   Family History   Problem Relation Age of Onset    Cancer Paternal Grandmother 70        UTERINE     Social History:    reports that she has never smoked. She has never used smokeless tobacco. She reports that she does not drink alcohol and does not use drugs.     Allergies: No Known Allergies    Medications:    Current Facility-Administered Medications on File Prior to Encounter   Medication Dose Route  Frequency Provider Last Rate Last Admin    [COMPLETED] iopamidol (ISOVUE-370) 76 % injection 50 mL  50 mL Intravenous ONCE PRN Riley Angeles MD   50 mL at 03/20/24 0004    [COMPLETED] labetalol (Trandate) 5 mg/mL injection 20 mg  20 mg Intravenous Once Riley Angeles MD   20 mg at 03/20/24 0009    [COMPLETED] labetalol (Trandate) 5 mg/mL injection 10 mg  10 mg Intravenous Once Riley Angeles MD   10 mg at 03/20/24 0021     Current Outpatient Medications on File Prior to Encounter   Medication Sig Dispense Refill    niCARdipine in sodium chloride 0.86% 20 mg/200mL Intravenous Solution Inject 5-15 mg/hr into the vein continuous.      acetaminophen 325 MG Oral Tab Take 2 tablets (650 mg total) by mouth every 4 (four) hours as needed.      ondansetron 4 MG/2ML Injection Solution Inject 2 mL (4 mg total) into the vein every 6 (six) hours as needed.      polyethylene glycol, PEG 3350, 17 g Oral Powd Pack Take 17 g by mouth daily as needed (If no bowel movement in last 24 hours).      sennosides 17.2 MG Oral Tab Take 1 tablet (17.2 mg total) by mouth nightly as needed (constipation, as needed if no bowel movement that day).      bisacodyl 10 MG Rectal Suppos Place 1 suppository (10 mg total) rectally daily as needed.      [START ON 3/21/2024] sodium chloride 0.9% PF 0.9% SOLN 10 mL with pantoprazole 40 MG SOLR 40 mg Inject 40 mg into the vein daily.      amLODIPine 5 MG Oral Tab Take 1 tablet (5 mg total) by mouth daily.      sodium chloride 0.9% Intravenous Inject into the vein continuous.      simvastatin 10 MG Oral Tab Take 1 tablet (10 mg total) by mouth nightly.         Review of Systems:   A comprehensive review of systems was completed.    Pertinent positives and negatives noted in the HPI.    Objective:   Physical Exam:    /69   Pulse 82   Temp 97.6 °F (36.4 °C) (Temporal)   Resp 16   SpO2 97%   General: No acute distress, Alert  Respiratory: No rhonchi, no wheezes  Cardiovascular: S1, S2.  Regular rate and rhythm  Abdomen: Soft, Non-tender, non-distended, positive bowel sounds  Neuro: MS in RUE +3/5, MS LUE +5/5  Extremities: No edema      Results:    Labs:      Labs Last 24 Hours:    Recent Labs   Lab 03/19/24  2354   RBC 4.59   HGB 13.0   HCT 41.3   MCV 90.0   MCH 28.3   MCHC 31.5   RDW 14.0   NEPRELIM 4.16   WBC 9.2   .0       Recent Labs   Lab 03/19/24  2354   *   BUN 14   CREATSERUM 0.82   EGFRCR 79   CA 10.2   ALB 4.9*      K 4.0      CO2 26.0   ALKPHO 66   AST 25   ALT 15   BILT 0.5   TP 7.9       Lab Results   Component Value Date    INR 0.89 03/19/2024       No results for input(s): \"TROP\", \"TROPHS\", \"CK\" in the last 168 hours.    No results for input(s): \"TROP\", \"PBNP\" in the last 168 hours.    No results for input(s): \"PCT\" in the last 168 hours.    Imaging: Imaging data reviewed in Epic.    Assessment & Plan:      # Left frontal intraparenchymal hematoma- will admit to NICU  - neurosurgery on consult  -cerebral angiogram in the am  -continue ICH protocol    # Vasogenic edema  - mild    # Prediabetes  - Will monitor blood sugars for now  -HgbA1c 6.0.    Plan of care discussed with patient at bedside  Mandeep Bojorquez,   Total critical care time: 45minutes  22:45-23:30    Supplementary Documentation:     The 21st Century Cures Act makes medical notes like these available to patients in the interest of transparency. Please be advised this is a medical document. Medical documents are intended to carry relevant information, facts as evident, and the clinical opinion of the practitioner. The medical note is intended as peer to peer communication and may appear blunt or direct. It is written in medical language and may contain abbreviations or verbiage that are unfamiliar.               **Certification      PHYSICIAN Certification of Need for Inpatient Hospitalization - Initial Certification    Patient will require inpatient services that will reasonably be  expected to span two midnight's based on the clinical documentation in H+P.   Based on patients current state of illness, I anticipate that, after discharge, patient will require TBD.

## 2024-03-21 NOTE — SLP NOTE
Order received, chart reviewed. Attempted to see patient x2 today; first, patient had returned from angio and quite groggy still. Currently attempted however deferred after clinical d/w RN (?seizure like activity). SLP to follow-up at another time.   Monique Adorno, MS CCC-SLP/L, pager [phone]  Speech-Language Pathologist

## 2024-03-21 NOTE — PHYSICAL THERAPY NOTE
Physical Therapy    Order for PT eval received.  Pt having cerebral angiogram today.  Will hold today and re-assess appropriateness of session tomorrow.

## 2024-03-21 NOTE — CONSULTS
[facility]  Neurosurgery Consult    Leanne Ching Patient Status:  Inpatient    1960 MRN [id number]   Location [facility] 6NE-A Attending Mendel Dueñas, *   Hosp Day # 1 PCP Shelby Frost MD     REASON FOR CONSULTATION:  ICH, right sided weakness    HISTORY OF PRESENT ILLNESS:  Leanne Ching is a(n) 63 year old female with PMH of HLD who presented to the ED at [facility] after developing sudden onset of slurred speech and aphasia and right sided weakness. CT head demonstrated a large frontal IPH with surrounding vasogenic edema. She was not previously on any thinners. There was a spot sign which was concerning for active bleeding or underlying vascular pathology. She was then transferred to [facility] for a higher level of care.     This morning a follow up CT was performed which did not show any worsening of hemorrhage. A cerebral angiogram was performed by Dr. Philip which was negative for any Cerebral AVM or AV fistulas.     Prior to angiogram she was alert with severe expressive aphasia. Her  was at bedside along with 2 of her adult children who just flew in to be with her.     PAST MEDICAL HISTORY:  Past Medical History:   Diagnosis Date    Diverticulosis large intestine w/o perforation or abscess w/o bleeding 2018    High cholesterol     Hyperlipidemia     Hypertension 3/21/2024    Internal hemorrhoids 2018       PAST SURGICAL HISTORY:  Past Surgical History:   Procedure Laterality Date    ABLATION      COLONOSCOPY N/A 2018    Procedure: COLONOSCOPY;  Surgeon: Jacinda Cruz MD;  Location: [facility] ENDOSCOPY          x 2       FAMILY HISTORY:  family history includes Cancer (age of onset: 70) in her paternal grandmother.    SOCIAL HISTORY:   reports that she has never smoked. She has never used smokeless tobacco. She reports that she does not drink alcohol and does not use drugs.    ALLERGIES:  No Known  Allergies    MEDICATIONS:  Medications Prior to Admission   Medication Sig Dispense Refill Last Dose    niCARdipine in sodium chloride 0.86% 20 mg/200mL Intravenous Solution Inject 5-15 mg/hr into the vein continuous.       acetaminophen 325 MG Oral Tab Take 2 tablets (650 mg total) by mouth every 4 (four) hours as needed.       ondansetron 4 MG/2ML Injection Solution Inject 2 mL (4 mg total) into the vein every 6 (six) hours as needed.       polyethylene glycol, PEG 3350, 17 g Oral Powd Pack Take 17 g by mouth daily as needed (If no bowel movement in last 24 hours).       sennosides 17.2 MG Oral Tab Take 1 tablet (17.2 mg total) by mouth nightly as needed (constipation, as needed if no bowel movement that day).       bisacodyl 10 MG Rectal Suppos Place 1 suppository (10 mg total) rectally daily as needed.       sodium chloride 0.9% PF 0.9% SOLN 10 mL with pantoprazole 40 MG SOLR 40 mg Inject 40 mg into the vein daily.       amLODIPine 5 MG Oral Tab Take 1 tablet (5 mg total) by mouth daily.       sodium chloride 0.9% Intravenous Inject into the vein continuous.       simvastatin 10 MG Oral Tab Take 1 tablet (10 mg total) by mouth nightly.        Current Facility-Administered Medications   Medication Dose Route Frequency    levETIRAcetam (Keppra) 500 mg/5mL injection 500 mg  500 mg Intravenous Q12H    midazolam (Versed) 2 MG/2ML injection 2 mg  2 mg Intravenous Q15 Min PRN    traMADol (Ultram) tab 50 mg  50 mg Oral Q6H PRN    morphINE PF 2 MG/ML injection 1 mg  1 mg Intravenous Q2H PRN    Or    morphINE PF 2 MG/ML injection 2 mg  2 mg Intravenous Q2H PRN    niCARdipine in sodium chloride 0.86% (carDENE) 20 mg/200mL infusion premix  5-15 mg/hr Intravenous Continuous PRN    sodium chloride 0.9% infusion   Intravenous Continuous    acetaminophen (Tylenol) tab 650 mg  650 mg Oral Q4H PRN    Or    acetaminophen (Tylenol) rectal suppository 650 mg  650 mg Rectal Q4H PRN    labetalol (Trandate) 5 mg/mL injection 10 mg  10  mg Intravenous Q10 Min PRN    hydrALAzine (Apresoline) 20 mg/mL injection 10 mg  10 mg Intravenous Q2H PRN    ondansetron (Zofran) 4 MG/2ML injection 4 mg  4 mg Intravenous Q6H PRN    Or    metoclopramide (Reglan) 5 mg/mL injection 10 mg  10 mg Intravenous Q8H PRN    melatonin tab 3 mg  3 mg Oral Nightly PRN    acetaminophen (Tylenol Extra Strength) tab 500 mg  500 mg Oral Q4H PRN    polyethylene glycol (PEG 3350) (Miralax) 17 g oral packet 17 g  17 g Oral Daily PRN    sennosides (Senokot) tab 17.2 mg  17.2 mg Oral Nightly PRN    bisacodyl (Dulcolax) 10 MG rectal suppository 10 mg  10 mg Rectal Daily PRN    fleet enema (Fleet) 7-19 GM/118ML rectal enema 133 mL  1 enema Rectal Once PRN       REVIEW OF SYSTEMS:  A 10-point system was reviewed.  Pertinent positives and negatives are noted in HPI.      PHYSICAL EXAMINATION:  VITAL SIGNS: /81   Pulse 65   Temp 97.9 °F (36.6 °C) (Temporal)   Resp 15   Ht 65\"   Wt 135 lb 9.3 oz (61.5 kg)   SpO2 99%   BMI 22.56 kg/m²   GENERAL:  Patient is a 63 year old female in no acute distress.  HEENT:  Normocephalic, atraumatic.  NEUROLOGICAL:  Lethargic, opens eyes to verbal stimuli. Nods yes or no appropriately.   PERRLA +3 brisk,  EOMI.  Face is symmetrical. CN's GI.  Sensation decreased on the right.    RUE 1/5, LUE 4/5, LLE 4/5, RLE 3/5.      DIAGNOSTIC DATA:   Lab Results   Component Value Date    WBC 7.6 03/21/2024    HGB 12.4 03/21/2024    HCT 37.8 03/21/2024    .0 03/21/2024    CREATSERUM 0.59 03/21/2024    BUN 16 03/21/2024     03/21/2024    K 3.5 03/21/2024     03/21/2024    CO2 24.0 03/21/2024     03/21/2024    CA 8.7 03/21/2024       IMAGING:  CT BRAIN OR HEAD (71688)    Result Date: 3/21/2024  CONCLUSION:  4.5 x 3.7 x 3.4 cm acute intraparenchymal hematoma arising from the posterior left frontal lobe with localized mass effect and a small amount of surrounding vasogenic edema and mild midline shift to the right of 3 mm.  No  significant change.      LOCATION:  [facility]   Dictated by (CST): Charis Barnes MD on 3/21/2024 at 6:25 AM     Finalized by (CST): Charis Barnes MD on 3/21/2024 at 6:36 AM       CT STROKE CTA BRAIN/CTA NECK (W IV)(CPT=70496/33992)    Result Date: 3/20/2024  CONCLUSION:   Large left frontal intraparenchymal hematoma again noted.  There is associated internal serpiginous enhancement, which may represent an abnormal tangle of vessels.  Finding raises the possibility of an underlying vascular malformation.  Neuro surgical consultation has already been placed at this time.  Further assessment with diagnostic interventional angiography and/or contrast enhanced MRI brain would be helpful.  Local mass effect from left frontal intraparenchymal hematoma as well as left to right 5 millimeter midline shift again noted.  No flow-limiting stenosis within the major arteries of the neck.  Approximately 36% narrowing of the left carotid bulb by atherosclerotic plaque.  No significant narrowing of the right carotid bulb.  No high-grade flow-limiting stenosis within the major arteries of the [id number] intracranially.     Dictated by (CST): Giselle Alexander MD on 3/20/2024 at 8:05 AM     Finalized by (CST): Giselle Alexander MD on 3/20/2024 at 8:22 AM          CT BRAIN OR HEAD (93212)    Result Date: 3/20/2024  CONCLUSION:  1. Large acute 4.5 x 3.5 cm intraparenchymal hematoma centered at the periphery of the left frontal lobe.  Although the hematoma largely appears similar in size and morphology since examination from approximately 6.5 hours prior, there is a small curvilinear band of acute hemorrhagic components along the posterior margin of the hematoma, which are new since prior.  Mild surrounding vasogenic edema appears unchanged.  Stable left cerebral hemispheric mass effect with mild 2-3 mm left-to-right midline shift.  Continued close follow-up to resolution is advised. 2. Intracranial atherosclerosis.   Dictated by  (CST): Finesse Martinez MD on 3/20/2024 at 7:54 AM     Finalized by (CST): Finesse Martinez MD on 3/20/2024 at 7:59 AM          XR CHEST AP PORTABLE  (CPT=71045)    Result Date: 3/20/2024  CONCLUSION:   No radiographic evidence of acute cardiopulmonary process.  A preliminary report was issued by the [facility] teleradiology service. There are no clinically actionable discrepancies.    Dictated by (CST): Giselle Alexander MD on 3/20/2024 at 7:46 AM     Finalized by (CST): Giselle Alexander MD on 3/20/2024 at 7:46 AM          CT STROKE BRAIN (NO IV)(CPT=70450)    Result Date: 3/20/2024  CONCLUSION:   Large 4.8 centimeter left frontal intraparenchymal hematoma with moderate local mass effect, as well as 3 millimeters left-to-right midline shift.  Neurosurgical consultation is recommended.  Follow-up CT brain is recommended.  Differential considerations include underlying vascular etiologies such as AVM, a hemorrhagic mass, or other etiologies.   This report was called immediately at 105 Eastern time on 03/20/2024 hours to Dr. Angeles by Dr Bryant Reynoso   A preliminary report was issued by the [facility] teleradiology service. There are no clinically actionable discrepancies.  Dictated by (CST): Giselle Alexander MD on 3/20/2024 at 7:16 AM     Finalized by (CST): Giselle Alexander MD on 3/20/2024 at 7:23 AM            ASSESSMENT:  64 yo female with left frontal ICH    Plan:  No surgical intervention at this time  Neuro checks  Seizure prophylaxis  Keppra per NCC  Avoid hyponatremia  PT/OT/SLP    ALISON Lozada  [facility]  3/21/2024, 1:23 PM   Spectre [id number]      A total of 60  minutes of visit time (exclusible of billable procedures) was administered.  > 50 % of time spent counseling/coordinating care     Is this a shared or split note between Advanced Practice Provider and Physician? Yes

## 2024-03-21 NOTE — PROGRESS NOTES
[facility]  Neurocritical Care APRN Progress Note    NAME: Leanne Ching - ROOM: 12/6612-A - MRN: [id number] - Age: 63 year old - :1960    History Of Present Illness:  Leanne Ching is a 63 year old female with PMHx significant for HL who presented to [facility] with speech difficulty and right arm weakness on 3/20.  Ct showed a 3.6 x 3.6 x 4.8 left frontal ICH with vasogenic edema and 0.3 cm rightward MLS.  Her SBP was elevated to 170s so Cardene gtt was started.  CTA showed an area which may represent an abnormal tangle of vessels, possibly a vascular malformation.  She was transferred to [facility] for potential neurointervention in the morning.  CT on admit to [facility] showed a similar sized hematoma but also had a small acute component along the posterior margin.  Edema and mid-line shift were also unchanged.      Past Medical History:  Past Medical History:   Diagnosis Date    Diverticulosis large intestine w/o perforation or abscess w/o bleeding 2018    High cholesterol     Hyperlipidemia     Internal hemorrhoids 2018     Past Surgical History:   Past Surgical History:   Procedure Laterality Date    ABLATION      COLONOSCOPY N/A 2018    Procedure: COLONOSCOPY;  Surgeon: Jacinda Cruz MD;  Location: [facility] ENDOSCOPY          x 2      Family History:   Family History   Problem Relation Age of Onset    Cancer Paternal Grandmother 70        UTERINE     Social History:    reports that she has never smoked. She has never used smokeless tobacco. She reports that she does not drink alcohol and does not use drugs.     Review of Systems:   A comprehensive 10 point review of systems was completed.  Pertinent positives and negatives noted in the HPI.    Current Facility-Administered Medications   Medication Dose Route Frequency    sodium chloride 0.9% infusion   Intravenous Continuous    acetaminophen (Tylenol) tab 650 mg  650 mg Oral Q4H PRN    Or     acetaminophen (Tylenol) rectal suppository 650 mg  650 mg Rectal Q4H PRN    labetalol (Trandate) 5 mg/mL injection 10 mg  10 mg Intravenous Q10 Min PRN    hydrALAzine (Apresoline) 20 mg/mL injection 10 mg  10 mg Intravenous Q2H PRN    ondansetron (Zofran) 4 MG/2ML injection 4 mg  4 mg Intravenous Q6H PRN    Or    metoclopramide (Reglan) 5 mg/mL injection 10 mg  10 mg Intravenous Q8H PRN    melatonin tab 3 mg  3 mg Oral Nightly PRN    acetaminophen (Tylenol Extra Strength) tab 500 mg  500 mg Oral Q4H PRN    polyethylene glycol (PEG 3350) (Miralax) 17 g oral packet 17 g  17 g Oral Daily PRN    sennosides (Senokot) tab 17.2 mg  17.2 mg Oral Nightly PRN    bisacodyl (Dulcolax) 10 MG rectal suppository 10 mg  10 mg Rectal Daily PRN    fleet enema (Fleet) 7-19 GM/118ML rectal enema 133 mL  1 enema Rectal Once PRN     OBJECTIVE  Vitals:  /76   Pulse 80   Temp 97.6 °F (36.4 °C) (Temporal)   Resp 16   SpO2 97%             Physical Exam:    General Appearance: Alert, cooperative, no distress, appears stated age  Neck: Supple, symmetrical, trachea midline, no carotid bruits, adenopathy, or JVD  Lungs: Clear to auscultation bilaterally, respirations unlabored  Heart: Regular rate and rhythm, S1 and S2 normal, no murmur, rub or gallop  Abdomen: Soft, non-tender, bowel sounds active all four quadrants, no masses, no organomegaly  Extremities: Extremities normal, atraumatic, no cyanosis or edema, capillary refill <3 sec.    Pulses: 2+ and symmetric all extremities  Skin: Skin color, texture, turgor normal for ethnicity, no rashes or lesions, warm and dry  Neurologic: This patient is alert, unable to assess orientation due to aphasia, nods head to communicate but when asked if her name is her daughter's name she shook her head yes.  Pupils equally round and reactive to light.  3+ brisk bilaterally.  EOMs intact.  Visual fields are full.  Tongue is deviated to right but able to move side to side. Right sided full facial  droop, unable to lift eyebrow.  Shrug shoulders absent on right.  The rest of the cranial nerves are grossly intact.  Sensation to light touch is intact on left, decreased sensation on right, requires deep pain to obtain verbal moan.  Motor: No Drift on left arm or leg, right arm and leg are untestable due to weakness. Left arm and leg 4/5, right arm does not move 0/5, right leg 2/5.   Finger-to-nose coordination is intact on left arm.  Gait deferred.    Data this admission:  CT STROKE CTA BRAIN/CTA NECK (W IV)(CPT=70496/73958)    Result Date: 3/20/2024  CONCLUSION:   Large left frontal intraparenchymal hematoma again noted.  There is associated internal serpiginous enhancement, which may represent an abnormal tangle of vessels.  Finding raises the possibility of an underlying vascular malformation.  Neuro surgical consultation has already been placed at this time.  Further assessment with diagnostic interventional angiography and/or contrast enhanced MRI brain would be helpful.  Local mass effect from left frontal intraparenchymal hematoma as well as left to right 5 millimeter midline shift again noted.  No flow-limiting stenosis within the major arteries of the neck.  Approximately 36% narrowing of the left carotid bulb by atherosclerotic plaque.  No significant narrowing of the right carotid bulb.  No high-grade flow-limiting stenosis within the major arteries of the [id number] intracranially.     Dictated by (CST): Giselle Alexander MD on 3/20/2024 at 8:05 AM     Finalized by (CST): Giselle Alexander MD on 3/20/2024 at 8:22 AM          CT BRAIN OR HEAD (29714)    Result Date: 3/20/2024  CONCLUSION:  1. Large acute 4.5 x 3.5 cm intraparenchymal hematoma centered at the periphery of the left frontal lobe.  Although the hematoma largely appears similar in size and morphology since examination from approximately 6.5 hours prior, there is a small curvilinear band of acute hemorrhagic components along the  posterior margin of the hematoma, which are new since prior.  Mild surrounding vasogenic edema appears unchanged.  Stable left cerebral hemispheric mass effect with mild 2-3 mm left-to-right midline shift.  Continued close follow-up to resolution is advised. 2. Intracranial atherosclerosis.   Dictated by (CST): Finesse Martinez MD on 3/20/2024 at 7:54 AM     Finalized by (CST): Finesse Martinez MD on 3/20/2024 at 7:59 AM          XR CHEST AP PORTABLE  (CPT=71045)    Result Date: 3/20/2024  CONCLUSION:   No radiographic evidence of acute cardiopulmonary process.  A preliminary report was issued by the [facility] teleradiology service. There are no clinically actionable discrepancies.    Dictated by (CST): Giselle Alexander MD on 3/20/2024 at 7:46 AM     Finalized by (CST): Giselle Alexander MD on 3/20/2024 at 7:46 AM          CT STROKE BRAIN (NO IV)(CPT=70450)    Result Date: 3/20/2024  CONCLUSION:   Large 4.8 centimeter left frontal intraparenchymal hematoma with moderate local mass effect, as well as 3 millimeters left-to-right midline shift.  Neurosurgical consultation is recommended.  Follow-up CT brain is recommended.  Differential considerations include underlying vascular etiologies such as AVM, a hemorrhagic mass, or other etiologies.   This report was called immediately at 105 Eastern time on 03/20/2024 hours to Dr. Angeles by Dr Bryant Reynoso   A preliminary report was issued by the [facility] teleradiology service. There are no clinically actionable discrepancies.  Dictated by (CST): Giselle Alexander MD on 3/20/2024 at 7:16 AM     Finalized by (CST): Giselle Alexander MD on 3/20/2024 at 7:23 AM          Labs:     Assessment/Plan:  ICH  -Neuro checks Q1h  -Maintain SBP between 100-150 mmHg  -Labetalol 10mg IV PRN, Hydralazine 10 mg IV PRN, Nicardipine gtt, Neosynephrine gtt to maintain SBP   -Hold all anticoagulants and antiplatelets   -Keppra 500 mg BID  -Seizure precautions  -CT scan in am,  sooner if patient deteriorates   -PT, OT, and ST  -Neurosurgery consulted  -ICH score after initial brain imaging resulted is 1     Hypertension  -Now within parameters  -PRN medications as above    F/E/N:  -IV fluids  -Follow lytes and replete prn  -NPO for Angiogram in am, nector thick per Speech when able to have diet     ABCDEF Bundle  A- Assess, prevent and manage pain--prn pain medications  B- Spontaneous breathing trials--Room air  C- Sedation--RASS 0- alert and calm  D- Delirium--CAM-ICU negative  E- Early mobility--Bedrest  F- Family engagement--Updated at bedside  G- Central Line/ Dinero--no central line or dinero    Proph:  -No a/c at this time d/t evidence of bleeding  -SCD    Dispo:     Code Status: Full Code  -Neuro ICU monitoring    Plan of care discussed with Neuro-Intensivist On-Call, Dr. Graham.    Discussed with daughter in the room.  Explained tests and plan for angiogram.  Explained risks of ICH and potential problems.      Tamara ROSAS  Critical Care Nurse Practitioner  Spec [id number]    A total of 35 minutes of critical care time (exclusive of billable procedures) was administered. This involved direct patient intervention, complex decision making, and/or extensive discussions with the patient, family, and clinical staff.    The 21st Century Cures Act makes medical notes like these available to patients in the interest of transparency. Please be advised this is a medical document. Medical documents are intended to carry relevant information, facts as evident, and the clinical opinion of the practitioner. The medical note is intended as peer to peer communication and may appear blunt or direct. It is written in medical language and may contain abbreviations or verbiage that are unfamiliar.

## 2024-03-21 NOTE — PROGRESS NOTES
[facility]   part of [facility]     Hospitalist Progress Note     Leanne Ching Patient Status:  Inpatient    1960 MRN [id number]   Location [facility] 6NE-A Attending Dueñas, Mendel Berg, *   Hosp Day # 1 PCP Shelby Frost MD     Chief Complaint: RUE weakness    Subjective:      - Pt somnolent following angiogram, awakens to voice   - Family at bedside, all questions answered    Objective:    Review of Systems:   A comprehensive review of systems was completed; pertinent positive and negatives stated in subjective.    Vital signs:  Temp:  [97.2 °F (36.2 °C)-98.7 °F (37.1 °C)] 97.3 °F (36.3 °C)  Pulse:  [] 78  Resp:  [11-18] 13  BP: (111-165)/(67-94) 138/93  SpO2:  [96 %-100 %] 100 %    Physical Exam:    General: No acute distress  Respiratory: no wheezes, no rhonchi  Cardiovascular: S1, S2, regular rate and rhythm  Abdomen: Soft, Non-tender, non-distended, positive bowel sounds  Neuro: No new focal deficits.   Extremities: no edema    Diagnostic Data:    Labs:  Recent Labs   Lab 244 24  0437   WBC 9.2 7.6   HGB 13.0 12.4   MCV 90.0 89.2   .0 292.0   INR 0.89  --        Recent Labs   Lab 24  0437   * 116*   BUN 14 16   CREATSERUM 0.82 0.59   CA 10.2 8.7   ALB 4.9*  --     140   K 4.0 3.5    113*   CO2 26.0 24.0   ALKPHO 66  --    AST 25  --    ALT 15  --    BILT 0.5  --    TP 7.9  --        Estimated Creatinine Clearance: 92.3 mL/min (based on SCr of 0.59 mg/dL).    No results for input(s): \"TROP\", \"TROPHS\", \"CK\" in the last 168 hours.    Recent Labs   Lab 24  2354   PTP 12.5   INR 0.89                  Microbiology    No results found for this visit on 24.      Imaging: Reviewed in Epic.    Medications:    levETIRAcetam  500 mg Intravenous Q12H    potassium chloride  40 mEq Intravenous Once       Assessment & Plan:      # Left frontal intraparenchymal hematoma  -Close monitoring in neuro ICU  -Cerebral  angiogram without AV malformation or fistula  -MRI brain ordered  - neurosurgery on consult  -continue ICH protocol, BP goals per neuroCC      # Vasogenic edema  - mild     # Prediabetes  - Will monitor blood sugars for now  -HgbA1c 6.0.      Mendel Dueñas MD    Supplementary Documentation:     Quality:  DVT Mechanical Prophylaxis:   SCDs,    DVT Pharmacologic Prophylaxis   Medication   None                Code Status: Full Code  Miles: External urinary catheter in place  Miles Duration (in days):   Central line:    LISA:     The 21st Century Cures Act makes medical notes like these available to patients in the interest of transparency. Please be advised this is a medical document. Medical documents are intended to carry relevant information, facts as evident, and the clinical opinion of the practitioner. The medical note is intended as peer to peer communication and may appear blunt or direct. It is written in medical language and may contain abbreviations or verbiage that are unfamiliar.

## 2024-03-21 NOTE — CONSULTS
[facility]  Neurocritical Care Consult Note    Leanne Ching Patient Status:  Inpatient    1960 MRN [id number]   Location [facility] 6NE-A Attending Spenser, Mendel Berg, *   Hosp Day # 1 PCP Shelby Frost MD       Reason for Consultation:   L frontal ich     HPI:   Patient is a 63 year old female with a h/o hl p/w L frontal ich 3/19. Pt had onset of R sided weakness and slurred speech thus went to [facility] ED where w/u revealed SBP>190 and ct head with 3.6x3.6x4.8cm L frontal ich with mass effect and 3mm midline shift, and cta c/f underlying vasc malformation, thus pt was transferred to [facility] for further monitoring.     Past Medical History:   Diagnosis Date    Diverticulosis large intestine w/o perforation or abscess w/o bleeding 2018    High cholesterol     Hyperlipidemia     Hypertension 3/21/2024    Internal hemorrhoids 2018       Past Surgical History:   Procedure Laterality Date    ABLATION      COLONOSCOPY N/A 2018    Procedure: COLONOSCOPY;  Surgeon: Jacinda Cruz MD;  Location: [facility] ENDOSCOPY          x 2       Medications Prior to Admission   Medication Sig Dispense Refill Last Dose    niCARdipine in sodium chloride 0.86% 20 mg/200mL Intravenous Solution Inject 5-15 mg/hr into the vein continuous.       acetaminophen 325 MG Oral Tab Take 2 tablets (650 mg total) by mouth every 4 (four) hours as needed.       ondansetron 4 MG/2ML Injection Solution Inject 2 mL (4 mg total) into the vein every 6 (six) hours as needed.       polyethylene glycol, PEG 3350, 17 g Oral Powd Pack Take 17 g by mouth daily as needed (If no bowel movement in last 24 hours).       sennosides 17.2 MG Oral Tab Take 1 tablet (17.2 mg total) by mouth nightly as needed (constipation, as needed if no bowel movement that day).       bisacodyl 10 MG Rectal Suppos Place 1 suppository (10 mg total) rectally daily as needed.       sodium chloride 0.9% PF 0.9% SOLN 10 mL with  pantoprazole 40 MG SOLR 40 mg Inject 40 mg into the vein daily.       amLODIPine 5 MG Oral Tab Take 1 tablet (5 mg total) by mouth daily.       sodium chloride 0.9% Intravenous Inject into the vein continuous.       simvastatin 10 MG Oral Tab Take 1 tablet (10 mg total) by mouth nightly.        No Known Allergies  Social History     Socioeconomic History    Marital status:    Tobacco Use    Smoking status: Never    Smokeless tobacco: Never   Substance and Sexual Activity    Alcohol use: No    Drug use: No     Family History   Problem Relation Age of Onset    Cancer Paternal Grandmother 70        UTERINE       Current Meds:  Current Facility-Administered Medications   Medication Dose Route Frequency    levETIRAcetam (Keppra) 500 mg/5mL injection 500 mg  500 mg Intravenous Q12H    midazolam (Versed) 2 MG/2ML injection 2 mg  2 mg Intravenous Q15 Min PRN    traMADol (Ultram) tab 50 mg  50 mg Oral Q6H PRN    potassium chloride 40 mEq in 250mL sodium chloride 0.9% IVPB premix  40 mEq Intravenous Once    sodium chloride 0.9% infusion   Intravenous Continuous    acetaminophen (Tylenol) tab 650 mg  650 mg Oral Q4H PRN    Or    acetaminophen (Tylenol) rectal suppository 650 mg  650 mg Rectal Q4H PRN    labetalol (Trandate) 5 mg/mL injection 10 mg  10 mg Intravenous Q10 Min PRN    hydrALAzine (Apresoline) 20 mg/mL injection 10 mg  10 mg Intravenous Q2H PRN    ondansetron (Zofran) 4 MG/2ML injection 4 mg  4 mg Intravenous Q6H PRN    Or    metoclopramide (Reglan) 5 mg/mL injection 10 mg  10 mg Intravenous Q8H PRN    melatonin tab 3 mg  3 mg Oral Nightly PRN    acetaminophen (Tylenol Extra Strength) tab 500 mg  500 mg Oral Q4H PRN    polyethylene glycol (PEG 3350) (Miralax) 17 g oral packet 17 g  17 g Oral Daily PRN    sennosides (Senokot) tab 17.2 mg  17.2 mg Oral Nightly PRN    bisacodyl (Dulcolax) 10 MG rectal suppository 10 mg  10 mg Rectal Daily PRN    fleet enema (Fleet) 7-19 GM/118ML rectal enema 133 mL  1 enema  Rectal Once PRN       Review of Systems:     Constitutional:    Denies unusual weight loss or weight gain, fever/chills or night sweats.  HEENT:                Denies changes in vision or difficulty swallowing.  Pulm:                    Denies dyspnea, cough, or sputum production  Cardiac:               Denies chest pain, palpitations or lower extremity edema.  GI:                         Denies constipation, heartburn or melena.  :                       Denies dysuria or hematuria.  Skin:                     Denies rashes or open areas.  Neuro:                  As per HPI    All other systems were reviewed and are negative.    Vitals:   Temp:  [97.2 °F (36.2 °C)-98.7 °F (37.1 °C)] 97.3 °F (36.3 °C)  Pulse:  [] 78  Resp:  [11-18] 13  BP: (111-165)/(67-94) 138/93  SpO2:  [96 %-100 %] 100 %  Body mass index is 21.24 kg/m².    Intake/Output:    Intake/Output Summary (Last 24 hours) at 3/21/2024 0849  Last data filed at 3/21/2024 0543  Gross per 24 hour   Intake 769 ml   Output 400 ml   Net 369 ml       Physical Examination:   General- No acute distress  CV- RRR  Resp- CTA Bilat  Neuro-  Mental status- awake and alert, regards and follows most commands, severe expressive aphasia  Cranial nerves- pupils equally round and reactive to light, extraocular muscles intact, +R facial droop  Motor- 5/5 on L, 0/5 RUE, 2/5 in RLE  Sens-  Dec to light touch on R    Diagnostics:   CT BRAIN OR HEAD (83183)    Result Date: 3/21/2024  CONCLUSION:  4.5 x 3.7 x 3.4 cm acute intraparenchymal hematoma arising from the posterior left frontal lobe with localized mass effect and a small amount of surrounding vasogenic edema and mild midline shift to the right of 3 mm.  No significant change.      LOCATION:  [facility]   Dictated by (CST): Charis Barnes MD on 3/21/2024 at 6:25 AM     Finalized by (CST): Charis Barnes MD on 3/21/2024 at 6:36 AM        Lab Review     Recent Labs   Lab 03/19/24  2444 03/21/24  0437    140   K 4.0 3.5     113*   CO2 26.0 24.0   * 116*   BUN 14 16   CREATSERUM 0.82 0.59     Recent Labs   Lab 03/19/24  2354 03/21/24  0437   WBC 9.2 7.6   HGB 13.0 12.4   .0 292.0       Assesment/Plan:     Neuro:  L frontal ich-   Differential includes hypertensive vs vasc malformation vs mass vs hemorrhagic conversion of ischemic stroke vs amyloid.   CTA c/f underlying vasc malformation, thus plan for angio per CYNTHIA and mri brain w/wo for further eval.   Cont neurochecks/pt/ot/st.  Cont keppra for seizure prophylaxis.  Cardiac:  Hl- sbp goal 100-150  Pulmonary:  Stable on RA  Renal:  IVFs, monitor BUN/Cr  GI:  PO as tolerated  Heme/ID:  Afebrile, no leukocytosis  Endocrine/Rheum:  Monitor glucose, sliding scale insulin prn  DVT Prophylaxis:  SCD’s    Goals of the Day: neurochecks, angio, mri   A total of 80 minutes of critical care time (exclusive of billable procedures) was administered to manage and/or prevent neurologic instability. This involved direct patient intervention, complex decision making, and/or extensive discussions with the patient, family, and clinical staff.    Thank you for allowing me to participate in the care of this patient.     Nely Graham MD, [facility]  Medical Director of System Neurosciences  Chief, Section of Neurocritical Care  [facility]

## 2024-03-21 NOTE — PLAN OF CARE
Received patient at 0730. Pt initially nodding appropriately to yes/no questions. Moves L side 4/5. RUE contracts with weak shoulder shrug. RLE gross movement. Pt left for NI lab ~0845 and returned ~1123 For 1400 assessment patient not following commands consistently. Dr. Graham notified. STAT head CT done. EEG ordered. Neuros Q1h with pupillometer. Stable on room air. SBP goal <150. NPO for now given neuro status. IVF infusing. Voiding per brigitte. Bedrest; turn Q2hrs. Discussed plan of care with patient and patient's spouse/children. See doc flow sheet for vital signs and assessments.

## 2024-03-21 NOTE — CONSULTS
[facility]  Interventional Neuroradiology  Consultation Note    Leanne Ching Patient Status:  Inpatient    1960 MRN [id number]   Location [facility] 6NE-A Attending Spenser, Mendel Berg, *   Hosp Day # 1 PCP Shelby Frost MD     REASON FOR CONSULTATION: L frontal ICH    HISTORY OF PRESENT ILLNESS: Leanne Ching is a 64 y/o female with PMH hyperlipidemia who initially presented to [facility] on 3/20 with right arm weakness and speech disturbances. CT demonstrated a L frontal ICH. CTA reported a questionable vascular malformation. She was then transferred to [facility] for consultation with our CYNTHIA services.     Stable head CT this am. Family at bedside. Pt with severe expressive aphasia, nonverbal. Nods head \"yes\" or \"no\" appropriately to questioning. She denies any current pain, including head/neck. Also denies any current visual disturbances.     PAST MEDICAL HISTORY:  Past Medical History:   Diagnosis Date    Diverticulosis large intestine w/o perforation or abscess w/o bleeding 2018    High cholesterol     Hyperlipidemia     Hypertension 3/21/2024    Internal hemorrhoids 2018       PAST SURGICAL HISTORY:  Past Surgical History:   Procedure Laterality Date    ABLATION      COLONOSCOPY N/A 2018    Procedure: COLONOSCOPY;  Surgeon: Jacinda Cruz MD;  Location: [facility] ENDOSCOPY          x 2       FAMILY HISTORY:  family history includes Cancer (age of onset: 70) in her paternal grandmother.    SOCIAL HISTORY:   reports that she has never smoked. She has never used smokeless tobacco. She reports that she does not drink alcohol and does not use drugs.    ALLERGIES:  No Known Allergies    MEDICATIONS:  No current outpatient medications on file.     Current Facility-Administered Medications   Medication Dose Route Frequency    levETIRAcetam (Keppra) 500 mg/5mL injection 500 mg  500 mg Intravenous Q12H    midazolam (Versed) 2 MG/2ML injection 2 mg  2 mg Intravenous  Q15 Min PRN    traMADol (Ultram) tab 50 mg  50 mg Oral Q6H PRN    potassium chloride 40 mEq in 250mL sodium chloride 0.9% IVPB premix  40 mEq Intravenous Once    sodium chloride 0.9% infusion   Intravenous Continuous    acetaminophen (Tylenol) tab 650 mg  650 mg Oral Q4H PRN    Or    acetaminophen (Tylenol) rectal suppository 650 mg  650 mg Rectal Q4H PRN    labetalol (Trandate) 5 mg/mL injection 10 mg  10 mg Intravenous Q10 Min PRN    hydrALAzine (Apresoline) 20 mg/mL injection 10 mg  10 mg Intravenous Q2H PRN    ondansetron (Zofran) 4 MG/2ML injection 4 mg  4 mg Intravenous Q6H PRN    Or    metoclopramide (Reglan) 5 mg/mL injection 10 mg  10 mg Intravenous Q8H PRN    melatonin tab 3 mg  3 mg Oral Nightly PRN    acetaminophen (Tylenol Extra Strength) tab 500 mg  500 mg Oral Q4H PRN    polyethylene glycol (PEG 3350) (Miralax) 17 g oral packet 17 g  17 g Oral Daily PRN    sennosides (Senokot) tab 17.2 mg  17.2 mg Oral Nightly PRN    bisacodyl (Dulcolax) 10 MG rectal suppository 10 mg  10 mg Rectal Daily PRN    fleet enema (Fleet) 7-19 GM/118ML rectal enema 133 mL  1 enema Rectal Once PRN       REVIEW OF SYSTEMS:  A 10-point system was reviewed.  Pertinent positives and negatives are noted in HPI.      PHYSICAL EXAMINATION:  VITAL SIGNS: BP (!) 132/91   Pulse 84   Temp 97.2 °F (36.2 °C) (Temporal)   Resp 11   Wt 135 lb 9.3 oz (61.5 kg)   SpO2 100%   BMI 21.24 kg/m²   GENERAL:  Patient is a 63 year old female in no acute distress.    NEUROLOGICAL:    Mental status: drowsy, but easily arousable to verbal stimuli, severe expressive aphasia noted, nods \"yes\" or \"no\" appropriately to questioning   Speech: nonverbal  Cranial Nerves: PERRLA, EOMI, no nystagmus, facial sensation intact, face symmetric, tongue midline, shoulder shrug equal, remainder CN intact  Motor: RUE 1/5, LUE 4/5, LLE 4/5, RLE 3/5   Sensory: decreased on the R  Gait: Deferred         DIAGNOSTIC DATA:  Lab Results   Component Value Date    WBC 7.6  03/21/2024    HGB 12.4 03/21/2024    HCT 37.8 03/21/2024    .0 03/21/2024    CREATSERUM 0.59 03/21/2024    BUN 16 03/21/2024     03/21/2024    K 3.5 03/21/2024     03/21/2024    CO2 24.0 03/21/2024     03/21/2024    CA 8.7 03/21/2024              IMAGING:    3/21/2024 CT brain   CONCLUSION:  4.5 x 3.7 x 3.4 cm acute intraparenchymal hematoma arising from the posterior left frontal lobe with localized mass effect and a small amount of surrounding vasogenic edema and mild midline shift to the right of 3 mm.  No significant change.       3/20/2024 CT brain (0643)  CONCLUSION:   1. Large acute 4.5 x 3.5 cm intraparenchymal hematoma centered at the periphery of the left frontal lobe.  Although the hematoma largely appears similar in size and morphology since examination from approximately 6.5 hours prior, there is a small curvilinear band of acute hemorrhagic components along the posterior margin of the hematoma, which are new since prior.  Mild surrounding vasogenic edema appears unchanged.  Stable left cerebral hemispheric mass effect with mild 2-3 mm left-to-right midline shift.  Continued close follow-up to resolution is advised.   2. Intracranial atherosclerosis.     3/20/2024 CT brain (0005)  CONCLUSION:      Large 4.8 centimeter left frontal intraparenchymal hematoma with moderate local mass effect, as well as 3 millimeters left-to-right midline shift.  Neurosurgical consultation is recommended. Follow-up CT brain is recommended.  Differential considerations include underlying vascular etiologies such as AVM, a hemorrhagic mass, or other etiologies.        3/20/2024 CT Stroke Brain, CTA head + neck   CONCLUSION:      Large left frontal intraparenchymal hematoma again noted.  There is associated internal serpiginous enhancement, which may represent an abnormal tangle of vessels.  Finding raises the possibility of an underlying vascular malformation.  Neuro surgical consultation has already  been placed at this time. Further assessment with diagnostic interventional angiography and/or contrast enhanced MRI brain would be helpful.      Local mass effect from left frontal intraparenchymal hematoma as well as left to right 5 millimeter midline shift again noted.      No flow-limiting stenosis within the major arteries of the neck.  Approximately 36% narrowing of the left carotid bulb by atherosclerotic plaque.  No significant narrowing of the right carotid bulb.      No high-grade flow-limiting stenosis within the major arteries of the [id number] intracranially.     ASSESSMENT/PLAN:    L frontal ICH  Plan for DCA this am with Dr. Philip  Remain NPO  Consent signed by , children at bedside; indication, risks/benefits, potential complications explained to pt and family in detail     Dr. Philip aware of the above.     ALISON Wells  [facility]  3/21/2024, 7:56 AM  Spectre [id number]    Total critical care time spent: 60 mins

## 2024-03-21 NOTE — PLAN OF CARE
Pt admitted from [facility]. Received pt at 2000. Patient alert and following command. Expressive aphasia. Nods appropriately. R side hemiplegia. Placed on tele-NSR. On RA. Within sbp parameters 100-150. Neuro checks Q1H, see flowsheet. NPO at midnight, plan for angiogram. Purewick in place. IVF. Remains on bedrest. Family at bedside. Admission navigator completed. CT this AM. Call light wtihin reach. All needs met at this time.

## 2024-03-22 ENCOUNTER — APPOINTMENT (OUTPATIENT)
Dept: GENERAL RADIOLOGY | Facility: HOSPITAL | Age: 64
End: 2024-03-22
Attending: NURSE PRACTITIONER
Payer: MEDICARE

## 2024-03-22 ENCOUNTER — APPOINTMENT (OUTPATIENT)
Dept: CT IMAGING | Facility: HOSPITAL | Age: 64
End: 2024-03-22
Attending: NEUROLOGICAL SURGERY
Payer: MEDICARE

## 2024-03-22 PROBLEM — T14.8XXA HEMATOMA: Status: ACTIVE | Noted: 2024-03-22

## 2024-03-22 LAB
ANION GAP SERPL CALC-SCNC: 4 MMOL/L (ref 0–18)
BASE EXCESS BLD CALC-SCNC: -7 MMOL/L
BASE EXCESS BLDA CALC-SCNC: -4.9 MMOL/L (ref ?–2)
BODY TEMPERATURE: 98.6 F
BUN BLD-MCNC: 14 MG/DL (ref 9–23)
CA-I BLD-SCNC: 1.16 MMOL/L (ref 1.12–1.32)
CALCIUM BLD-MCNC: 8.1 MG/DL (ref 8.5–10.1)
CHLORIDE SERPL-SCNC: 116 MMOL/L (ref 98–112)
CO2 BLD-SCNC: 19 MMOL/L (ref 22–32)
CO2 SERPL-SCNC: 21 MMOL/L (ref 21–32)
COHGB MFR BLD: 0 % SAT (ref 0–3)
CREAT BLD-MCNC: 0.49 MG/DL
EGFRCR SERPLBLD CKD-EPI 2021: 105 ML/MIN/1.73M2 (ref 60–?)
ERYTHROCYTE [DISTWIDTH] IN BLOOD BY AUTOMATED COUNT: 14.4 %
FIO2: 40 %
GLUCOSE BLD-MCNC: 123 MG/DL (ref 70–99)
GLUCOSE BLD-MCNC: 131 MG/DL (ref 70–99)
GLUCOSE BLD-MCNC: 144 MG/DL (ref 70–99)
GLUCOSE BLD-MCNC: 146 MG/DL (ref 70–99)
GLUCOSE BLD-MCNC: 149 MG/DL (ref 70–99)
GLUCOSE BLD-MCNC: 163 MG/DL (ref 70–99)
GLUCOSE BLD-MCNC: 164 MG/DL (ref 70–99)
HCO3 BLD-SCNC: 18.1 MEQ/L
HCO3 BLDA-SCNC: 21.1 MEQ/L (ref 21–27)
HCT VFR BLD AUTO: 35.1 %
HCT VFR BLD CALC: 32 %
HGB BLD-MCNC: 11.5 G/DL
HGB BLD-MCNC: 13.7 G/DL
MCH RBC QN AUTO: 29.1 PG (ref 26–34)
MCHC RBC AUTO-ENTMCNC: 32.8 G/DL (ref 31–37)
MCV RBC AUTO: 88.9 FL
METHGB MFR BLD: 0 % SAT (ref 0.4–1.5)
OSMOLALITY SERPL CALC.SUM OF ELEC: 296 MOSM/KG (ref 275–295)
OXYHGB MFR BLDA: 97.9 % (ref 92–100)
PCO2 BLD: 30.9 MMHG
PCO2 BLDA: 35 MM HG (ref 35–45)
PEEP: 5 CM H2O
PH BLD: 7.38 [PH]
PH BLDA: 7.36 [PH] (ref 7.35–7.45)
PLATELET # BLD AUTO: 290 10(3)UL (ref 150–450)
PO2 BLD: 293 MMHG
PO2 BLDA: 156 MM HG (ref 80–100)
POTASSIUM BLD-SCNC: 3.6 MMOL/L (ref 3.6–5.1)
POTASSIUM SERPL-SCNC: 3.3 MMOL/L (ref 3.5–5.1)
RBC # BLD AUTO: 3.95 X10(6)UL
SAO2 % BLD: 100 %
SODIUM BLD-SCNC: 140 MMOL/L (ref 136–145)
SODIUM SERPL-SCNC: 141 MMOL/L (ref 136–145)
TIDAL VOLUME: 400 ML
TRIGL SERPL-MCNC: 149 MG/DL (ref 30–149)
VENT RATE: 16 /MIN
WBC # BLD AUTO: 10 X10(3) UL (ref 4–11)

## 2024-03-22 PROCEDURE — 71045 X-RAY EXAM CHEST 1 VIEW: CPT | Performed by: NURSE PRACTITIONER

## 2024-03-22 PROCEDURE — 70450 CT HEAD/BRAIN W/O DYE: CPT | Performed by: NEUROLOGICAL SURGERY

## 2024-03-22 PROCEDURE — 99232 SBSQ HOSP IP/OBS MODERATE 35: CPT | Performed by: INTERNAL MEDICINE

## 2024-03-22 PROCEDURE — 99291 CRITICAL CARE FIRST HOUR: CPT | Performed by: INTERNAL MEDICINE

## 2024-03-22 PROCEDURE — 99292 CRITICAL CARE ADDL 30 MIN: CPT | Performed by: INTERNAL MEDICINE

## 2024-03-22 RX ORDER — ACETAMINOPHEN 325 MG/1
650 TABLET ORAL EVERY 4 HOURS PRN
Status: DISCONTINUED | OUTPATIENT
Start: 2024-03-22 | End: 2024-03-23 | Stop reason: ALTCHOICE

## 2024-03-22 RX ORDER — METOCLOPRAMIDE HYDROCHLORIDE 5 MG/ML
10 INJECTION INTRAMUSCULAR; INTRAVENOUS EVERY 8 HOURS PRN
Status: DISCONTINUED | OUTPATIENT
Start: 2024-03-22 | End: 2024-03-23

## 2024-03-22 RX ORDER — ACETAMINOPHEN 650 MG/1
650 SUPPOSITORY RECTAL EVERY 4 HOURS PRN
Status: DISCONTINUED | OUTPATIENT
Start: 2024-03-22 | End: 2024-03-23 | Stop reason: ALTCHOICE

## 2024-03-22 RX ORDER — NALOXONE HYDROCHLORIDE 0.4 MG/ML
0.08 INJECTION, SOLUTION INTRAMUSCULAR; INTRAVENOUS; SUBCUTANEOUS AS NEEDED
Status: DISCONTINUED | OUTPATIENT
Start: 2024-03-22 | End: 2024-03-22

## 2024-03-22 RX ORDER — NICOTINE POLACRILEX 4 MG
15 LOZENGE BUCCAL
Status: DISCONTINUED | OUTPATIENT
Start: 2024-03-22 | End: 2024-03-29

## 2024-03-22 RX ORDER — SENNOSIDES 8.8 MG/5ML
10 LIQUID ORAL NIGHTLY PRN
Status: DISCONTINUED | OUTPATIENT
Start: 2024-03-22 | End: 2024-03-29

## 2024-03-22 RX ORDER — ONDANSETRON 2 MG/ML
4 INJECTION INTRAMUSCULAR; INTRAVENOUS EVERY 6 HOURS PRN
Status: DISCONTINUED | OUTPATIENT
Start: 2024-03-22 | End: 2024-03-22

## 2024-03-22 RX ORDER — ACETAMINOPHEN 10 MG/ML
1000 INJECTION, SOLUTION INTRAVENOUS EVERY 6 HOURS PRN
Status: DISCONTINUED | OUTPATIENT
Start: 2024-03-22 | End: 2024-03-23 | Stop reason: ALTCHOICE

## 2024-03-22 RX ORDER — HYDROMORPHONE HYDROCHLORIDE 1 MG/ML
0.2 INJECTION, SOLUTION INTRAMUSCULAR; INTRAVENOUS; SUBCUTANEOUS EVERY 5 MIN PRN
Status: DISCONTINUED | OUTPATIENT
Start: 2024-03-22 | End: 2024-03-22

## 2024-03-22 RX ORDER — LABETALOL HYDROCHLORIDE 5 MG/ML
10 INJECTION, SOLUTION INTRAVENOUS EVERY 4 HOURS PRN
Status: DISCONTINUED | OUTPATIENT
Start: 2024-03-22 | End: 2024-03-29

## 2024-03-22 RX ORDER — HYDROMORPHONE HYDROCHLORIDE 1 MG/ML
0.4 INJECTION, SOLUTION INTRAMUSCULAR; INTRAVENOUS; SUBCUTANEOUS EVERY 5 MIN PRN
Status: DISCONTINUED | OUTPATIENT
Start: 2024-03-22 | End: 2024-03-22

## 2024-03-22 RX ORDER — CHLORHEXIDINE GLUCONATE ORAL RINSE 1.2 MG/ML
15 SOLUTION DENTAL
Status: DISCONTINUED | OUTPATIENT
Start: 2024-03-22 | End: 2024-03-23 | Stop reason: ALTCHOICE

## 2024-03-22 RX ORDER — HYDROMORPHONE HYDROCHLORIDE 1 MG/ML
0.6 INJECTION, SOLUTION INTRAMUSCULAR; INTRAVENOUS; SUBCUTANEOUS EVERY 5 MIN PRN
Status: DISCONTINUED | OUTPATIENT
Start: 2024-03-22 | End: 2024-03-22

## 2024-03-22 RX ORDER — ACETAMINOPHEN 160 MG/5ML
650 SOLUTION ORAL EVERY 4 HOURS PRN
Status: DISCONTINUED | OUTPATIENT
Start: 2024-03-22 | End: 2024-03-23 | Stop reason: ALTCHOICE

## 2024-03-22 RX ORDER — NICOTINE POLACRILEX 4 MG
30 LOZENGE BUCCAL
Status: DISCONTINUED | OUTPATIENT
Start: 2024-03-22 | End: 2024-03-29

## 2024-03-22 RX ORDER — DEXTROSE MONOHYDRATE 25 G/50ML
50 INJECTION, SOLUTION INTRAVENOUS
Status: DISCONTINUED | OUTPATIENT
Start: 2024-03-22 | End: 2024-03-29

## 2024-03-22 NOTE — PROGRESS NOTES
[facility]   part of [facility]     Hospitalist Progress Note     Leanne Ching Patient Status:  Inpatient    1960 MRN [id number]   Location [facility] 6NE-A Attending Adelina Morris MD   Hosp Day # 2 PCP Shelby Frost MD     Chief Complaint: ICH    Subjective:     Patient intubated but awake and following simple commands. Not moving her right side.    Objective:    Review of Systems:   A comprehensive review of systems was completed; pertinent positive and negatives stated in subjective.    Vital signs:  Temp:  [96.9 °F (36.1 °C)-99 °F (37.2 °C)] 96.9 °F (36.1 °C)  Pulse:  [] 84  Resp:  [12-23] 16  BP: (104-157)/(60-95) 115/71  SpO2:  [97 %-100 %] 100 %  AO: ()/(50-70) 122/58  FiO2 (%):  [35 %-40 %] 35 %    Physical Exam:    General: No acute distress  Respiratory: No wheezes, no rhonchi  Cardiovascular: S1, S2, regular rate and rhythm  Abdomen: Soft, Non-tender, non-distended, positive bowel sounds  Neuro: unable to move right side. PERRL  Extremities: No edema      Diagnostic Data:    Labs:  Recent Labs   Lab 247 24  0406   WBC 9.2 7.6 10.0   HGB 13.0 12.4 11.5*   MCV 90.0 89.2 88.9   .0 292.0 290.0   INR 0.89  --   --        Recent Labs   Lab 247 24  0406   * 116* 164*   BUN 14 16 14   CREATSERUM 0.82 0.59 0.49*   CA 10.2 8.7 8.1*   ALB 4.9*  --   --     140 141   K 4.0 3.5 3.3*    113* 116*   CO2 26.0 24.0 21.0   ALKPHO 66  --   --    AST 25  --   --    ALT 15  --   --    BILT 0.5  --   --    TP 7.9  --   --        Estimated Creatinine Clearance: 103 mL/min (A) (based on SCr of 0.49 mg/dL (L)).    No results for input(s): \"TROP\", \"TROPHS\", \"CK\" in the last 168 hours.    Recent Labs   Lab 24  2354   PTP 12.5   INR 0.89              icrobiology    No results found for this visit on 24.      Imaging: Reviewed in Epic.    Medications:    chlorhexidine gluconate  15 mL  Mouth/Throat BID@0800,2000    pantoprazole  40 mg Intravenous QAM AC    insulin aspart  1-5 Units Subcutaneous 4 times per day    levETIRAcetam  1,000 mg Intravenous Q12H       Assessment & Plan:      # Left frontal intraparenchymal hematoma sp craniotomy 3/21/24  -Keppra for seizure ppx  - Close monitoring in neuro ICU  -Cerebral angiogram without AV malformation or fistula  -MRI brain ordered  - neurosurgery on consult  -continue ICH protocol, BP goals per neuroCC      # Vasogenic edema  - mild     # Prediabetes  - Will monitor blood sugars for now  -HgbA1c 6.0.     D/w spouse, children at bedside.     Adelina Morris MD    Supplementary Documentation:     Quality:  DVT Mechanical Prophylaxis:   SCDs,    DVT Pharmacologic Prophylaxis   Medication   None                Code Status: Full Code  Miles: Miles catheter in place  Miles Duration (in days): 2  Central line:    LISA:     Discharge is dependent on: course  At this point Ms. Ching is expected to be discharge to: rehab     The 21st Century Cures Act makes medical notes like these available to patients in the interest of transparency. Please be advised this is a medical document. Medical documents are intended to carry relevant information, facts as evident, and the clinical opinion of the practitioner. The medical note is intended as peer to peer communication and may appear blunt or direct. It is written in medical language and may contain abbreviations or verbiage that are unfamiliar.

## 2024-03-22 NOTE — CONSULTS
[facility] Pulmonary Consult Note       Leanne Ching Patient Status:  Inpatient    1960 MRN [id number]   Location [facility] 6NE-A Attending Adelina Morris MD   Hosp Day # 2 PCP Shelby Frost MD     Reason for Consultation:  Ventilator assistance    History of Present Illness:  Leanne Ching is a a(n) 63 year old female who was found to have a left frontal ICH after having sudden onset right-sided weakness and slurred speech.  She presented to [facility] and was found to have a left frontal ICH with mass effect and midline shift.  She underwent left craniotomy with hematoma evacuation with neurosurgery overnight.    History:  Past Medical History:   Diagnosis Date    Diverticulosis large intestine w/o perforation or abscess w/o bleeding 2018    High cholesterol     Hyperlipidemia     Hypertension 3/21/2024    Internal hemorrhoids 2018     Past Surgical History:   Procedure Laterality Date    ABLATION      COLONOSCOPY N/A 2018    Procedure: COLONOSCOPY;  Surgeon: Jacinda Cruz MD;  Location: [facility] ENDOSCOPY          x 2     Family History   Problem Relation Age of Onset    Cancer Paternal Grandmother 70        UTERINE      reports that she has never smoked. She has never used smokeless tobacco. She reports that she does not drink alcohol and does not use drugs.    Allergies:  No Known Allergies    Medications:    Current Facility-Administered Medications:     glucose (Dex4) 15 GM/59ML oral liquid 15 g, 15 g, Oral, Q15 Min PRN **OR** glucose (Glutose) 40% oral gel 15 g, 15 g, Oral, Q15 Min PRN **OR** glucose-vitamin C (Dex-4) chewable tab 4 tablet, 4 tablet, Oral, Q15 Min PRN **OR** dextrose 50% injection 50 mL, 50 mL, Intravenous, Q15 Min PRN **OR** glucose (Dex4) 15 GM/59ML oral liquid 30 g, 30 g, Oral, Q15 Min PRN **OR** glucose (Glutose) 40% oral gel 30 g, 30 g, Oral, Q15 Min PRN **OR** glucose-vitamin C (Dex-4) chewable tab 8 tablet, 8 tablet, Oral, Q15  Min PRN    metoclopramide (Reglan) 5 mg/mL injection 10 mg, 10 mg, Intravenous, Q8H PRN    fentaNYL (Sublimaze) 50 mcg/mL injection 25 mcg, 25 mcg, Intravenous, Q30 Min PRN **OR** fentaNYL (Sublimaze) 50 mcg/mL injection 50 mcg, 50 mcg, Intravenous, Q30 Min PRN    acetaminophen (Tylenol) tab 650 mg, 650 mg, Oral, Q4H PRN **OR** acetaminophen (Tylenol) 160 MG/5ML oral liquid 650 mg, 650 mg, Oral, Q4H PRN **OR** acetaminophen (Tylenol) rectal suppository 650 mg, 650 mg, Rectal, Q4H PRN **OR** acetaminophen (Ofirmev) 10 mg/mL infusion premix 1,000 mg, 1,000 mg, Intravenous, Q6H PRN    fentaNYL (Sublimaze) 25 mcg BOLUS FROM BAG infusion, 25 mcg, Intravenous, Q30 Min PRN **OR** fentaNYL (Sublimaze) 50 mcg BOLUS FROM BAG infusion, 50 mcg, Intravenous, Q30 Min PRN    fentaNYL in sodium chloride 0.9% (Sublimaze) 1000 mcg/100mL infusion premix,  mcg/hr, Intravenous, Continuous PRN    senna (Senokot) 8.8 MG/5ML oral syrup 17.6 mg, 10 mL, Oral, Nightly PRN    chlorhexidine gluconate (Peridex) 0.12 % oral solution 15 mL, 15 mL, Mouth/Throat, BID@0800,2000    propofol (Diprivan) 10 mg/mL infusion premix, 5-50 mcg/kg/min (Dosing Weight), Intravenous, Continuous    pantoprazole (Protonix) 40 mg in sodium chloride 0.9% PF 10 mL IV push, 40 mg, Intravenous, QAM AC    labetalol (Trandate) 5 mg/mL injection 10 mg, 10 mg, Intravenous, Q4H PRN    insulin aspart (NovoLOG) 100 Units/mL FlexPen 1-5 Units, 1-5 Units, Subcutaneous, 4 times per day    midazolam (Versed) 2 MG/2ML injection 2 mg, 2 mg, Intravenous, Q15 Min PRN    traMADol (Ultram) tab 50 mg, 50 mg, Oral, Q6H PRN    niCARdipine in sodium chloride 0.86% (carDENE) 20 mg/200mL infusion premix, 5-15 mg/hr, Intravenous, Continuous PRN    [COMPLETED] levETIRAcetam (Keppra) 3,750 mg in sodium chloride 0.9% 100 mL IVPB, 60 mg/kg, Intravenous, Once **AND** levETIRAcetam (Keppra) 500 mg/5mL injection 1,000 mg, 1,000 mg, Intravenous, Q12H    sodium chloride 0.9% infusion, ,  Intravenous, Continuous    hydrALAzine (Apresoline) 20 mg/mL injection 10 mg, 10 mg, Intravenous, Q2H PRN    ondansetron (Zofran) 4 MG/2ML injection 4 mg, 4 mg, Intravenous, Q6H PRN **OR** metoclopramide (Reglan) 5 mg/mL injection 10 mg, 10 mg, Intravenous, Q8H PRN    melatonin tab 3 mg, 3 mg, Oral, Nightly PRN    polyethylene glycol (PEG 3350) (Miralax) 17 g oral packet 17 g, 17 g, Oral, Daily PRN    sennosides (Senokot) tab 17.2 mg, 17.2 mg, Oral, Nightly PRN    bisacodyl (Dulcolax) 10 MG rectal suppository 10 mg, 10 mg, Rectal, Daily PRN    fleet enema (Fleet) 7-19 GM/118ML rectal enema 133 mL, 1 enema, Rectal, Once PRN    Review of Systems:   All other review of systems are negative.    Vital signs in last 24 hours:  Patient Vitals for the past 24 hrs:   BP Temp Temp src Pulse Resp SpO2 Height   03/22/24 0900 115/71 -- -- 84 16 100 % --   03/22/24 0800 116/69 96.9 °F (36.1 °C) Temporal 84 16 100 % --   03/22/24 0738 -- -- -- 85 16 100 % --   03/22/24 0700 113/64 -- -- 85 16 100 % --   03/22/24 0630 111/64 -- -- -- -- -- --   03/22/24 0600 126/74 -- -- 96 19 100 % --   03/22/24 0500 112/67 -- -- 87 16 100 % --   03/22/24 0431 116/84 -- -- 100 20 100 % --   03/22/24 0400 116/67 97.1 °F (36.2 °C) Temporal 84 16 100 % --   03/22/24 0300 117/71 -- -- 87 16 100 % --   03/22/24 0230 112/67 -- -- 87 16 100 % --   03/22/24 0200 120/70 -- -- 88 16 100 % --   03/22/24 0130 114/67 -- -- 85 16 100 % --   03/22/24 0115 114/69 -- -- 86 16 100 % --   03/22/24 0100 115/67 -- -- 85 16 100 % --   03/22/24 0045 (!) 138/95 -- -- 89 23 100 % --   03/22/24 0030 120/72 -- -- 80 16 100 % --   03/22/24 0020 119/60 -- -- 79 18 100 % --   03/22/24 0018 -- 97 °F (36.1 °C) Temporal -- -- -- --   03/21/24 2000 140/74 97.7 °F (36.5 °C) Temporal 76 16 98 % --   03/21/24 1915 138/78 -- -- -- -- -- --   03/21/24 1900 152/81 -- -- 75 16 98 % --   03/21/24 1800 144/75 -- -- 97 16 100 % --   03/21/24 1700 146/72 -- -- 73 16 100 % --   03/21/24 1615  130/76 -- -- 90 12 97 % --   03/21/24 1600 157/71 99 °F (37.2 °C) Temporal 78 16 98 % --   03/21/24 1515 147/70 -- -- 72 17 100 % --   03/21/24 1500 153/80 -- -- 94 19 99 % --   03/21/24 1400 133/76 -- -- 70 16 98 % --   03/21/24 1330 134/82 -- -- 79 15 100 % --   03/21/24 1300 143/67 -- -- 70 16 99 % --   03/21/24 1230 129/82 -- -- 68 16 100 % --   03/21/24 1215 131/81 -- -- 65 15 99 % --   03/21/24 1200 121/78 97.9 °F (36.6 °C) Temporal 66 14 98 % --   03/21/24 1145 (!) 104/92 -- -- 84 23 98 % --   03/21/24 1135 127/77 -- -- 67 15 98 % --   03/21/24 1123 137/76 97.2 °F (36.2 °C) Temporal 68 14 97 % --   03/21/24 1000 -- -- -- -- -- -- 5' 5\" (1.651 m)       Intake/Output:    Intake/Output Summary (Last 24 hours) at 3/22/2024 0918  Last data filed at 3/22/2024 0800  Gross per 24 hour   Intake 3859.5 ml   Output 1925 ml   Net 1934.5 ml       Physical Exam:   General: Intubated, sedated, no acute distress   Head: Normocephalic, head bandaged, this was not examined   Eyes: Conjunctivae/corneas clear.  No scleral icterus.  No conjunctival     hemorrhage.   Nose: Nares normal.   Throat: ET tube in place   Neck: Soft, supple neck; trachea midline, no adenopathy, no thyromegaly.   Lungs: coarse breath sounds bilaterally   Chest wall: No tenderness or deformity.   Heart: Regular rate and rhythm, normal S1S2, no murmur.   Abdomen: soft, non-tender, non-distended, no masses, no guarding, no     rebound, positive BS.   Extremity: no edema, no cyanosis   Skin: No rashes or lesions.   Neurological: Sedated, neurological exam was deferred    Lab Data Review:  Recent Labs   Lab 03/19/24  2354 03/21/24  0437 03/22/24  0406   WBC 9.2 7.6 10.0   HGB 13.0 12.4 11.5*   HCT 41.3 37.8 35.1   .0 292.0 290.0       Recent Labs   Lab 03/19/24 2354 03/21/24  0437 03/22/24  0406    140 141   K 4.0 3.5 3.3*    113* 116*   CO2 26.0 24.0 21.0   BUN 14 16 14   ALT 15  --   --    AST 25  --   --        No results for input(s):  \"MG\" in the last 168 hours.    No results found for: \"PHOS\", \"PHOSPHORUS\"     Recent Labs   Lab 03/19/24  2354   INR 0.89   PTT 26.5       Recent Labs   Lab 03/22/24  0136   ABGPHT 7.36   JFADUE2V 35   YXIGS8J 156*   ABGHCO3 21.1   ABGBE -4.9*   TEMP 98.6   MARCI Not Applicable   SITE Arterial Line   DEV  adult   THGB 13.7       Invalid input(s): \"CKTOTAL\", \"TROPONINI\", \"TROPONINT\", \"CKMBINDEX\"      Cultures:   No results found for this visit on 03/20/24.    Radiology:  CT BRAIN OR HEAD ([id number])  Narrative: PROCEDURE:  CT BRAIN OR HEAD ([id number])     COMPARISON:  [facility] , CT, CT BRAIN OR HEAD ([id number]), 3/22/2024, 0:06 AM.     INDICATIONS:  IPH     TECHNIQUE:  Noncontrast CT scanning is performed through the brain. Dose reduction techniques were used. Dose information is transmitted to the ACR (American College of Radiology) NRDR (National Radiology Data Registry) which includes the Dose Index   Registry.     PATIENT STATED HISTORY: (As transcribed by Technologist)  Post craniotomy for subdural hematoma evacuation.       FINDINGS:  Postoperative changes from left frontoparietal craniotomy with surgical clips along the left scalp are again noted.  There is partial redistribution of the previously visualized postprocedural pneumocephalus most pronounced along the anterior cerebral   convexities bilaterally.  Mixed attenuation extra-axial collection containing fluid, blood, and air along the left cerebral convexity measuring up to 13 mm in thickness, previously 16 mm.  Decreased left-to-right midline shift of approximately 3 mm,   previously 6 mm.      Redemonstration of changes related to partial evacuation of parenchymal hematoma in the left frontal parietal region the largest residual component again measures approximately 1.4 x 0.5 x 1.6 cm.  A 2nd stable small residual component measures   approximately 0.8 x 0.8 x 1.1 cm.  Small amount of edema and air attenuation noted along the operative bed.  Clinical  correlation and continued follow-up is suggested.      There is no hydrocephalus.  The basal cisterns are patent.  The gray-white matter differentiation is intact.      The visualized paranasal sinuses and mastoid air cells are unremarkable.                       Impression: CONCLUSION:       1. Postoperative changes from left frontoparietal craniotomy with surgical clips along the left scalp are again noted.  There is partial redistribution of the previously visualized postprocedural pneumocephalus most pronounced along the anterior cerebral   convexities bilaterally.  Mixed attenuation extra-axial collection containing fluid, blood, and air along the left cerebral convexity measuring up to 13 mm in thickness, previously 16 mm.  Decreased left-to-right midline shift of approximately 3 mm,   previously 6 mm.      2. Redemonstration of changes related to partial evacuation of parenchymal hematoma in the left frontal parietal region the largest residual component again measures approximately 1.4 x 0.5 x 1.6 cm.  A 2nd stable small residual component measures   approximately 0.8 x 0.8 x 1.1 cm.  Small amount of edema and air attenuation noted along the operative bed.  Clinical correlation and continued follow-up is suggested.      LOCATION:  [facility]        Dictated by (CST): Husam Martin MD on 3/22/2024 at 6:22 AM       Finalized by (CST): Husam Martin MD on 3/22/2024 at 6:28 AM     XR CHEST AP PORTABLE  (CPT=71045)  Narrative: PROCEDURE:  XR CHEST AP PORTABLE  (CPT=71045)     TECHNIQUE:  AP chest radiograph was obtained.     COMPARISON:  None.     INDICATIONS:  ETT and OGT placement check     PATIENT STATED HISTORY: (As transcribed by Technologist)           FINDINGS:  Endotracheal tube tip below thoracic inlet with the tip positioned 2.6 cm from the jakub.  OG tube tip in the stomach.  Borderline heart size with normal pulmonary vascularity.  No pleural effusion or pneumothorax.  No lobar consolidation.                    Impression: CONCLUSION:  Endotracheal tube tip below thoracic inlet with the tip positioned 2.6 cm from the jakub.  OG tube tip in the stomach     A preliminary report was provided by the [facility] teleradiology service.     LOCATION:  [facility]                 Dictated by (CST): Husam Martin MD on 3/22/2024 at 5:47 AM       Finalized by (CST): Husam Martin MD on 3/22/2024 at 5:48 AM     CT BRAIN OR HEAD ([id number])  Narrative: PROCEDURE:  CT BRAIN OR HEAD ([id number])     COMPARISON:  [facility] , CT, CT BRAIN OR HEAD ([id number]), 3/21/2024, 2:55 PM.     INDICATIONS:  Post-craniotomy; dual-energy required     TECHNIQUE:  Noncontrast CT scanning is performed through the brain. Dose reduction techniques were used. Dose information is transmitted to the ACR (American College of Radiology) NRDR (National Radiology Data Registry) which includes the Dose Index   Registry.     PATIENT STATED HISTORY: (As transcribed by Technologist)  Post craniotomy for subdural hematoma evacuation.      FINDINGS:  Interval changes of left frontoparietal craniotomy with surgical clips along the left scalp.  Moderate amount of postprocedural pneumocephalus most pronounced along left cerebral convexity.  Mixed attenuation extra-axial collection containing fluid and   air along the left cerebral convexity measuring up to 16 mm in thickness.  There is left-to-right midline shift of approximately 6 mm.      Interval partial evacuation of parenchymal hematoma in the left frontal parietal region the largest residual component measures approximately 1.4 x 0.5 x 1.6 cm, previously 4.0 x 3.2 x 5.0 cm.  A 2nd small residual component measures approximately 0.8 x   0.8 x 1.1 cm.  Small amount of edema and air attenuation noted along the operative bed.  Clinical correlation and continued follow-up is suggested.     There is no hydrocephalus.  The basal cisterns are patent.  The gray-white matter differentiation is intact.     The visualized paranasal  sinuses and mastoid air cells are unremarkable.                      Impression: CONCLUSION:       1. Interval changes of left frontoparietal craniotomy with surgical clips along the left scalp.  Moderate amount of postprocedural pneumocephalus most pronounced along left cerebral convexity.  Mixed attenuation extra-axial collection containing fluid   and air along the left cerebral convexity measuring up to 16 mm in thickness.  There is left-to-right midline shift of approximately 6 mm.      2. Interval partial evacuation of parenchymal hematoma in the left frontal parietal region the largest residual component measures approximately 1.4 x 0.5 x 1.6 cm, previously 4.0 x 3.2 x 5.0 cm.  A 2nd small residual component measures approximately 0.8   x 0.8 x 1.1 cm.  Small amount of edema and air attenuation noted along the operative bed.  Clinical correlation and continued follow-up is suggested.     A preliminary report was provided by the [facility]eradiology service.     LOCATION:  [id number]        Dictated by (CST): Husam Martin MD on 3/22/2024 at 4:59 AM       Finalized by (CST): Husam Martin MD on 3/22/2024 at 5:03 AM     US INTRAOPERATIVE GUIDANCE (CPT=76998)  Narrative: PROCEDURE:  US INTRAOPERATIVE GUIDANCE (CPT=76998)     COMPARISON:  None.     INDICATIONS:  craniotomy     TECHNIQUE:    Intraoperative ultrasound guidance was provided.  Selected images were obtained by the sonographer assisting in the procedure.     PATIENT STATED HISTORY: (As transcribed by Technologist)           Tech Time:  1 hour  Performing Doctor:  Dr. Romero   Area of Interest:  Brain     FINDINGS:  Sonographic imaging of the brain was provided during a craniotomy.                   Impression: CONCLUSION:  Please see the procedure/operative report for further details.           LOCATION:  [facility]           Dictated by (CST): Stromberg, LeRoy, MD on 3/22/2024 at 0:06 AM       Finalized by (CST): Stromberg, LeRoy, MD on 3/22/2024 at  0:07 AM         Assessment:  Acute respiratory failure in the setting of left frontal intraparenchymal hematoma status post evacuation  Left frontal intraparenchymal hematoma status post craniotomy on 3/21        Plan:  Patient is on essentially normal ventilator settings  Postop management per neurosurgery   Patient has to lay flat per their recommendations  When she can sit up, would proceed with spontaneous breathing trial and awakened to extubate  Keppra for seizure prophylaxis  Protonix for GI prophylaxis  Holding heparin due to hematoma, resume when ok with neurosurgery  Family updated at bedside    Critical care time: 45 minutes excluding procedures.  The patient is critically ill and at high risk for organ deterioration.    Thank you for the consultation.  Will follow with you.    Luis Waller MD  [facility]  Office: [phone]

## 2024-03-22 NOTE — ANESTHESIA PROCEDURE NOTES
Peripheral IV  Date/Time: 3/21/2024 8:38 PM  Inserted by: Muriel Copeland MD    Placement  Needle size: 18 G  Laterality: left  Location: forearm  Site prep: alcohol  Technique: anatomical landmarks  Attempts: 1

## 2024-03-22 NOTE — PHYSICAL THERAPY NOTE
Physical Therapy    Pt w/ L craniotomy for hematoma evacuation last night.  Pt remains intubated and sedated at this time.  Will re-attempt as schedule and pt's medical stability allow.

## 2024-03-22 NOTE — ANESTHESIA POSTPROCEDURE EVALUATION
[facility]    Leanne Ching Patient Status:  Inpatient   Age/Gender 63 year old female MRN [id number]   Location [facility] 6NE-A Attending Adelina Morris MD   Hosp Day # 2 PCP Shelby Frost MD       Anesthesia Post-op Note    LEFT CRANIOTOMY FOR HEMATOMA EVACUATION WITH STEALTH NAVIAGTION    Procedure Summary       Date: 03/21/24 Room / Location:  MAIN OR  /  MAIN OR    Anesthesia Start: 2026 Anesthesia Stop: 03/22/24 0022    Procedure: LEFT CRANIOTOMY FOR HEMATOMA EVACUATION WITH STEALTH NAVIAGTION (Left: Head) Diagnosis:       Hematoma      (Hematoma [T14.8XXA])    Surgeons: Jimbo Romero MD Anesthesiologist: Muriel Copeland MD    Anesthesia Type: general ASA Status: 4 - Emergent            Anesthesia Type: general    Vitals Value Taken Time   /72 03/22/24 0030   Temp 97 °F (36.1 °C) 03/22/24 0018   Pulse 82 03/22/24 0042   Resp 16 03/22/24 0042   SpO2 100 % 03/22/24 0042   Vitals shown include unfiled device data.    Patient Location: ICU    Anesthesia Type: general    Airway Patency: intubated    Postop Pain Control: Other: (Intubated, sedated)    Mental Status: Other: (Intubated, sedated)    Nausea/Vomiting: Other: (Intubated, sedated)    Cardiopulmonary/Hydration status: stable euvolemic    Complications: no apparent anesthesia related complications    Postop vital signs: stable    Comments: Patient transferred to CCU on monitors.   Vitals stable. Propofol 60mcg/kg/min.   ICU RN Janny received hand off.    Dental Exam: Unchanged from Preop    Sign out given to ICU staff.

## 2024-03-22 NOTE — ANESTHESIA PREPROCEDURE EVALUATION
PRE-OP EVALUATION    Patient Name: Leanne Ching    Admit Diagnosis: ICH (intracerebral hemorrhage) (HCC) [I61.9]    Pre-op Diagnosis: Hematoma [T14.8XXA]    LEFT CRANIOTOMY FOR HEMATOMA EVACUATION    Anesthesia Procedure: LEFT CRANIOTOMY FOR HEMATOMA EVACUATION (Left)    Surgeon(s) and Role:     * Jimbo Romero MD - Primary    Pre-op vitals reviewed.  Temp: 99 °F (37.2 °C)  Pulse: 75  Resp: 16  BP: 152/81  SpO2: 98 %  Body mass index is 22.56 kg/m².    Current medications reviewed.  Hospital Medications:   midazolam (Versed) 2 MG/2ML injection 2 mg  2 mg Intravenous Q15 Min PRN    traMADol (Ultram) tab 50 mg  50 mg Oral Q6H PRN    [COMPLETED] potassium chloride 40 mEq in 250mL sodium chloride 0.9% IVPB premix  40 mEq Intravenous Once    [COMPLETED] fentaNYL (Sublimaze) 50 mcg/mL injection        [COMPLETED] midazolam (Versed) 2 MG/2ML injection        [COMPLETED] heparin (Porcine) 5000 UNIT/ML injection        [COMPLETED] heparin (Porcine) 5000 UNIT/ML injection        [COMPLETED] lidocaine (Xylocaine) 1 % injection        [COMPLETED] nitroGLYCERIN (Nitrobid) 2 % ointment        [COMPLETED] heparin (Porcine) 5000 UNIT/ML injection        [COMPLETED] verapamil (Isoptin) 2.5 mg/mL injection        [COMPLETED] Nitroglycerin in D5W 200-5 MCG/ML-% injection        [COMPLETED] iodixanol (VISIPAQUE) 320 MG/ML injection 300 mL  300 mL Injection ONCE PRN    morphINE PF 2 MG/ML injection 1 mg  1 mg Intravenous Q2H PRN    Or    morphINE PF 2 MG/ML injection 2 mg  2 mg Intravenous Q2H PRN    niCARdipine in sodium chloride 0.86% (carDENE) 20 mg/200mL infusion premix  5-15 mg/hr Intravenous Continuous PRN    sodium chloride 0.9% infusion   Intravenous Continuous    [COMPLETED] levETIRAcetam (Keppra) 3,750 mg in sodium chloride 0.9% 100 mL IVPB  60 mg/kg Intravenous Once    And    [START ON 3/22/2024] levETIRAcetam (Keppra) 500 mg/5mL injection 1,000 mg  1,000 mg Intravenous Q12H    [COMPLETED] iopamidol (ISOVUE-370) 76 %  injection 50 mL  50 mL Intravenous ONCE PRN    [COMPLETED] labetalol (Trandate) 5 mg/mL injection 20 mg  20 mg Intravenous Once    [COMPLETED] labetalol (Trandate) 5 mg/mL injection 10 mg  10 mg Intravenous Once    sodium chloride 0.9% infusion   Intravenous Continuous    acetaminophen (Tylenol) tab 650 mg  650 mg Oral Q4H PRN    Or    acetaminophen (Tylenol) rectal suppository 650 mg  650 mg Rectal Q4H PRN    [COMPLETED] labetalol (Trandate) 5 mg/mL injection 10 mg  10 mg Intravenous Q10 Min PRN    hydrALAzine (Apresoline) 20 mg/mL injection 10 mg  10 mg Intravenous Q2H PRN    ondansetron (Zofran) 4 MG/2ML injection 4 mg  4 mg Intravenous Q6H PRN    Or    metoclopramide (Reglan) 5 mg/mL injection 10 mg  10 mg Intravenous Q8H PRN    melatonin tab 3 mg  3 mg Oral Nightly PRN    acetaminophen (Tylenol Extra Strength) tab 500 mg  500 mg Oral Q4H PRN    polyethylene glycol (PEG 3350) (Miralax) 17 g oral packet 17 g  17 g Oral Daily PRN    sennosides (Senokot) tab 17.2 mg  17.2 mg Oral Nightly PRN    bisacodyl (Dulcolax) 10 MG rectal suppository 10 mg  10 mg Rectal Daily PRN    fleet enema (Fleet) 7-19 GM/118ML rectal enema 133 mL  1 enema Rectal Once PRN       Outpatient Medications:     Medications Prior to Admission   Medication Sig Dispense Refill Last Dose    niCARdipine in sodium chloride 0.86% 20 mg/200mL Intravenous Solution Inject 5-15 mg/hr into the vein continuous.       acetaminophen 325 MG Oral Tab Take 2 tablets (650 mg total) by mouth every 4 (four) hours as needed.       ondansetron 4 MG/2ML Injection Solution Inject 2 mL (4 mg total) into the vein every 6 (six) hours as needed.       polyethylene glycol, PEG 3350, 17 g Oral Powd Pack Take 17 g by mouth daily as needed (If no bowel movement in last 24 hours).       sennosides 17.2 MG Oral Tab Take 1 tablet (17.2 mg total) by mouth nightly as needed (constipation, as needed if no bowel movement that day).       bisacodyl 10 MG Rectal Suppos Place 1  suppository (10 mg total) rectally daily as needed.       sodium chloride 0.9% PF 0.9% SOLN 10 mL with pantoprazole 40 MG SOLR 40 mg Inject 40 mg into the vein daily.       amLODIPine 5 MG Oral Tab Take 1 tablet (5 mg total) by mouth daily.       sodium chloride 0.9% Intravenous Inject into the vein continuous.       simvastatin 10 MG Oral Tab Take 1 tablet (10 mg total) by mouth nightly.          Allergies: Patient has no known allergies.      Anesthesia Evaluation    Patient summary reviewed.    Anesthetic Complications  (-) history of anesthetic complications         GI/Hepatic/Renal    Negative GI/hepatic/renal ROS.                             Cardiovascular      ECG reviewed.            (+) hypertension                                     Endo/Other    Negative endo/other ROS.                              Pulmonary    Negative pulmonary ROS.                       Neuro/Psych                              IPH with worsening neuro exam and interval increase by CT        Past Surgical History:   Procedure Laterality Date    2012    COLONOSCOPY N/A 2018    Procedure: COLONOSCOPY;  Surgeon: Jacinda Cruz MD;  Location: [facility] ENDOSCOPY          x 2     Social History     Socioeconomic History    Marital status:    Tobacco Use    Smoking status: Never    Smokeless tobacco: Never   Substance and Sexual Activity    Alcohol use: No    Drug use: No     History   Drug Use No     Available pre-op labs reviewed.  Lab Results   Component Value Date    WBC 7.6 2024    RBC 4.24 2024    HGB 12.4 2024    HCT 37.8 2024    MCV 89.2 2024    MCH 29.2 2024    MCHC 32.8 2024    RDW 14.5 2024    .0 2024     Lab Results   Component Value Date     2024    K 3.5 2024     (H) 2024    CO2 24.0 2024    BUN 16 2024    CREATSERUM 0.59 2024     (H) 2024    CA 8.7 2024     Lab Results    Component Value Date    INR 0.89 03/19/2024         Airway      Mallampati: II  Mouth opening: >3 FB  TM distance: > 6 cm  Neck ROM: full Cardiovascular    Cardiovascular exam normal.  Rhythm: regular  Rate: normal     Dental  Comment: Denies chipped or loose teeth           Pulmonary    Pulmonary exam normal.  Breath sounds clear to auscultation bilaterally.               Other findings              ASA: 4 and emergent  Plan: general  NPO status verified and patient meets guidelines.    Post-procedure pain management plan discussed with surgeon and patient.    Comment: Risks include but limited to oral and dental injury, nausea/vomiting, anaphylaxis, prolonged ventilation and myocardial infarction. All questions were answered to the patient's satisfaction. Patient expressed understanding and wishes to proceed.   Plan/risks discussed with: patient and spouse                Present on Admission:   Intracranial bleed (HCC)   Nontraumatic cortical hemorrhage of left cerebral hemisphere (HCC)   Hypertension   Vasogenic brain edema (HCC)   Hypertensive emergency         Yes

## 2024-03-22 NOTE — PROGRESS NOTES
03/22/24 1345   Vent Information   $ RT Standby Charge (per 15 min) 1   Vent Discontinued Date 03/22/24   Vent Discontinue Time 1345   Spontaneous Breathing Trial   Spontaneous Breathing Trial Complete Y   Weaning Trials   Spontaneous Breathing Trial Duration 4 hours   Post Trial HR 80   Post Trial RR 16   Post Trial SpO2 100 %   Post Trial /57   Post Trial Vt 433     Pt extubated to a 2L nc. Saturation 100% resting comfortably

## 2024-03-22 NOTE — PLAN OF CARE
Assumed cares about 1930, VS stable on RA at start of shift.  Patient in OR for CHANTAL suhlamin and evac with Dr. Romero, returned to room 6612 about 0015. Patient remained intubated overnight per MD orders.  SBP maintained within parameters of , cardene infusing overnight, propofol for sedation, see MAR for titration.  Neuro assessments with pupillometer q1h, unable to assess motor movements, patient remained sedated to maintain low SBP.  EEG reconnected overnight when patient returned to room.   Bilateral wrist restraints on to prevent removal of medical equipment.  Adequate urine output overnight per dinero.  Repeat head CT completed this AM.  No further concerns at this time.    Problem: NEUROLOGICAL - ADULT  Goal: Achieves stable or improved neurological status  Description: INTERVENTIONS  - Assess for and report changes in neurological status  - Initiate measures to prevent increased intracranial pressure  - Maintain blood pressure and fluid volume within ordered parameters to optimize cerebral perfusion and minimize risk of hemorrhage  - Monitor temperature, glucose, and sodium. Initiate appropriate interventions as ordered  Outcome: Progressing  Goal: Achieves maximal functionality and self care  Description: INTERVENTIONS  - Monitor swallowing and airway patency with patient fatigue and changes in neurological status  - Encourage and assist patient to increase activity and self care with guidance from PT/OT  - Encourage visually impaired, hearing impaired and aphasic patients to use assistive/communication devices  Outcome: Progressing  Goal: Absence of seizures  Description: INTERVENTIONS  - Monitor for seizure activity  - Administer anti-seizure medications as ordered  - Monitor neurological status  Outcome: Progressing  Goal: Remains free of injury related to seizure activity  Description: INTERVENTIONS:  - Maintain airway, patient safety  and administer oxygen as ordered  - Monitor patient for seizure  activity, document and report duration and description of seizure to MD/LIP  - If seizure occurs, turn patient to side and suction secretions as needed  - Reorient patient post seizure  - Seizure pads on all 4 side rails  - Instruct patient/family to notify RN of any seizure activity  - Instruct patient/family to call for assistance with activity based on assessment  Outcome: Progressing     Problem: Safety Risk - Non-Violent Restraints  Goal: Patient will remain free from self-harm  Description: INTERVENTIONS:  - Apply the least restrictive restraint to prevent harm  - Notify patient and family of reasons restraints applied  - Assess for any contributing factors to confusion (electrolyte disturbances, delirium, medications)  - Discontinue any unnecessary medical devices as soon as possible  - Assess the patient's physical comfort, circulation, skin condition, hydration, nutrition and elimination needs   - Reorient and redirection as needed  - Assess for the need to continue restraints  Outcome: Progressing     Problem: Patient/Family Goals  Goal: Patient/Family Long Term Goal  Description: Patient's Long Term Goal: Return home with  at near baseline ADLs    Interventions:  - follow plan of cares, scheduled medications per neurology, assessments per protocol, PT/OT when patient more alert  - See additional Care Plan goals for specific interventions  Outcome: Progressing  Goal: Patient/Family Short Term Goal  Description: Patient's Short Term Goal: Maintain SBP within parameters set per neurosurgery    Interventions:   - PRN medications, BP infusions, pain management, sedation management while intubated  - See additional Care Plan goals for specific interventions  Outcome: Progressing     Problem: PAIN - ADULT  Goal: Verbalizes/displays adequate comfort level or patient's stated pain goal  Description: INTERVENTIONS:  - Encourage pt to monitor pain and request assistance  - Assess pain using appropriate pain  scale  - Administer analgesics based on type and severity of pain and evaluate response  - Implement non-pharmacological measures as appropriate and evaluate response  - Consider cultural and social influences on pain and pain management  - Manage/alleviate anxiety  - Utilize distraction and/or relaxation techniques  - Monitor for opioid side effects  - Notify MD/LIP if interventions unsuccessful or patient reports new pain  - Anticipate increased pain with activity and pre-medicate as appropriate  Outcome: Progressing     Problem: RISK FOR INFECTION - ADULT  Goal: Absence of fever/infection during anticipated neutropenic period  Description: INTERVENTIONS  - Monitor WBC  - Administer growth factors as ordered  - Implement neutropenic guidelines  Outcome: Progressing     Problem: SAFETY ADULT - FALL  Goal: Free from fall injury  Description: INTERVENTIONS:  - Assess pt frequently for physical needs  - Identify cognitive and physical deficits and behaviors that affect risk of falls.  - [place] fall precautions as indicated by assessment.  - Educate pt/family on patient safety including physical limitations  - Instruct pt to call for assistance with activity based on assessment  - Modify environment to reduce risk of injury  - Provide assistive devices as appropriate  - Consider OT/PT consult to assist with strengthening/mobility  - Encourage toileting schedule  Outcome: Progressing     Problem: DISCHARGE PLANNING  Goal: Discharge to home or other facility with appropriate resources  Description: INTERVENTIONS:  - Identify barriers to discharge w/pt and caregiver  - Include patient/family/discharge partner in discharge planning  - Arrange for needed discharge resources and transportation as appropriate  - Identify discharge learning needs (meds, wound care, etc)  - Arrange for interpreters to assist at discharge as needed  - Consider post-discharge preferences of patient/family/discharge partner  - Complete POLST  form as appropriate  - Assess patient's ability to be responsible for managing their own health  - Refer to Case Management Department for coordinating discharge planning if the patient needs post-hospital services based on physician/LIP order or complex needs related to functional status, cognitive ability or social support system  Outcome: Progressing

## 2024-03-22 NOTE — ANESTHESIA PROCEDURE NOTES
Airway  Date/Time: 3/21/2024 8:34 PM  Urgency: elective    Airway not difficult    General Information and Staff    Patient location during procedure: OR  Anesthesiologist: Muriel Copeland MD  Performed: anesthesiologist   Performed by: Muriel Copeland MD  Authorized by: Muriel Copeland MD      Indications and Patient Condition  Indications for airway management: anesthesia  Spontaneous Ventilation: absent  Sedation level: deep  Preoxygenated: yes  Patient position: sniffing  Mask difficulty assessment: 0 - not attempted    Final Airway Details  Final airway type: endotracheal airway      Successful airway: ETT  Cuffed: yes   Successful intubation technique: direct laryngoscopy  Endotracheal tube insertion site: oral  Blade: Christian  Blade size: #3  ETT size (mm): 7.5    Cormack-Lehane Classification: grade I - full view of glottis  Placement verified by: capnometry   Measured from: lips  ETT to lips (cm): 21  Number of attempts at approach: 1

## 2024-03-22 NOTE — OCCUPATIONAL THERAPY NOTE
Noted patient s/p L craniotomy for hematoma evacuation last noc and remains intubated and sedated this am.  Will hold and continue to follow, completing evaluation when clinically appropriate.

## 2024-03-22 NOTE — OPERATIVE REPORT
MRN: [id number]  Patient Name: Leanne Ching  YOB: 1960     DATE OF OPERATION: 3/22/2024    PREOPERATIVE DIAGNOSES:  1. Left frontal intraparenchymal hematoma     POSTOPERATIVE DIAGNOSES:    1. Left frontal intraparenchymal hematoma     PROCEDURES PERFORMED:  1. Left craniotomy for intraparenchymal hematoma evacuation  2. Intraoperative microscope  3. Intraoperative ultrasound  4. Intraoperative neuronavigation     SURGEON:   Jimbo Romero M.D.     ASSISTANT:  Vinay Murillo SA      ANESTHESIA:  General endotracheal.     ESTIMATED BLOOD LOSS:  25mL     INDICATIONS FOR PROCEDURE:  Please see the hospital notes for further information.  Briefly, this patient is a 63 year old female who presented to the [facility] ED with expressive aphasia and right sided weakness.  Head CT demonstrate an 4.5cm left frontal intraparenchymal hematoma.  CTA demonstrated a vascular blush within the hematoma convern for active extravasation and/or a vascular lesion. DCA was negative for a vascular malformation. She became progressively more drowsy throughout the day and aphasia worsened, no longer following commands.  She had a focal motor seizure with no subsequent EEG findings for ongoing seizures. Given the progressive neurological decline and large hematoma, following discussion of the risks, benefits, alternatives, goals, and expectations with the patient's  and children, they decided to proceed with surgery.     DESCRIPTION OF PROCEDURE:  The patient was brought to the operating room and a timeout was performed per protocol. General anesthesia was induced. Lines and monitors were started by the anesthesiologist.      The patient was placed in a supine position on a standard operating table.  The patient's head was afixed with a Diaz headholder and rotated to expose the left side of the head. Neuronavigation was registered. Hair was removed with clippers.  Alcohol were used to clean the scalp.  The incision was  marked out.  DuraPrep was applied followed by standard sterile surgical draping. Antibiotics were administered.  Lidocaine with epinephrine was injected.       A skin incision was made with a #10 scalpel blade.  A subperiosteal dissection was carried out.  The flap was retained with self retraining retractors. Destinee holes were placed. Dural dissection was performed. The craniotome was then used to fashion a craniotomy.  The bone flap was removed and placed on the back table.  Hemostasis was achieved.     Ultrasound was used to confirm the craniotomy was over the site that the hematoma came to the surface. Neuronavigation was used to confirm the trajectory of the hematoma. The microscope was brought into the field. The dura was then opened in a stellate fashion.  Intraoperative ultrasound was again used to confirm hematoma location as it came to the surface.  A sub-centimeter corticotomy was made at the superficial site of the hematoma.  The hematoma was then evacuated with aspiration, hydro dissection, and bipolar dissection.  The hematoma cavity was copiously irrigated and meticulous hemostasis was achieved.  Surgicel was used to line the resection cavity.  The ultrasound was used to confirm complete hematoma evacuation. The incision was irrigated copiously. The dural leaflets were re-approximated and hemostasis was confirmed. Gelfoam was onlay over the durotomy.  The bone was re-affixed to the cranium with a Nancy plating system.     The incision was irrigated once again and hemostasis was confirmed. Galea was closed with interrupted inverted 2-0 Vicryls.  Skin was closed with staples. Drapes were removed.  Dressings were applied. The patient was then returned to a neutral, supine position.  The patient remained intubated and transferred to the CT scanner under my supervision, followed by ICU.     DISPOSITION:  Neurological intensive care unit.      COUNTS:  All needle, sponge and cottonoid counts reported  correct at the end of the operation.     COMPLICATIONS:  None.     IMPLANTS:  [employer] [place] cranial plating system     WOUND CLASSIFICATION:  1

## 2024-03-22 NOTE — SLP NOTE
Patient is s/p L craniotomy for evacuation last night and remains intubated and sedated this AM.  Will defer evaluation and continue to follow, completing evaluation when clinically appropriate.   Monique Adorno MS CCC-SLP/L, pager [phone]  Speech-LanguagePathologist

## 2024-03-22 NOTE — PLAN OF CARE
Assumed patient care at 0730. Patient intubated and sedated with no signs of pain. Nicardipine infusing to maintain SBP 90-140mmHg. Propofol infusing for sedation.     Sedation weaned for breathing trial; patient placed on pressure support trial. Sats remain >92%. Patient extubated to 2L NC without issue.     Patient and family aware of plan of care and endorses understanding. All questions answered

## 2024-03-22 NOTE — SLP NOTE
Patient extubated this afternoon; not appropriate for evaluation at this time as patient sleeping and under EEG.  D/W spouse at bedside. Will follow-up tomorrow AM and attempt swallow evaluation, if clinically appropriate.   Monique Adorno MS CCC-SLP/L, pager [phone]  Speech-Language Pathologist

## 2024-03-22 NOTE — PROGRESS NOTES
[facility]  Neurosurgery Progress Note    Leanne Ching Patient Status:  Inpatient    1960 MRN [id number]   Location [facility] 6NE-A Attending Adelina Morris MD   Hosp Day # 2 PCP Shelby Frost MD     Chief Complaint:  ICH    POD #0 s/p left craniotomy    Subjective:  Left craniotomy last night. Currently on sedation. CT head this AM stable.     Objective/Physical Exam:    Vital Signs:  Blood pressure 116/69, pulse 84, temperature 96.9 °F (36.1 °C), temperature source Temporal, resp. rate 16, height 65\", weight 135 lb 9.3 oz (61.5 kg), SpO2 100%.  Respiratory: Intubated on vent  CV:  well perfused  General: Eyes closed  Neurologic:   As sedation is coming off, able to spontaneously move left side. Not following commands as of yet, but will reassess as sedation comes off.      Labs:  Recent Labs   Lab 247 24  0406   RBC 4.59 4.24 3.95   HGB 13.0 12.4 11.5*   HCT 41.3 37.8 35.1   MCV 90.0 89.2 88.9   MCH 28.3 29.2 29.1   MCHC 31.5 32.8 32.8   RDW 14.0 14.5 14.4   NEPRELIM 4.16 4.29  --    WBC 9.2 7.6 10.0   .0 292.0 290.0       Recent Labs   Lab 247 24  0406   * 116* 164*   BUN 14 16 14   CREATSERUM 0.82 0.59 0.49*   EGFRCR 79 100 105   CA 10.2 8.7 8.1*    140 141   K 4.0 3.5 3.3*    113* 116*   CO2 26.0 24.0 21.0       Imaging:  CT BRAIN OR HEAD ([id number])    Result Date: 3/22/2024  CONCLUSION:   1. Postoperative changes from left frontoparietal craniotomy with surgical clips along the left scalp are again noted.  There is partial redistribution of the previously visualized postprocedural pneumocephalus most pronounced along the anterior cerebral  convexities bilaterally.  Mixed attenuation extra-axial collection containing fluid, blood, and air along the left cerebral convexity measuring up to 13 mm in thickness, previously 16 mm.  Decreased left-to-right midline shift of approximately 3 mm,  previously 6 mm.  2. Redemonstration of changes related to partial evacuation of parenchymal hematoma in the left frontal parietal region the largest residual component again measures approximately 1.4 x 0.5 x 1.6 cm.  A 2nd stable small residual component measures approximately 0.8 x 0.8 x 1.1 cm.  Small amount of edema and air attenuation noted along the operative bed.  Clinical correlation and continued follow-up is suggested.  LOCATION:  [facility]   Dictated by (CST): Husam Martin MD on 3/22/2024 at 6:22 AM     Finalized by (CST): Husam Martin MD on 3/22/2024 at 6:28 AM       XR CHEST AP PORTABLE  (CPT=71045)    Result Date: 3/22/2024  CONCLUSION:  Endotracheal tube tip below thoracic inlet with the tip positioned 2.6 cm from the jakub.  OG tube tip in the stomach  A preliminary report was provided by the [facility] service.  LOCATION:  [facility]      Dictated by (CST): Husam Martin MD on 3/22/2024 at 5:47 AM     Finalized by (CST): Husam Martin MD on 3/22/2024 at 5:48 AM       CT BRAIN OR HEAD ([id number])    Result Date: 3/22/2024  CONCLUSION:   1. Interval changes of left frontoparietal craniotomy with surgical clips along the left scalp.  Moderate amount of postprocedural pneumocephalus most pronounced along left cerebral convexity.  Mixed attenuation extra-axial collection containing fluid and air along the left cerebral convexity measuring up to 16 mm in thickness.  There is left-to-right midline shift of approximately 6 mm.  2. Interval partial evacuation of parenchymal hematoma in the left frontal parietal region the largest residual component measures approximately 1.4 x 0.5 x 1.6 cm, previously 4.0 x 3.2 x 5.0 cm.  A 2nd small residual component measures approximately 0.8  x 0.8 x 1.1 cm.  Small amount of edema and air attenuation noted along the operative bed.  Clinical correlation and continued follow-up is suggested.  A preliminary report was provided by the [facility].   LOCATION:  [id number]   Dictated by (CST): Husam Martin MD on 3/22/2024 at 4:59 AM     Finalized by (CST): Husam Martin MD on 3/22/2024 at 5:03 AM       US INTRAOPERATIVE GUIDANCE (CPT=76998)    Result Date: 3/22/2024  CONCLUSION:  Please see the procedure/operative report for further details.    LOCATION:  [facility]    Dictated by (CST): Stromberg, LeRoy, MD on 3/22/2024 at 0:06 AM     Finalized by (CST): Stromberg, LeRoy, MD on 3/22/2024 at 0:07 AM       CT BRAIN OR HEAD ([id number])    Result Date: 3/21/2024  CONCLUSION:  Slight interval increase in size of left frontal region parenchymal hematoma with measurements given above.  There is mass effect related to this but no midline shift.  Above findings were reported to IONA Abdi on March 21, 2024 at 3:32 p.m..    LOCATION:  [facility]   Dictated by (CST): Jose Alberto Min MD on 3/21/2024 at 3:29 PM     Finalized by (CST): Jose Alberto Min MD on 3/21/2024 at 3:32 PM       CT BRAIN OR HEAD ([id number])    Result Date: 3/21/2024  CONCLUSION:  4.5 x 3.7 x 3.4 cm acute intraparenchymal hematoma arising from the posterior left frontal lobe with localized mass effect and a small amount of surrounding vasogenic edema and mild midline shift to the right of 3 mm.  No significant change.      LOCATION:  [facility]   Dictated by (CST): Charis Barnes MD on 3/21/2024 at 6:25 AM     Finalized by (CST): Charis Barnes MD on 3/21/2024 at 6:36 AM       CT STROKE CTA BRAIN/CTA NECK (W IV)(CPT=70496/02025)    Result Date: 3/20/2024  CONCLUSION:   Large left frontal intraparenchymal hematoma again noted.  There is associated internal serpiginous enhancement, which may represent an abnormal tangle of vessels.  Finding raises the possibility of an underlying vascular malformation.  Neuro surgical consultation has already been placed at this time.  Further assessment with diagnostic interventional angiography and/or contrast enhanced MRI brain would be helpful.  Local mass effect from left frontal  intraparenchymal hematoma as well as left to right 5 millimeter midline shift again noted.  No flow-limiting stenosis within the major arteries of the neck.  Approximately 36% narrowing of the left carotid bulb by atherosclerotic plaque.  No significant narrowing of the right carotid bulb.  No high-grade flow-limiting stenosis within the major arteries of the [id number] intracranially.     Dictated by (CST): Giselle Alexander MD on 3/20/2024 at 8:05 AM     Finalized by (CST): Giselle Alexander MD on 3/20/2024 at 8:22 AM          CT BRAIN OR HEAD ([id number])    Result Date: 3/20/2024  CONCLUSION:  1. Large acute 4.5 x 3.5 cm intraparenchymal hematoma centered at the periphery of the left frontal lobe.  Although the hematoma largely appears similar in size and morphology since examination from approximately 6.5 hours prior, there is a small curvilinear band of acute hemorrhagic components along the posterior margin of the hematoma, which are new since prior.  Mild surrounding vasogenic edema appears unchanged.  Stable left cerebral hemispheric mass effect with mild 2-3 mm left-to-right midline shift.  Continued close follow-up to resolution is advised. 2. Intracranial atherosclerosis.   Dictated by (CST): Finesse Martinez MD on 3/20/2024 at 7:54 AM     Finalized by (CST): Finesse Martinez MD on 3/20/2024 at 7:59 AM          XR CHEST AP PORTABLE  (CPT=71045)    Result Date: 3/20/2024  CONCLUSION:   No radiographic evidence of acute cardiopulmonary process.  A preliminary report was issued by the [facility] teleradiology service. There are no clinically actionable discrepancies.    Dictated by (CST): Giselle Alexander MD on 3/20/2024 at 7:46 AM     Finalized by (CST): Giselle Alexander MD on 3/20/2024 at 7:46 AM          CT STROKE BRAIN (NO IV)(CPT=70450)    Result Date: 3/20/2024  CONCLUSION:   Large 4.8 centimeter left frontal intraparenchymal hematoma with moderate local mass effect, as well as 3 millimeters  left-to-right midline shift.  Neurosurgical consultation is recommended.  Follow-up CT brain is recommended.  Differential considerations include underlying vascular etiologies such as AVM, a hemorrhagic mass, or other etiologies.   This report was called immediately at 105 Eastern time on 03/20/2024 hours to Dr. Angeles by Dr Bryant Reynoso   A preliminary report was issued by the [facility] teleradiology service. There are no clinically actionable discrepancies.  Dictated by (CST): Giselle Alexander MD on 3/20/2024 at 7:16 AM     Finalized by (CST): Giselle Alexander MD on 3/20/2024 at 7:23 AM              Assessment:  62 yo female with left frontal ICH s/p craniotomy    Plan:  Wean sedation and extubate as appropriate  OK to raise HOB  CT head this AM stable  Keep SBP <140  DVT prophylaxis SCDs    Is this a shared or split note between Advanced Practice Provider and Physician? Yes       ALISON Lozada  [facility]  3/22/2024, 8:11 AM  Spectre [id number]

## 2024-03-22 NOTE — PROGRESS NOTES
[facility]  Neurocritical Care Progress Note     Leanne Ching Patient Status:  Inpatient    1960 MRN [id number]   Location [facility] 6NE-A Attending Adelina Morris MD   Hosp Day # 2 PCP Shelby Frost MD     Subjective:   Patient is a 63 year old female h/o hl p/w L frontal ich 3/19   Hospital course significant for onset of R sided weakness and slurred speech thus went to [facility] ED where w/u revealed SBP>190 and ct head with 3.6x3.6x4.8cm L frontal ich with mass effect and 3mm midline shift, and cta c/f underlying vasc malformation , angio neg 3/21, worsening mentation post angio w/R facial twitching but no eeg correlate, and decision made to proceed with crani for evac    Vitals:   Temp:  [96.9 °F (36.1 °C)-99 °F (37.2 °C)] 96.9 °F (36.1 °C)  Pulse:  [] 84  Resp:  [12-23] 16  BP: (104-157)/(60-95) 116/69  SpO2:  [97 %-100 %] 100 %  AO: ()/(50-70) 113/54  FiO2 (%):  [35 %-40 %] 35 %  Body mass index is 22.56 kg/m².    Intake/Output:    Intake/Output Summary (Last 24 hours) at 3/22/2024 0903  Last data filed at 3/22/2024 0800  Gross per 24 hour   Intake 3859.5 ml   Output 1925 ml   Net 1934.5 ml     Current Meds:  Current Facility-Administered Medications   Medication Dose Route Frequency    glucose (Dex4) 15 GM/59ML oral liquid 15 g  15 g Oral Q15 Min PRN    Or    glucose (Glutose) 40% oral gel 15 g  15 g Oral Q15 Min PRN    Or    glucose-vitamin C (Dex-4) chewable tab 4 tablet  4 tablet Oral Q15 Min PRN    Or    dextrose 50% injection 50 mL  50 mL Intravenous Q15 Min PRN    Or    glucose (Dex4) 15 GM/59ML oral liquid 30 g  30 g Oral Q15 Min PRN    Or    glucose (Glutose) 40% oral gel 30 g  30 g Oral Q15 Min PRN    Or    glucose-vitamin C (Dex-4) chewable tab 8 tablet  8 tablet Oral Q15 Min PRN    metoclopramide (Reglan) 5 mg/mL injection 10 mg  10 mg Intravenous Q8H PRN    fentaNYL (Sublimaze) 50 mcg/mL injection 25 mcg  25 mcg Intravenous Q30 Min PRN    Or    fentaNYL  (Sublimaze) 50 mcg/mL injection 50 mcg  50 mcg Intravenous Q30 Min PRN    acetaminophen (Tylenol) tab 650 mg  650 mg Oral Q4H PRN    Or    acetaminophen (Tylenol) 160 MG/5ML oral liquid 650 mg  650 mg Oral Q4H PRN    Or    acetaminophen (Tylenol) rectal suppository 650 mg  650 mg Rectal Q4H PRN    Or    acetaminophen (Ofirmev) 10 mg/mL infusion premix 1,000 mg  1,000 mg Intravenous Q6H PRN    fentaNYL (Sublimaze) 25 mcg BOLUS FROM BAG infusion  25 mcg Intravenous Q30 Min PRN    Or    fentaNYL (Sublimaze) 50 mcg BOLUS FROM BAG infusion  50 mcg Intravenous Q30 Min PRN    fentaNYL in sodium chloride 0.9% (Sublimaze) 1000 mcg/100mL infusion premix   mcg/hr Intravenous Continuous PRN    senna (Senokot) 8.8 MG/5ML oral syrup 17.6 mg  10 mL Oral Nightly PRN    chlorhexidine gluconate (Peridex) 0.12 % oral solution 15 mL  15 mL Mouth/Throat BID@0800,2000    propofol (Diprivan) 10 mg/mL infusion premix  5-50 mcg/kg/min (Dosing Weight) Intravenous Continuous    pantoprazole (Protonix) 40 mg in sodium chloride 0.9% PF 10 mL IV push  40 mg Intravenous QAM AC    labetalol (Trandate) 5 mg/mL injection 10 mg  10 mg Intravenous Q4H PRN    insulin aspart (NovoLOG) 100 Units/mL FlexPen 1-5 Units  1-5 Units Subcutaneous 4 times per day    potassium chloride 40 mEq in 250mL sodium chloride 0.9% IVPB premix  40 mEq Intravenous Once    midazolam (Versed) 2 MG/2ML injection 2 mg  2 mg Intravenous Q15 Min PRN    traMADol (Ultram) tab 50 mg  50 mg Oral Q6H PRN    niCARdipine in sodium chloride 0.86% (carDENE) 20 mg/200mL infusion premix  5-15 mg/hr Intravenous Continuous PRN    levETIRAcetam (Keppra) 500 mg/5mL injection 1,000 mg  1,000 mg Intravenous Q12H    sodium chloride 0.9% infusion   Intravenous Continuous    hydrALAzine (Apresoline) 20 mg/mL injection 10 mg  10 mg Intravenous Q2H PRN    ondansetron (Zofran) 4 MG/2ML injection 4 mg  4 mg Intravenous Q6H PRN    Or    metoclopramide (Reglan) 5 mg/mL injection 10 mg  10 mg  Intravenous Q8H PRN    melatonin tab 3 mg  3 mg Oral Nightly PRN    polyethylene glycol (PEG 3350) (Miralax) 17 g oral packet 17 g  17 g Oral Daily PRN    sennosides (Senokot) tab 17.2 mg  17.2 mg Oral Nightly PRN    bisacodyl (Dulcolax) 10 MG rectal suppository 10 mg  10 mg Rectal Daily PRN    fleet enema (Fleet) 7-19 GM/118ML rectal enema 133 mL  1 enema Rectal Once PRN     Physical Examination:   General- intubated   CV- RRR  Resp- coarse mechanical breath sounds bilat  Neuro-  Mental status- opens eyes to stim, regards and follows some commands  Cranial nerves- pupils equally round and reactive to light, +corneal/cough  Motor/Sens- moves L spont, R HP    Diagnostics:   CT BRAIN OR HEAD ([id number])    Result Date: 3/22/2024  CONCLUSION:   1. Postoperative changes from left frontoparietal craniotomy with surgical clips along the left scalp are again noted.  There is partial redistribution of the previously visualized postprocedural pneumocephalus most pronounced along the anterior cerebral  convexities bilaterally.  Mixed attenuation extra-axial collection containing fluid, blood, and air along the left cerebral convexity measuring up to 13 mm in thickness, previously 16 mm.  Decreased left-to-right midline shift of approximately 3 mm, previously 6 mm.  2. Redemonstration of changes related to partial evacuation of parenchymal hematoma in the left frontal parietal region the largest residual component again measures approximately 1.4 x 0.5 x 1.6 cm.  A 2nd stable small residual component measures approximately 0.8 x 0.8 x 1.1 cm.  Small amount of edema and air attenuation noted along the operative bed.  Clinical correlation and continued follow-up is suggested.  LOCATION:  [facility]   Dictated by (CST): Husam Martin MD on 3/22/2024 at 6:22 AM     Finalized by (CST): Husam Martin MD on 3/22/2024 at 6:28 AM       XR CHEST AP PORTABLE  (CPT=71045)    Result Date: 3/22/2024  CONCLUSION:  Endotracheal tube tip below  thoracic inlet with the tip positioned 2.6 cm from the jakub.  OG tube tip in the stomach  A preliminary report was provided by the [facility] teleradiology service.  LOCATION:  [facility]      Dictated by (CST): Husam Martin MD on 3/22/2024 at 5:47 AM     Finalized by (CST): Husam Martin MD on 3/22/2024 at 5:48 AM       CT BRAIN OR HEAD ([id number])    Result Date: 3/22/2024  CONCLUSION:   1. Interval changes of left frontoparietal craniotomy with surgical clips along the left scalp.  Moderate amount of postprocedural pneumocephalus most pronounced along left cerebral convexity.  Mixed attenuation extra-axial collection containing fluid and air along the left cerebral convexity measuring up to 16 mm in thickness.  There is left-to-right midline shift of approximately 6 mm.  2. Interval partial evacuation of parenchymal hematoma in the left frontal parietal region the largest residual component measures approximately 1.4 x 0.5 x 1.6 cm, previously 4.0 x 3.2 x 5.0 cm.  A 2nd small residual component measures approximately 0.8  x 0.8 x 1.1 cm.  Small amount of edema and air attenuation noted along the operative bed.  Clinical correlation and continued follow-up is suggested.  A preliminary report was provided by the [facility] service.  LOCATION:  [id number]   Dictated by (CST): Husam Martin MD on 3/22/2024 at 4:59 AM     Finalized by (CST): Husam Martin MD on 3/22/2024 at 5:03 AM       US INTRAOPERATIVE GUIDANCE (CPT=76998)    Result Date: 3/22/2024  CONCLUSION:  Please see the procedure/operative report for further details.    LOCATION:  [facility]    Dictated by (CST): Stromberg, LeRoy, MD on 3/22/2024 at 0:06 AM     Finalized by (CST): Stromberg, LeRoy, MD on 3/22/2024 at 0:07 AM       CT BRAIN OR HEAD ([id number])    Result Date: 3/21/2024  CONCLUSION:  Slight interval increase in size of left frontal region parenchymal hematoma with measurements given above.  There is mass effect related to this but no midline  shift.  Above findings were reported to IONA Abdi on March 21, 2024 at 3:32 p.m..    LOCATION:  [place]   Dictated by (CST): Jose Alberto Min MD on 3/21/2024 at 3:29 PM     Finalized by (CST): Jose Alberto Min MD on 3/21/2024 at 3:32 PM      Lab Review     Recent Labs   Lab 03/19/24 2354 03/21/24 0437 03/22/24  0406    140 141   K 4.0 3.5 3.3*    113* 116*   CO2 26.0 24.0 21.0   * 116* 164*   BUN 14 16 14   CREATSERUM 0.82 0.59 0.49*     Recent Labs   Lab 03/19/24 2354 03/21/24 0437 03/22/24  0406   WBC 9.2 7.6 10.0   HGB 13.0 12.4 11.5*   .0 292.0 290.0     Assesment/Plan:     Neuro:  L frontal ich-   Differential includes hypertensive vs vasc malformation vs mass vs hemorrhagic conversion of ischemic stroke vs amyloid.   Angio 3/21 neg vasc malformation, thus plan for mri/a/v brain w/wo for further eval.   S/p worsening mentation and crani for evac 3/21, postop per Neurosurg.  Cont neurochecks/pt/ot/st.  Cont keppra for seizure prophylaxis and cont vEEG.  Cardiac:  Hl- sbp goal 100-150  Pulmonary:  Resp failure- 2/2 above, vent management per pulm. OK to wean to extub from neuro standpoint  Renal:  IVFs  GI:  npo  Heme/ID:  Afebrile, no leukocytosis  Endocrine/Rheum:  Monitor glucose, sliding scale insulin prn  DVT Prophylaxis:  SCD’s    Goals of the Day: neurochecks, mri   A total of 80 minutes of critical care time (exclusive of billable procedures) was administered to manage and/or prevent neurologic instability. This involved direct patient intervention, complex decision making, and/or extensive discussions with the patient, family, and clinical staff.    Thank you for allowing me to participate in the care of this patient.     Nely Graham MD, [facility]  Medical Director of System Neurosciences  Chief, Section of Neurocritical Care  [facility]

## 2024-03-22 NOTE — ANESTHESIA PROCEDURE NOTES
Arterial Line    Performed by: Muriel Copeland MD  Authorized by: Muriel Copeland MD    General Information and Staff    Procedure Start:   Anesthesiologist:  Muriel Copeland MD  Performed By:  Anesthesiologist  Patient Location:  OR  Indication: continuous blood pressure monitoring and blood sampling needed    Site Identification: surface landmarks    Preanesthetic Checklist: 2 patient identifiers, IV checked, risks and benefits discussed, monitors and equipment checked, pre-op evaluation, timeout performed, anesthesia consent and sterile technique used    Procedure Details    Catheter Size:  20 G  Catheter Length:  1 and 3/4 inch  Catheter Type:  Arrow  Seldinger Technique?: No    Laterality:  Left  Site:  Radial artery  Site Prep: chlorhexidine    Line Secured:  Wrist Brace, tape and Tegaderm    Assessment    Events: patient tolerated procedure well with no complications      Medications      Additional Comments         Never smoker

## 2024-03-23 ENCOUNTER — APPOINTMENT (OUTPATIENT)
Dept: MRI IMAGING | Facility: HOSPITAL | Age: 64
End: 2024-03-23
Attending: INTERNAL MEDICINE
Payer: MEDICARE

## 2024-03-23 ENCOUNTER — APPOINTMENT (OUTPATIENT)
Dept: GENERAL RADIOLOGY | Facility: HOSPITAL | Age: 64
End: 2024-03-23
Attending: INTERNAL MEDICINE
Payer: MEDICARE

## 2024-03-23 PROBLEM — G93.40 ENCEPHALOPATHY: Status: ACTIVE | Noted: 2024-03-23

## 2024-03-23 LAB
GLUCOSE BLD-MCNC: 110 MG/DL (ref 70–99)
GLUCOSE BLD-MCNC: 110 MG/DL (ref 70–99)
GLUCOSE BLD-MCNC: 116 MG/DL (ref 70–99)
GLUCOSE BLD-MCNC: 123 MG/DL (ref 70–99)
POTASSIUM SERPL-SCNC: 3.5 MMOL/L (ref 3.5–5.1)

## 2024-03-23 PROCEDURE — 70546 MR ANGIOGRAPH HEAD W/O&W/DYE: CPT | Performed by: INTERNAL MEDICINE

## 2024-03-23 PROCEDURE — 99232 SBSQ HOSP IP/OBS MODERATE 35: CPT | Performed by: INTERNAL MEDICINE

## 2024-03-23 PROCEDURE — 71045 X-RAY EXAM CHEST 1 VIEW: CPT | Performed by: INTERNAL MEDICINE

## 2024-03-23 PROCEDURE — 70544 MR ANGIOGRAPHY HEAD W/O DYE: CPT | Performed by: INTERNAL MEDICINE

## 2024-03-23 PROCEDURE — 99291 CRITICAL CARE FIRST HOUR: CPT | Performed by: INTERNAL MEDICINE

## 2024-03-23 PROCEDURE — 99233 SBSQ HOSP IP/OBS HIGH 50: CPT | Performed by: INTERNAL MEDICINE

## 2024-03-23 PROCEDURE — 95720 EEG PHY/QHP EA INCR W/VEEG: CPT | Performed by: OTHER

## 2024-03-23 PROCEDURE — 70553 MRI BRAIN STEM W/O & W/DYE: CPT | Performed by: INTERNAL MEDICINE

## 2024-03-23 RX ORDER — LEVETIRACETAM 500 MG/5ML
1500 INJECTION, SOLUTION, CONCENTRATE INTRAVENOUS EVERY 12 HOURS
Status: DISCONTINUED | OUTPATIENT
Start: 2024-03-24 | End: 2024-03-29

## 2024-03-23 RX ORDER — GADOTERATE MEGLUMINE 376.9 MG/ML
15 INJECTION INTRAVENOUS
Status: COMPLETED | OUTPATIENT
Start: 2024-03-23 | End: 2024-03-23

## 2024-03-23 RX ORDER — IPRATROPIUM BROMIDE AND ALBUTEROL SULFATE 2.5; .5 MG/3ML; MG/3ML
3 SOLUTION RESPIRATORY (INHALATION)
Status: DISCONTINUED | OUTPATIENT
Start: 2024-03-23 | End: 2024-03-25

## 2024-03-23 NOTE — PROCEDURES
LONG-TERM VIDEO EEG REPORT;    Reason for Examination: SDH/ Seizures    Technical Summary:   18 Channels of EEG and 1 Channel of EKG was performed utilizing internation 10/20 method. Motion, muscle and machine artifacts were noted. Recoding was interrupted for three hours for patient's surgery.      Recording Start date/time: 03/21 at 1743  Recording end date/time: 03/22 at 1743      Background Activity:   The background activity consisted of 9 Hz waveforms, reactive to eye opening/ external stimulation.    Abnormality:  Rare intermittent bilateral independent temporal sharp wave activity with phase reversal at T3 and T4 was noted.  Mild intermittent 1-3 hz diffuse slow wave activity lasting upto 10 seconds were noted. These waveforms were not associated with any clinical manifestations.  Intermittent beta activity was also noted.       Activation:    Hyperventilation:   Not Performed.    Photic Stimulation:  Driving response seen.No       Sleep:  Stage I and II sleep seen.    Impression:  This is a Abnormal prolonged Video EEG study.   Bilateral independent temporal sharp wave activity was noted. These waveforms are epileptogenic in nature. No electrographic seizures were noted.   Intermittent slow wave activity in delta range was also noted.  These findings can be seen in encephalopathy due to metabolic/toxic etiology, medication effects or diffuse cerebral injury.    Intermittent beta activity was also seen, most likely due to medication effect.  Clinical correlation is recommended.        Gustavo Skinner MD  [facility].

## 2024-03-23 NOTE — PROGRESS NOTES
[facility] Pulmonary Progress Note    Leanne Ching Patient Status:  Inpatient    1960 MRN [id number]   [facility] 6NE-A Attending Adelina Morirs MD   Hosp Day # 3 PCP Shelby Frost MD     Subjective:  Leanne Ching is a(n) 63 year old female who is admitted for ICH.    Overnight: extubated yesterday without incident, seen today on room air    Objective:  /76   Pulse 78   Temp 97.8 °F (36.6 °C) (Temporal)   Resp 22   Ht 5' 5\" (1.651 m)   Wt 137 lb 12.6 oz (62.5 kg)   SpO2 98%   BMI 22.93 kg/m²       Temp (24hrs), Av.8 °F (36.6 °C), Min:96.9 °F (36.1 °C), Max:98.2 °F (36.8 °C)      Intake/Output:    Intake/Output Summary (Last 24 hours) at 3/23/2024 0943  Last data filed at 3/23/2024 0800  Gross per 24 hour   Intake 2645 ml   Output 3125 ml   Net -480 ml       Physical Exam:   General: alert, cooperative, sleepy, but easily arousable   Head: Normocephalic, head bandaged, I did not examine head   Throat: Lips, mucosa, and tongue normal.  No thrush noted.   Neck: trachea midline, no adenopathy, no thyromegaly. No JVD.   Lungs: clear to auscultation bilaterally   Chest wall: No tenderness or deformity.   Heart: regular rate and rhythm, S1, S2 normal, no murmur, click, rub or gallop   Abdomen: soft, non-distended, no masses, no guarding, no     Rebound.   Extremity: No edema or cyanosis   Skin: No rashes or lesions.   Neurological: Alert, interactive, no focal deficits    Lab Data Review:  Recent Labs     24  0437 24  0406   WBC 7.6 10.0   HGB 12.4 11.5*   .0 290.0     Recent Labs     24  0437 24  0406 24  0501    141  --    K 3.5 3.3* 3.5   * 116*  --    CO2 24.0 21.0  --    BUN 16 14  --    CREATSERUM 0.59 0.49*  --      Recent Labs   Lab 24  2354   PTP 12.5   INR 0.89   PTT 26.5       Cultures:   No results found for this visit on 24.    Radiology:  CT BRAIN OR HEAD ([id number])  Narrative: PROCEDURE:  CT BRAIN OR HEAD ([id number])      COMPARISON:  [facility] , CT, CT BRAIN OR HEAD ([id number]), 3/22/2024, 0:06 AM.     INDICATIONS:  IPH     TECHNIQUE:  Noncontrast CT scanning is performed through the brain. Dose reduction techniques were used. Dose information is transmitted to the ACR (American College of Radiology) NRDR (National Radiology Data Registry) which includes the Dose Index   Registry.     PATIENT STATED HISTORY: (As transcribed by Technologist)  Post craniotomy for subdural hematoma evacuation.       FINDINGS:  Postoperative changes from left frontoparietal craniotomy with surgical clips along the left scalp are again noted.  There is partial redistribution of the previously visualized postprocedural pneumocephalus most pronounced along the anterior cerebral   convexities bilaterally.  Mixed attenuation extra-axial collection containing fluid, blood, and air along the left cerebral convexity measuring up to 13 mm in thickness, previously 16 mm.  Decreased left-to-right midline shift of approximately 3 mm,   previously 6 mm.      Redemonstration of changes related to partial evacuation of parenchymal hematoma in the left frontal parietal region the largest residual component again measures approximately 1.4 x 0.5 x 1.6 cm.  A 2nd stable small residual component measures   approximately 0.8 x 0.8 x 1.1 cm.  Small amount of edema and air attenuation noted along the operative bed.  Clinical correlation and continued follow-up is suggested.      There is no hydrocephalus.  The basal cisterns are patent.  The gray-white matter differentiation is intact.      The visualized paranasal sinuses and mastoid air cells are unremarkable.                       Impression: CONCLUSION:       1. Postoperative changes from left frontoparietal craniotomy with surgical clips along the left scalp are again noted.  There is partial redistribution of the previously visualized postprocedural pneumocephalus most pronounced along the anterior cerebral   convexities  bilaterally.  Mixed attenuation extra-axial collection containing fluid, blood, and air along the left cerebral convexity measuring up to 13 mm in thickness, previously 16 mm.  Decreased left-to-right midline shift of approximately 3 mm,   previously 6 mm.      2. Redemonstration of changes related to partial evacuation of parenchymal hematoma in the left frontal parietal region the largest residual component again measures approximately 1.4 x 0.5 x 1.6 cm.  A 2nd stable small residual component measures   approximately 0.8 x 0.8 x 1.1 cm.  Small amount of edema and air attenuation noted along the operative bed.  Clinical correlation and continued follow-up is suggested.      LOCATION:  [facility]        Dictated by (CST): Husam Martin MD on 3/22/2024 at 6:22 AM       Finalized by (CST): Husam Martin MD on 3/22/2024 at 6:28 AM     XR CHEST AP PORTABLE  (CPT=71045)  Narrative: PROCEDURE:  XR CHEST AP PORTABLE  (CPT=71045)     TECHNIQUE:  AP chest radiograph was obtained.     COMPARISON:  None.     INDICATIONS:  ETT and OGT placement check     PATIENT STATED HISTORY: (As transcribed by Technologist)           FINDINGS:  Endotracheal tube tip below thoracic inlet with the tip positioned 2.6 cm from the jakub.  OG tube tip in the stomach.  Borderline heart size with normal pulmonary vascularity.  No pleural effusion or pneumothorax.  No lobar consolidation.                   Impression: CONCLUSION:  Endotracheal tube tip below thoracic inlet with the tip positioned 2.6 cm from the jakub.  OG tube tip in the stomach     A preliminary report was provided by the [facility] teleradiology service.     LOCATION:  [facility]                 Dictated by (CST): Husam Martin MD on 3/22/2024 at 5:47 AM       Finalized by (CST): Husam Martin MD on 3/22/2024 at 5:48 AM     CT BRAIN OR HEAD ([id number])  Narrative: PROCEDURE:  CT BRAIN OR HEAD ([id number])     COMPARISON:  [facility] , CT, CT BRAIN OR HEAD ([id number]), 3/21/2024, 2:55 PM.      INDICATIONS:  Post-craniotomy; dual-energy required     TECHNIQUE:  Noncontrast CT scanning is performed through the brain. Dose reduction techniques were used. Dose information is transmitted to the ACR (American College of Radiology) NRDR (National Radiology Data Registry) which includes the Dose Index   Registry.     PATIENT STATED HISTORY: (As transcribed by Technologist)  Post craniotomy for subdural hematoma evacuation.      FINDINGS:  Interval changes of left frontoparietal craniotomy with surgical clips along the left scalp.  Moderate amount of postprocedural pneumocephalus most pronounced along left cerebral convexity.  Mixed attenuation extra-axial collection containing fluid and   air along the left cerebral convexity measuring up to 16 mm in thickness.  There is left-to-right midline shift of approximately 6 mm.      Interval partial evacuation of parenchymal hematoma in the left frontal parietal region the largest residual component measures approximately 1.4 x 0.5 x 1.6 cm, previously 4.0 x 3.2 x 5.0 cm.  A 2nd small residual component measures approximately 0.8 x   0.8 x 1.1 cm.  Small amount of edema and air attenuation noted along the operative bed.  Clinical correlation and continued follow-up is suggested.     There is no hydrocephalus.  The basal cisterns are patent.  The gray-white matter differentiation is intact.     The visualized paranasal sinuses and mastoid air cells are unremarkable.                      Impression: CONCLUSION:       1. Interval changes of left frontoparietal craniotomy with surgical clips along the left scalp.  Moderate amount of postprocedural pneumocephalus most pronounced along left cerebral convexity.  Mixed attenuation extra-axial collection containing fluid   and air along the left cerebral convexity measuring up to 16 mm in thickness.  There is left-to-right midline shift of approximately 6 mm.      2. Interval partial evacuation of parenchymal hematoma in the  left frontal parietal region the largest residual component measures approximately 1.4 x 0.5 x 1.6 cm, previously 4.0 x 3.2 x 5.0 cm.  A 2nd small residual component measures approximately 0.8   x 0.8 x 1.1 cm.  Small amount of edema and air attenuation noted along the operative bed.  Clinical correlation and continued follow-up is suggested.     A preliminary report was provided by the [facility]eradiology service.     LOCATION:  [id number]        Dictated by (CST): Husam Martin MD on 3/22/2024 at 4:59 AM       Finalized by (CST): Husam Martin MD on 3/22/2024 at 5:03 AM     US INTRAOPERATIVE GUIDANCE (CPT=76998)  Narrative: PROCEDURE:  US INTRAOPERATIVE GUIDANCE (CPT=76998)     COMPARISON:  None.     INDICATIONS:  craniotomy     TECHNIQUE:    Intraoperative ultrasound guidance was provided.  Selected images were obtained by the sonographer assisting in the procedure.     PATIENT STATED HISTORY: (As transcribed by Technologist)           Tech Time:  1 hour  Performing Doctor:  Dr. Romero   Area of Interest:  Brain     FINDINGS:  Sonographic imaging of the brain was provided during a craniotomy.                   Impression: CONCLUSION:  Please see the procedure/operative report for further details.           LOCATION:  [facility]           Dictated by (CST): Stromberg, LeRoy, MD on 3/22/2024 at 0:06 AM       Finalized by (CST): Stromberg, LeRoy, MD on 3/22/2024 at 0:07 AM         Medications reviewed     Assessment and Plan:   Patient Active Problem List   Diagnosis    Diverticulosis large intestine w/o perforation or abscess w/o bleeding    Internal hemorrhoids    Intraparenchymal hematoma of brain, left, without loss of consciousness, initial encounter (HCC)    Hypertensive emergency    Intracranial bleed (HCC)    Nontraumatic cortical hemorrhage of left cerebral hemisphere (HCC)    Hypertension    Vasogenic brain edema (HCC)    Hematoma    Encephalopathy       Assessment:  Acute respiratory failure in the  setting of left frontal intraparenchymal hematoma status post evacuation, extubated now on room air this morning  Coughing and sore throat post extubation, without stridor  Left frontal intraparenchymal hematoma status post craniotomy on 3/21           Plan:  Patient now on room air  Schedule duonebs to help with bronchial hygiene  If still coughing with fevers and oxygen saturations start to drop then check CXR  When tolerating PO, sore throat lozenges to help post extubation  Postop management per neurosurgery and neurocritical care  MRI pending  [facility]  Holding heparin due to hematoma, resume when ok with neurosurgery  Family updated at bedside  Pulmonary will follow peripherally as patient is now stable on room air, if conditions change, will be immediately available    More than 40 minutes was spent evaluating chart/record review, labs, imaging, and discussing plan of care with patient and family    Luis Waller MD  [facility]  Office: [phone]

## 2024-03-23 NOTE — PROGRESS NOTES
[facility]   part of [facility]     Hospitalist Progress Note     Leanne Ching Patient Status:  Inpatient    1960 MRN [id number]   Location [facility] 6NE-A Attending Adelina Morris MD   Hosp Day # 3 PCP Shelby Frost MD     Chief Complaint: ICH    Subjective:     Patient extubated. Drowsy but following commands.     Objective:    Review of Systems:   A comprehensive review of systems was completed; pertinent positive and negatives stated in subjective.    Vital signs:  Temp:  [96.9 °F (36.1 °C)-98.2 °F (36.8 °C)] 97.8 °F (36.6 °C)  Pulse:  [76-99] 78  Resp:  [13-22] 22  BP: (119-136)/(62-79) 135/76  SpO2:  [97 %-100 %] 98 %  AO: (116-140)/(51-82) 140/68  FiO2 (%):  [35 %] 35 %    Physical Exam:    General: No acute distress  Respiratory: No wheezes, no rhonchi  Cardiovascular: S1, S2, regular rate and rhythm  Abdomen: Soft, Non-tender, non-distended, positive bowel sounds  Neuro: unable to move right side. PERRL  Extremities: No edema      Diagnostic Data:    Labs:  Recent Labs   Lab 247 24  0406   WBC 9.2 7.6 10.0   HGB 13.0 12.4 11.5*   MCV 90.0 89.2 88.9   .0 292.0 290.0   INR 0.89  --   --        Recent Labs   Lab 247 24  0406 24  0501   * 116* 164*  --    BUN 14 16 14  --    CREATSERUM 0.82 0.59 0.49*  --    CA 10.2 8.7 8.1*  --    ALB 4.9*  --   --   --     140 141  --    K 4.0 3.5 3.3* 3.5    113* 116*  --    CO2 26.0 24.0 21.0  --    ALKPHO 66  --   --   --    AST 25  --   --   --    ALT 15  --   --   --    BILT 0.5  --   --   --    TP 7.9  --   --   --        Estimated Creatinine Clearance: 103 mL/min (A) (based on SCr of 0.49 mg/dL (L)).    No results for input(s): \"TROP\", \"TROPHS\", \"CK\" in the last 168 hours.    Recent Labs   Lab 24  2354   PTP 12.5   INR 0.89              icrobiology    No results found for this visit on 24.      Imaging: Reviewed in Epic.    Medications:     ipratropium-albuterol  3 mL Nebulization Q6H WA    chlorhexidine gluconate  15 mL Mouth/Throat BID@0800,2000    insulin aspart  1-5 Units Subcutaneous 4 times per day    levETIRAcetam  1,000 mg Intravenous Q12H       Assessment & Plan:      # Left frontal intraparenchymal hematoma sp craniotomy 3/21/24  -Keppra for seizure ppx  - Close monitoring in neuro ICU  -Cerebral angiogram without AV malformation or fistula  -MRI brain pending   - neurosurgery on consult  -continue ICH protocol, BP goals per neuroCC      # Pneumocephalus,   -Noted on imaging.   -Dw Dr Kolb-> O2 recommended     # Vasogenic edema  - mild     # Prediabetes  - hyperglycemia protocol   - ISS  -HgbA1c 6.0.     D/w spouse, children at bedside.     Adelina Morris MD    Supplementary Documentation:     Quality:  DVT Mechanical Prophylaxis:   SCDs,    DVT Pharmacologic Prophylaxis   Medication   None                Code Status: Full Code  Miles: Miles catheter in place  Miles Duration (in days): 2  Central line:    LISA:     Discharge is dependent on: course  At this point Ms. Ching is expected to be discharge to: rehab     The 21st Century Cures Act makes medical notes like these available to patients in the interest of transparency. Please be advised this is a medical document. Medical documents are intended to carry relevant information, facts as evident, and the clinical opinion of the practitioner. The medical note is intended as peer to peer communication and may appear blunt or direct. It is written in medical language and may contain abbreviations or verbiage that are unfamiliar.

## 2024-03-23 NOTE — SLP NOTE
ADULT SWALLOWING EVALUATION    ASSESSMENT      PERTINENT BACKGROUND INFORMATION:  Patient is a 63 year-old female who was admitted on 3/20/24 with ICH (intracerebral hemorrhage) (HCC) [I61.9].  Left craniotomy for hematoma evacuation completed on 3/21/22. She was just extubated yesterday 3/22/24. Imaging results reviewed and noted below.  Currently on  RA adequate SPO2 saturations of 100%. PMH is significant for HTN which may impact care. Patient resides at home with spouse.   Prior to this hospitalization, patient was consuming a regular diet with thin liquids. Patient unable to verbalize complaints due to aphasia.  Failed RN swallow screening. Has seen SLP at [facility] 3/20 before transfer to [facility]. Patient is currently NPO. Continuous EEG monitoring present.    ASSESSMENT:   Pt evaluated in the neuro ICU. Patient was lethargic and needed frequent cues to maintain alertness.  Able to maintain midline position with adequate trunk and head control when upright in bed in chair mode. Oral motor mechanism exam was completed; see objective section below. Unproductive protective cough/throat clear persists. Patient presented with an oropharyngeal dysphagia with absent signs/symptoms of aspiration during the controlled conditions of this exam. Marked oral residue with puree on R side which needed to be evacuated with swab. R side anterior leakage. Cannot r/o silent aspiration. Not ready for PO due to current lethargy and verbal/tactile stimulation to maintain alertness for participation, oral motor deficits, generalized weakness and decreased endurance.  Please see below objective section for details. Family members present were trained in oral care and dysphagia exercise- effortful swallow. All questions answered to their apparent satisfaction.      RECOMMENDATIONS/PLAN:  -  Recommend NPO. Consider alternative feeding route (NG tube).    -  Repeat bedside clinical swallow exam to determine readiness for VFSS.  -  Speech therapy services are recommended daily for dysphagia exercise training and speech/language evaluation and treatment to address aphasia, VFSS when clinically appropriate.  -  Continue patient/family education and training in dysphagia HEP, oral care  -  Oral care 2-3x/day and suction PRN      EDUCATION:  - Patient/family verbalized understanding to results and recommendations of this evaluation and was in agreement.   - Spoke with IONA Huitron.          RECOMMENDATIONS   Diet Recommendations - Solids: NPO  Diet Recommendations - Liquids: NPO                        Compensatory Strategies Recommended:  (.)  Aspiration Precautions:  (.)  Medication Administration Recommendations: Non-oral  Treatment Plan/Recommendations: Communication evaluation;Dysphagia therapy;SLP to reassess    HISTORY   MEDICAL HISTORY  Reason for Referral: Stroke protocol    Problem List  Principal Problem:    Nontraumatic cortical hemorrhage of left cerebral hemisphere (HCC)  Active Problems:    Hypertensive emergency    Intracranial bleed (HCC)    Hypertension    Vasogenic brain edema (HCC)    Hematoma    Encephalopathy      Past Medical History  Past Medical History:   Diagnosis Date    Diverticulosis large intestine w/o perforation or abscess w/o bleeding 2018    High cholesterol     Hyperlipidemia     Hypertension 3/21/2024    Internal hemorrhoids 2018       Past Surgical History:   Procedure Laterality Date    ABLATION      COLONOSCOPY N/A 2018    Procedure: COLONOSCOPY;  Surgeon: Jacinda Cruz MD;  Location: [facility] ENDOSCOPY          x 2       Prior Living Situation: Home with spouse  Diet Prior to Admission: Regular;Thin liquids  Precautions: Seizure;Aspiration    Patient/Family Goals: go to intensive rehab    SWALLOWING HISTORY  Current Diet Consistency: NPO  Dysphagia History: none    IMAGING  CT BRAIN OR HEAD   CONCLUSION:       1. Postoperative changes from left frontoparietal craniotomy with  surgical clips along the left scalp are again noted.  There is partial redistribution of the previously visualized postprocedural pneumocephalus most pronounced along the anterior cerebral    convexities bilaterally.  Mixed attenuation extra-axial collection containing fluid, blood, and air along the left cerebral convexity measuring up to 13 mm in thickness, previously 16 mm.  Decreased left-to-right midline shift of approximately 3 mm,   previously 6 mm.      2. Redemonstration of changes related to partial evacuation of parenchymal hematoma in the left frontal parietal region the largest residual component again measures approximately 1.4 x 0.5 x 1.6 cm.  A 2nd stable small residual component measures   approximately 0.8 x 0.8 x 1.1 cm.  Small amount of edema and air attenuation noted along the operative bed.  Clinical correlation and continued follow-up is suggested.      LOCATION:  [facility]      Dictated by (CST): Husam Martin MD on 3/22/2024 at 6:22 AM       Finalized by (CST): Husam Martin MD on 3/22/2024 at 6:28 AM         XR CHEST AP PORTABLE   Impression   CONCLUSION:  Endotracheal tube tip below thoracic inlet with the tip positioned 2.6 cm from the jakub.  OG tube tip in the stomach     A preliminary report was provided by the [facility] service.     LOCATION:  [facility]        Dictated by (CST): Husam Martin MD on 3/22/2024 at 5:47 AM      Finalized by (CST): Husam Martin MD on 3/22/2024 at 5:48 AM         SUBJECTIVE   Family members present (spouse, son and daughter).      OBJECTIVE   ORAL MOTOR EXAMINATION  Dentition: Natural;Functional  Symmetry: Reduced right facial  Strength:  (difficulty following complex directives to assess)     Range of Motion: Reduced right facial;Reduced right lingual;Reduced left lingual  Rate of Motion: Reduced    Voice Quality: Weak;Hoarse  Respiratory Status: Unlabored  Consistencies Trialed: Nectar thick liquids;Honey thick liquids;Puree  Method of  Presentation: Staff/Clinician assistance;Spoon  Patient Positioning: Upright;Midline (bed positioned to chair mode)    Oral Phase of Swallow: Impaired  Bolus Retrieval: Intact  Bilabial Seal: Impaired  Bolus Formation: Impaired  Bolus Propulsion: Impaired  Mastication:  (DNT)  Retention: Impaired    Pharyngeal Phase of Swallow: Impaired  Laryngeal Elevation Timing: Appears impaired  Laryngeal Elevation Strength: Appears impaired  Laryngeal Elevation Coordination: Appears intact (1:1 swallow ratio palpated)  (Please note: Silent aspiration cannot be evaluated clinically. Videofluoroscopic Swallow Study is required to rule-out silent aspiration.)    Esophageal Phase of Swallow: No complaints consistent with possible esophageal involvement  Comments:               GOALS  Goal #1 Swallow re-evaluation  Goal Established         Goal #2 Patient will perform OME 80% acc with mod cues.    Goal Established         Goal #3 Patient will perform BOT and LE 80% acc with mod cues  Goal Established         Goal #4 VFSS when clinically appropriate    Goal Established         Goal #5 Speech/language evaluation when more alert    Goal Established           FOLLOW UP  Treatment Plan/Recommendations: Communication evaluation;Dysphagia therapy;SLP to reassess  Number of Visits to Meet Established Goals: 5  Follow Up Needed (Documentation Required): Yes  SLP Follow-up Date: 03/24/24    Thank you for your referral.   If you have any questions, please contact TERE Connell

## 2024-03-23 NOTE — PLAN OF CARE
Neuros q2h. Pt repositioned q2h. Pt on room air.     Pt lethargic but arousable. Pt withdraws on the right upper extremity  pain. Pt is 4/5 on the left upper extremity and follows commands. Left lower extremity patient is 4/5 and follows commands. Pt can lift up the right leg and move only so slightly to command. Pt nods head and is oriented x3. Pt oriented to self, place and time. Pt is able to say name- othrwise the rest of the assessment she nods appropriately.     Pt had MRI done. Pt BP goal . Pt on continuous EEG    Pt had dinero removed. Pt had a dobhoff placed. Awaiting chestxray for confirmation.     Speech therapy came to assess patient and patient remained NPO.     Medications given as ordered.     See flowsheets for assessments and plan.

## 2024-03-23 NOTE — PROGRESS NOTES
[facility]  Neurosurgery Progress Note    Leanne Ching Patient Status:  Inpatient    1960 MRN [id number]   Location [facility] 6NE-A Attending Adelina Morris MD   Hosp Day # 3 PCP Shelby Frost MD     PRINCIPLE PROBLEM:   Left frontoparietal ICH s/p craniotomy for evacuation POD #1    SUBJECTIVE     Pt seen and examined, was successfully extubated yesterday to noon drowsy. Drowsy but regards, wakes to name, and follows commands. Nods appropriately in response to questions. Able to vocalize \"good\" when asked how she is feeling.  vEEG in progress.  No acute events overnight.  Daughter at bedside.     OBJECTIVE/PHYSICAL EXAM     VITALS:  /77 (BP Location: Left arm)   Pulse 78   Temp 97.8 °F (36.6 °C) (Temporal)   Resp 22   Ht 65\"   Wt 137 lb 12.6 oz (62.5 kg)   SpO2 98%   BMI 22.93 kg/m²     GENERAL: No acute distress, non-toxic appearing    HEENT: Normocephalic, atraumatic    RESP:  Respirations non-labored, even    CV:  NSR on tele    NEURO: As needed wakes to name.  Able to follow simple commands.  Nods appropriately responds to questions.  Slight right facial droop.  PERRLA +3 brisk, EOMI. Sensation to light touch is intact bilaterally.  Able to move LUE/LLE spontaneously and to command.  Not following commands with RUE/RLE, no grasp to command. Gait deferred.     SKIN: Unable to visualize surgical incision d/t dressing for EEG    LABS     Lab Results   Component Value Date    K 3.5 2024    PGLU 110 2024       IMAGING     No new imaging to review    ASSESSMENT & PLAN     ASSESSMENT:  Left frontoparietal ICH s/p craniotomy for evacuation POD #1    PLAN:  Continue present management   Keep SBP <140  Critical care management and Keppra per NeuroCC  PT/OT/ST when appropriate   DVT prophylaxis - SCDs    Dr. Kolb to follow.    Amy Barksdale, APRLUCIANO-NP  [facility]  3/23/2024, 8:20 AM

## 2024-03-23 NOTE — PLAN OF CARE
Assumed care @ 1930. Patient is drowsy. Opens eyes to speech. Follow command. Non verbal, nods appropriately. On tele-NSR. On RA. SBP parameters , wtihin parameters on own. Neuro checks Q1H with pupillometer, see flowsheet. Art line/dinero in place. Accuchecks. IVF. EEG on. Pt still needs MRI. Call light within reach. Family at bedside. All needs met at this time.

## 2024-03-23 NOTE — DIETARY NOTE
[facility]   part of [facility]    NUTRITION ASSESSMENT    Unable to diagnose malnutrition criteria at this time.      NUTRITION INTERVENTION:    Meal and Snacks - Monitor and encourage adequate PO intake. NPO per SLP (3/23).  Medical Food Supplements - Will evaluate need when diet is advanced.  Coordination of Nutrition Care - SLP consult prior to diet advancement.    Enteral Nutrition - via DHT    *Initiate Jevity 1.5 at 10 ml/hr. Increase 10 ml q 6 hours, as tolerated, to goal rate of 50 ml/hr.    Free water flush per MD: 150 ml q 4 hours.  Goal TF to provide: 1200 ml, 1800 kcal, 77 gm protein, 912 ml free H20, Total H20 (TF+free water flush)=1812 ml, and 100% of RDI's.    PATIENT STATUS:     3/23/24- 64 y/o female admitted with slurred speech and RUE weakness. Found to have L frontal intraparenchymal hematoma. S/p craniotomy on 3/21. Extubated on 3/22. NPO x 3 days.   Received nutrition consult for TF recs. DHT placed per RN report. See above for TF recs. Noted SLP eval today with diet recs for NPO.   PMH:HTN, HLD, diverticulosis.       ANTHROPOMETRICS:  Ht: 165.1 cm (5' 5\")  Wt: 62.5 kg (137 lb 12.6 oz).   BMI: Body mass index is 22.93 kg/m².  IBW: 56.8 kg      WEIGHT HISTORY:   Weight loss: No    Wts vary this admit:126 lb-137 lb.    Wt Readings from Last 10 Encounters:   03/23/24 62.5 kg (137 lb 12.6 oz)   03/20/24 57.4 kg (126 lb 8.7 oz)   05/17/18 71.7 kg (158 lb)   05/09/18 71.7 kg (158 lb)        NUTRITION:  Diet: NPO + TF's     Food Allergies: No  Cultural/Ethnic/Pentecostal Preferences Addressed: Yes    Percent Meals Eaten (last 3 days)       None            GI system review:  Last BM:unknown    Skin and wounds: no pressure ulcers per RN documentation. Surgical incision on L head, s/p craniotomy on 3/21.    NUTRITION RELATED PHYSICAL FINDINGS:     1. Body Fat/Muscle Mass:  REBECA at this time     2. Fluid Accumulation: none per RN documentation     NUTRITION PRESCRIPTION: 62.5 kg-137 lb  (3/23)  Calories: 3973-7271 calories/day ( 27-32 calories per kg )  Protein: 75-94 grams protein/day (1.2-1.5 grams protein per kg)  Fluid: ~1 ml/kcal or per MD discretion    NUTRITION DIAGNOSIS/PROBLEM:  Inadequate energy intake related to  physiologic causes increasing nutrient needs and decreased ability to consume sufficient energy intakes  as evidenced by  NPO x 3 days and DHT placed for TF's.    Increased nutrient needs related to wound healing as evidenced by surgical incision on L head, s/p craniotomy on 3/21.    MONITOR AND EVALUATE/NUTRITION GOALS:  Weight stable within 1 to 2 lbs during admission - New  Provide nutrition adequate for wound healing - New  Start alternative nutrition in 24-48 hrs if diet is not able to advance- New  Tolerate alternative nutrition at 100% of goal - New      MEDICATIONS:  IVF:NS at 75 ml/hr, Insulin, Keppra    LABS:  POC Glu:110-163, Na++:141, K+:3.5    Pt is at High nutrition risk    Angela Crane MS, RD, LDN  Clinical Dietitian  Ext:[phone]

## 2024-03-23 NOTE — PROGRESS NOTES
[facility]  Neurocritical Care Progress Note     Leanne Ching Patient Status:  Inpatient    1960 MRN [id number]   Location [facility] 6NE-A Attending Adelina Morris MD   Hosp Day # 3 PCP Shelby Frost MD     Subjective:   Patient is a 63 year old female h/o hl p/w L frontal ich 3/19   Hospital course significant for onset of R sided weakness and slurred speech thus went to [facility] ED where w/u revealed SBP>190 and ct head with 3.6x3.6x4.8cm L frontal ich with mass effect and 3mm midline shift, and cta c/f underlying vasc malformation , angio neg 3/21, worsening mentation post angio w/R facial twitching but no eeg correlate, and decision made to proceed with crani for evac 3/21, extubated 3/22    Extubated uneventfully yesterday.    Vitals:   Temp:  [96.9 °F (36.1 °C)-98.2 °F (36.8 °C)] 97.8 °F (36.6 °C)  Pulse:  [76-99] 94  Resp:  [13-22] 18  BP: (119-136)/(62-79) 125/76  SpO2:  [97 %-100 %] 100 %  AO: (116-140)/(51-82) 131/64  FiO2 (%):  [35 %] 35 %  Body mass index is 22.93 kg/m².    Intake/Output:    Intake/Output Summary (Last 24 hours) at 3/23/2024 1010  Last data filed at 3/23/2024 1000  Gross per 24 hour   Intake 2645 ml   Output 3075 ml   Net -430 ml     Current Meds:      Physical Examination:   General- NAD  CV- RRR  Resp- dec breath sounds bilat  Neuro-  Mental status- opens eyes to voice, regards and follows most commands, speech minimal and slow  Cranial nerves- pupils equally round and reactive to light, +corneal  Motor/Sens- moves L spont, RUE 0/5, RLE 2/5    Diagnostics:   No results found.  Lab Review     Recent Labs   Lab 24  2354 24  0437 24  0406 24  0501    140 141  --    K 4.0 3.5 3.3* 3.5    113* 116*  --    CO2 26.0 24.0 21.0  --    * 116* 164*  --    BUN 14 16 14  --    CREATSERUM 0.82 0.59 0.49*  --      Recent Labs   Lab 24  2354 24  0437 24  0406   WBC 9.2 7.6 10.0   HGB 13.0 12.4 11.5*   PLT  339.0 292.0 290.0     Assesment/Plan:     Neuro:  L frontal ich-   Differential includes hypertensive vs vasc malformation vs mass vs hemorrhagic conversion of ischemic stroke vs amyloid.   Angio 3/21 neg vasc malformation, thus plan for mri/a/v brain w/wo for further eval.   S/p worsening mentation and crani for evac 3/21, postop per Neurosurg.  Cont neurochecks/pt/ot/st.  Cont keppra for seizure prophylaxis for intermittent temp sharp waves, no seizures seen on eeg. Cont vEEG.  Cardiac:  Hl- sbp goal 100-150  Pulmonary:  NC for pneumocephalus per Neurosurg  Renal:  IVFs  GI:  Place DHT and start TF  Heme/ID:  Afebrile, no leukocytosis  Endocrine/Rheum:  Monitor glucose, sliding scale insulin prn  DVT Prophylaxis:  SCD’s    Goals of the Day: neurochecks, mri   A total of 40 minutes of critical care time (exclusive of billable procedures) was administered to manage and/or prevent neurologic instability. This involved direct patient intervention, complex decision making, and/or extensive discussions with the patient, family, and clinical staff.    Thank you for allowing me to participate in the care of this patient.     Nely Graham MD, [facility]  Medical Director of System Neurosciences  Chief, Section of Neurocritical Care  [facility]

## 2024-03-24 PROBLEM — E87.6 HYPOKALEMIA: Status: ACTIVE | Noted: 2024-03-24

## 2024-03-24 PROBLEM — R13.10 DYSPHAGIA: Status: ACTIVE | Noted: 2024-03-24

## 2024-03-24 LAB
ANION GAP SERPL CALC-SCNC: 4 MMOL/L (ref 0–18)
BUN BLD-MCNC: 19 MG/DL (ref 9–23)
CALCIUM BLD-MCNC: 8.4 MG/DL (ref 8.5–10.1)
CHLORIDE SERPL-SCNC: 113 MMOL/L (ref 98–112)
CO2 SERPL-SCNC: 24 MMOL/L (ref 21–32)
CREAT BLD-MCNC: 0.61 MG/DL
EGFRCR SERPLBLD CKD-EPI 2021: 99 ML/MIN/1.73M2 (ref 60–?)
GLUCOSE BLD-MCNC: 120 MG/DL (ref 70–99)
GLUCOSE BLD-MCNC: 121 MG/DL (ref 70–99)
GLUCOSE BLD-MCNC: 130 MG/DL (ref 70–99)
GLUCOSE BLD-MCNC: 137 MG/DL (ref 70–99)
GLUCOSE BLD-MCNC: 142 MG/DL (ref 70–99)
GLUCOSE BLD-MCNC: 144 MG/DL (ref 70–99)
MAGNESIUM SERPL-MCNC: 2.2 MG/DL (ref 1.6–2.6)
MRSA DNA SPEC QL NAA+PROBE: NEGATIVE
OSMOLALITY SERPL CALC.SUM OF ELEC: 297 MOSM/KG (ref 275–295)
PHOSPHATE SERPL-MCNC: 2.4 MG/DL (ref 2.5–4.9)
POTASSIUM SERPL-SCNC: 3.3 MMOL/L (ref 3.5–5.1)
SODIUM SERPL-SCNC: 141 MMOL/L (ref 136–145)

## 2024-03-24 PROCEDURE — 99232 SBSQ HOSP IP/OBS MODERATE 35: CPT | Performed by: INTERNAL MEDICINE

## 2024-03-24 PROCEDURE — 99291 CRITICAL CARE FIRST HOUR: CPT | Performed by: INTERNAL MEDICINE

## 2024-03-24 PROCEDURE — 95720 EEG PHY/QHP EA INCR W/VEEG: CPT | Performed by: OTHER

## 2024-03-24 RX ORDER — POTASSIUM CHLORIDE 14.9 MG/ML
20 INJECTION INTRAVENOUS ONCE
Qty: 100 ML | Refills: 0 | Status: COMPLETED | OUTPATIENT
Start: 2024-03-24 | End: 2024-03-24

## 2024-03-24 NOTE — PLAN OF CARE
Assumed care @ 1930. Patient is drowsy. Opens eyes to speech. Follow command. Expressive aphasia, nods appropriately. On tele-NSR. Received on RA, 4L O2 on 4 hours and off 4 hours. SBP parameters , hydralazine prn x1. Neuro checks Q2H, see flowsheet. Dobhoff in place-TF, advancing per order. Accuchecks. IVF. EEG on. Voiding, purewick in place. Call light within reach. Frequent rounding. All needs met at this time.

## 2024-03-24 NOTE — PROCEDURES
LONG-TERM VIDEO EEG REPORT;     Reason for Examination: SDH/ Seizures     Technical Summary:   18 Channels of EEG and 1 Channel of EKG was performed utilizing internation 10/20 method. Motion, muscle and machine artifacts were noted. Recoding was interrupted for three hours for patient's surgery.        Recording Start date/time: 03/22 at 1743  Recording end date/time: 03/23 at 1700        Background Activity:   The background activity consisted of 9 Hz waveforms, reactive to eye opening/ external stimulation.     Abnormality:  Rare intermittent bilateral independent temporal sharp wave activity with phase reversal at T3 and T4 was noted.  Mild intermittent 1-3 hz diffuse slow wave activity lasting upto 30 seconds were noted.  These waveforms were quite rhythmic, raising suspicions for electrographic seizures.  These waveforms were not associated with any clinical manifestations.        Activation:     Hyperventilation:   Not Performed.     Photic Stimulation:  Driving response seen.No        Sleep:  Stage I and II sleep seen.     Impression:  This is a Abnormal prolonged Video EEG study.   Bilateral independent temporal sharp wave activity was noted. These waveforms are epileptogenic in nature. No electrographic seizures were noted.   Intermittent slow wave activity in delta range was also noted.  At times these waveforms were quite rhythmic since raising suspicions for electrographic seizures.  No clinical manifestations were noted.  The overall frequency and duration of these activity significantly decreased after increasing AED.  These findings can also be seen in encephalopathy due to metabolic/toxic etiology, medication effects or diffuse cerebral injury.    Clinical correlation is recommended.           Gustavo Skinner MD  [facility].

## 2024-03-24 NOTE — PROCEDURES
LONG-TERM VIDEO EEG REPORT;     Reason for Examination: SDH/ Seizures     Technical Summary:   18 Channels of EEG and 1 Channel of EKG was performed utilizing internation 10/20 method. Motion, muscle and machine artifacts were noted. Recoding was interrupted for three hours for patient's surgery.        Recording Start date/time: 03/22 at 1743  Recording end date/time: 03/23 at 1700        Background Activity:   The background activity consisted of 9 Hz waveforms, reactive to eye opening/ external stimulation.     Abnormality:  Rare intermittent bilateral independent temporal sharp wave activity with phase reversal at T3 and T4 was noted.  Mild intermittent 1-3 hz diffuse slow wave activity lasting upto 30 seconds were noted.  These waveforms were quite rhythmic, raising suspicions for electrographic seizures.  These waveforms were not associated with any clinical manifestations.        Activation:     Hyperventilation:   Not Performed.     Photic Stimulation:  Driving response seen.No        Sleep:  Stage I and II sleep seen.     Impression:  This is a Abnormal prolonged Video EEG study.   Bilateral independent temporal sharp wave activity was noted. These waveforms are epileptogenic in nature. No electrographic seizures were noted.   Intermittent slow wave activity in delta range was also noted.  At times these waveforms were quite rhythmic since raising suspicions for electrographic seizures.  No clinical manifestations were noted.  These findings can also be seen in encephalopathy due to metabolic/toxic etiology, medication effects or diffuse cerebral injury.    Clinical correlation is recommended.           Gustavo Skinner MD  [facility].

## 2024-03-24 NOTE — OCCUPATIONAL THERAPY NOTE
Attempted to see patient for OT eval, however patient under continuous EEG.  Will hold today and continue to follow, resuming services as clinically appropriate.

## 2024-03-24 NOTE — PLAN OF CARE
Per RN, not appropriate for stroke education today. Check back later in the week.      RN Stroke Navigator team  [phone]

## 2024-03-24 NOTE — PROGRESS NOTES
[facility]   part of [facility]     Hospitalist Progress Note     Leanne Ching Patient Status:  Inpatient    1960 MRN [id number]   Location [facility] 6NE-A Attending Adelina Morris MD   Hosp Day # 4 PCP Shelby Frost MD     Chief Complaint: more awake     Subjective:     Patient is more awake, vEEG just completed. DHT in place and tolerating TF.     Objective:    Review of Systems:   A comprehensive review of systems was completed; pertinent positive and negatives stated in subjective.    Vital signs:  Temp:  [98 °F (36.7 °C)-99.1 °F (37.3 °C)] 98 °F (36.7 °C)  Pulse:  [] 93  Resp:  [13-29] 19  BP: (100-152)/(58-92) 132/72  SpO2:  [96 %-100 %] 100 %  AO: ()/(49-97) 108/57    Physical Exam:    General: No acute distress  Respiratory: No wheezes, no rhonchi  Cardiovascular: S1, S2, regular rate and rhythm  Abdomen: Soft, Non-tender, non-distended, positive bowel sounds  Neuro:right facial droop, dysarthria, following commands, PERRL, right side flaccid  Extremities: No edema      Diagnostic Data:    Labs:  Recent Labs   Lab 24  0437 24  0406   WBC 9.2 7.6 10.0   HGB 13.0 12.4 11.5*   MCV 90.0 89.2 88.9   .0 292.0 290.0   INR 0.89  --   --        Recent Labs   Lab 24  2354 24  0437 24  0406 24  0501 24  0417   * 116* 164*  --  144*   BUN 14 16 14  --  19   CREATSERUM 0.82 0.59 0.49*  --  0.61   CA 10.2 8.7 8.1*  --  8.4*   ALB 4.9*  --   --   --   --     140 141  --  141   K 4.0 3.5 3.3* 3.5 3.3*    113* 116*  --  113*   CO2 26.0 24.0 21.0  --  24.0   ALKPHO 66  --   --   --   --    AST 25  --   --   --   --    ALT 15  --   --   --   --    BILT 0.5  --   --   --   --    TP 7.9  --   --   --   --        Estimated Creatinine Clearance: 82.7 mL/min (based on SCr of 0.61 mg/dL).    No results for input(s): \"TROP\", \"TROPHS\", \"CK\" in the last 168 hours.    Recent Labs   Lab 24  2354   PTP 12.5   INR  0.89                  Microbiology    No results found for this visit on 03/20/24.      Imaging: Reviewed in Epic.    Medications:    ipratropium-albuterol  3 mL Nebulization Q6H WA    levETIRAcetam  1,500 mg Intravenous Q12H    insulin aspart  1-5 Units Subcutaneous 4 times per day       Assessment & Plan:      # Left frontal intraparenchymal hematoma sp craniotomy 3/21/24  -Keppra for seizure ppx  - Close monitoring in neuro ICU  -Cerebral angiogram without AV malformation or fistula  -MRI brain reviewed  - vEEG result pending   - neurosurgery on consult  -continue ICH protocol, BP goals per neuroCC      # dysphagia   - TF ongoing     # Pneumocephalus,   -Noted on imaging.   -Dw Dr Kolb-> O2 recommended      # Vasogenic edema  - mild     # Prediabetes  - hyperglycemia protocol   - ISS  -HgbA1c 6.0.     D/w spouse, children at bedside.      MD Adelina Sellers MD    Supplementary Documentation:     Quality:  DVT Mechanical Prophylaxis:   SCDs,    DVT Pharmacologic Prophylaxis   Medication   None                Code Status: Full Code  Miles: External urinary catheter in place  Miles Duration (in days):   Central line:    LISA:     Discharge is dependent on: course  At this point Ms. Ching is expected to be discharge to: rehab    The 21st Century Cures Act makes medical notes like these available to patients in the interest of transparency. Please be advised this is a medical document. Medical documents are intended to carry relevant information, facts as evident, and the clinical opinion of the practitioner. The medical note is intended as peer to peer communication and may appear blunt or direct. It is written in medical language and may contain abbreviations or verbiage that are unfamiliar.

## 2024-03-24 NOTE — SLP NOTE
ADULT SWALLOWING EVALUATION    ASSESSMENT      Pt re-evaluated in the neuro ICU.  Patient was much more alert today and did not need cues to maintain alertness compared to yesterday.  NG tube present.   Oral motor mechanism re-exam was completed; see objective section below. Unproductive protective cough/throat clear persists. Patient presented with an oropharyngeal dysphagia with absent signs/symptoms of aspiration during the controlled conditions of this exam. Swallow skills improving. Today, patient able to clear oral residuals with repeat swallows and SLP used swab less today to clear R side of mouth. Less anterior leakage with cues for \"tight\" lip seal.  Cannot r/o silent aspiration. Patient is ready for VFSS for further study when EEG completed/discontinued.  Please see below objective section for details. Family members present were trained in oral and pharyngeal strengthening exercises via verbal and written education. All questions answered to their apparent satisfaction.        RECOMMENDATIONS   Diet Recommendations - Solids: NPO  Diet Recommendations - Liquids: NPO                        Compensatory Strategies Recommended:  (.)  Aspiration Precautions:  (.)  Medication Administration Recommendations: Non-oral      Plan      -  Videoflouroscopic swallow study (VFSS) suggested to assess oropharyngeal swallow function, r/o aspiration, assess compensatory strategies to optimize safe swallowing and recommend safest, most least restrictive diet.   - Speech therapy services are recommended daily for dysphagia exercise training and speech/language evaluation and treatment to address aphasia, VFSS when clinically appropriate.  -  Communication evaluation  -  Continue patient/family education and training in dysphagia HEP  -  Oral care 2-3x/day and suction PRN    EDUCATION     - Patient/family verbalized understanding to results and recommendations of this RE-evaluation and was in agreement as well as dysphagia  wicho.  - Spoke with IONA Lyons.          HISTORY   MEDICAL HISTORY  Reason for Referral: Stroke protocol    Problem List  Principal Problem:    Nontraumatic cortical hemorrhage of left cerebral hemisphere (HCC)  Active Problems:    Hypertensive emergency    Intracranial bleed (HCC)    Hypertension    Vasogenic brain edema (HCC)    Hematoma    Encephalopathy      Past Medical History  Past Medical History:   Diagnosis Date    Diverticulosis large intestine w/o perforation or abscess w/o bleeding 2018    High cholesterol     Hyperlipidemia     Hypertension 3/21/2024    Internal hemorrhoids 2018       Past Surgical History:   Procedure Laterality Date    ABLATION      COLONOSCOPY N/A 2018    Procedure: COLONOSCOPY;  Surgeon: Jacinda Cruz MD;  Location: [facility] ENDOSCOPY          x 2       Prior Living Situation: Home with spouse  Diet Prior to Admission: Regular;Thin liquids  Precautions: Seizure;Aspiration    Patient/Family Goals: go to intensive rehab    SWALLOWING HISTORY  Current Diet Consistency: NPO  Dysphagia History: none    UPDATED IMAGING  MRI BRAIN(W+WO)/MRA BRAIN   CONCLUSION:  MRA shows no specific vascular lesion, occlusion or high-grade stenosis.  Complex imaging appearance of the left posterior frontal lobe consistent with subacute postoperative hematoma, including vasogenic edema, with mild midline shift, but no herniation or hydrocephalus.       LOCATION:  [facility]      Dictated by (CST): Brian Valerio MD on 3/23/2024 at 3:09 PM       Finalized by (CST): Brian Valerio MD on 3/23/2024 at 3:15 PM         MRV HEAD (W+WO)   CONCLUSION:  No venous thrombosis identified.      LOCATION:  [facility]      Dictated by (CST): Brian Valerio MD on 3/23/2024 at 3:15 PM       Finalized by (CST): Brian Valerio MD on 3/23/2024 at 3:17 PM      XR CHEST AP PORTABLE     Impression   CONCLUSION:  Dobbhoff tube has been placed, and the radiograph demonstrates the tube with the metal  tip within the distal stomach.       LOCATION:  [facility]     Dictated by (CST): Brian Valerio MD on 3/23/2024 at 4:34 PM      Finalized by (CST): Brian Valerio MD on 3/23/2024 at 4:34 PM        CT BRAIN OR HEAD   CONCLUSION:       1. Postoperative changes from left frontoparietal craniotomy with surgical clips along the left scalp are again noted.  There is partial redistribution of the previously visualized postprocedural pneumocephalus most pronounced along the anterior cerebral    convexities bilaterally.  Mixed attenuation extra-axial collection containing fluid, blood, and air along the left cerebral convexity measuring up to 13 mm in thickness, previously 16 mm.  Decreased left-to-right midline shift of approximately 3 mm,   previously 6 mm.      2. Redemonstration of changes related to partial evacuation of parenchymal hematoma in the left frontal parietal region the largest residual component again measures approximately 1.4 x 0.5 x 1.6 cm.  A 2nd stable small residual component measures   approximately 0.8 x 0.8 x 1.1 cm.  Small amount of edema and air attenuation noted along the operative bed.  Clinical correlation and continued follow-up is suggested.      LOCATION:  [facility]      Dictated by (CST): Husam Martin MD on 3/22/2024 at 6:22 AM       Finalized by (CST): Husam Martin MD on 3/22/2024 at 6:28 AM         XR CHEST AP PORTABLE   Impression   CONCLUSION:  Endotracheal tube tip below thoracic inlet with the tip positioned 2.6 cm from the jakub.  OG tube tip in the stomach     A preliminary report was provided by the [facility] teleradiology service.     LOCATION:  [facility]        Dictated by (CST): Husam Martin MD on 3/22/2024 at 5:47 AM      Finalized by (CST): Husam aMrtin MD on 3/22/2024 at 5:48 AM         SUBJECTIVE   Family members present (spouse, son and daughter). Spouse [place]. Siblings requesting written h/o of all exercises as they are from out of town and the spouse (who may have  reduced memory) will be with patient for the remainder of her hospitalization and will do HEP with patient.       OBJECTIVE   ORAL MOTOR EXAMINATION  Dentition: Natural;Functional  Symmetry: Reduced right facial;Lingual deviation to right  Strength: Reduced right facial;Reduced left facial;Reduced right lingual;Reduced left lingual     Range of Motion: Reduced right facial;Reduced right lingual;Reduced left lingual  Rate of Motion: Reduced    Voice Quality: Weak;Hoarse  Respiratory Status: Unlabored  Consistencies Trialed: Thin liquids;Nectar thick liquids;Puree;Hard solid  Method of Presentation: Staff/Clinician assistance;Spoon;Cup;Single sips  Patient Positioning: Upright;Midline (bed positioned to chair mode)    Oral Phase of Swallow: Impaired  Bolus Retrieval: Intact  Bilabial Seal: Impaired  Bolus Formation: Impaired  Bolus Propulsion: Impaired  Mastication: Impaired  Retention: Impaired    Pharyngeal Phase of Swallow: Impaired  Laryngeal Elevation Timing: Appears impaired  Laryngeal Elevation Strength: Appears impaired  Laryngeal Elevation Coordination: Appears intact  (Please note: Silent aspiration cannot be evaluated clinically. Videofluoroscopic Swallow Study is required to rule-out silent aspiration.)    Esophageal Phase of Swallow: No complaints consistent with possible esophageal involvement  Comments:               GOALS  Goal #1 Swallow re-evaluation  In progress         Goal #2 Patient will perform OME 80% acc with mod cues.    In progress         Goal #3 Patient will perform BOT and LE 80% acc with mod cues  In progress         Goal #4 VFSS when clinically appropriate    Recommended when EEG discontinued/completed         Goal #5 Speech/language evaluation when more alert    Not addressed            FOLLOW UP  Treatment Plan/Recommendations: Communication evaluation;Dysphagia therapy;SLP to reassess  Number of Visits to Meet Established Goals: 5  Follow Up Needed (Documentation Required): Yes  SLP  Follow-up Date: 03/25/24 (VFSS when EEG is discontinued/completed study)    Thank you for your referral.   If you have any questions, please contact TERE Connell

## 2024-03-24 NOTE — PLAN OF CARE
Assumed patient care at 0730. Patient awake, alert and oriented x3 (person, place, time) with no complaints of pain.Neurochecks performed Q4 hours - patient with minimal R sided movement, R facial droop and R tongue deviation. VSS; SBP goal of 100-140 mmHg. Medications administered per MAR. Tube feeds titrated to goal with free water flushed given Q4 hours per order. Continuous EEG in place. L head wound dressed with telfa pads; dressing remains clean/dry/intact.     Patient and family are aware of plan of care and endorses understanding. All questions answered.     1645: patient transferring to 76, report given to IONA Lujan

## 2024-03-24 NOTE — PROGRESS NOTES
[facility]   part of [facility]    Neurosurgery Progress Note  3/24/2024    Leanne Ching Patient Status:  Inpatient    1960 MRN [id number]   Location [facility] 6NE-A Attending Adelina Morris MD   Hosp Day # 4 PCP Shelby Frost MD     SUBJECTIVE:  Leanne Ching is a(n) 63 year old female s/p crani for evacuation of IPH.  Doing well.  She is comfortable in bed.  She was able to tolerate po intake.  No acute overnight events.       OBJECTIVE / PHYSICAL EXAM:  Vital Signs:  Blood pressure 137/69, pulse 90, temperature 98 °F (36.7 °C), temperature source Temporal, resp. rate (!) 29, height 65\", weight 137 lb 12.6 oz (62.5 kg), SpO2 100%.     Neuro:       MS: awake and alert      CN: pupils 3/2 B/L EOMI    Motor: R sided weakness baseline      Lab Results (last 24 hours):  Recent Results (from the past 24 hour(s))   POCT Glucose    Collection Time: 24  5:51 PM   Result Value Ref Range    POC Glucose 110 (H) 70 - 99 mg/dL   POCT Glucose    Collection Time: 24 12:14 AM   Result Value Ref Range    POC Glucose 121 (H) 70 - 99 mg/dL   Basic Metabolic Panel (8)    Collection Time: 24  4:17 AM   Result Value Ref Range    Glucose 144 (H) 70 - 99 mg/dL    Sodium 141 136 - 145 mmol/L    Potassium 3.3 (L) 3.5 - 5.1 mmol/L    Chloride 113 (H) 98 - 112 mmol/L    CO2 24.0 21.0 - 32.0 mmol/L    Anion Gap 4 0 - 18 mmol/L    BUN 19 9 - 23 mg/dL    Creatinine 0.61 0.55 - 1.02 mg/dL    Calcium, Total 8.4 (L) 8.5 - 10.1 mg/dL    Calculated Osmolality 297 (H) 275 - 295 mOsm/kg    eGFR-Cr 99 >=60 mL/min/1.73m2   Phosphorus    Collection Time: 24  4:17 AM   Result Value Ref Range    Phosphorus 2.4 (L) 2.5 - 4.9 mg/dL   Magnesium    Collection Time: 24  4:17 AM   Result Value Ref Range    Magnesium 2.2 1.6 - 2.6 mg/dL   POCT Glucose    Collection Time: 24  5:47 AM   Result Value Ref Range    POC Glucose 130 (H) 70 - 99 mg/dL   Emergency MRSA Screen by PCR    Collection Time: 24   8:54 AM   Result Value Ref Range    MRSA Screen By PCR Negative Negative   POCT Glucose    Collection Time: 03/24/24 11:45 AM   Result Value Ref Range    POC Glucose 137 (H) 70 - 99 mg/dL       Review of Imaging  none    Assessment/Plan:  S/p crani for evacution of IPH  Await EEG results  MRI obtained and no underlying pathology noted  Will follow    Armani Kolb DO    3/24/2024  12:52 PM

## 2024-03-24 NOTE — PROGRESS NOTES
[facility]  Neurocritical Care Progress Note     Leanne Ching Patient Status:  Inpatient    1960 MRN [id number]   Location [facility] 6NE-A Attending Adelina Morris MD   Hosp Day # 4 PCP Shelby Frost MD     Subjective:   Patient is a 63 year old female h/o hl p/w L frontal ich 3/19   Hospital course significant for onset of R sided weakness and slurred speech thus went to [facility] ED where w/u revealed SBP>190 and ct head with 3.6x3.6x4.8cm L frontal ich with mass effect and 3mm midline shift, and cta c/f underlying vasc malformation , angio neg 3/21, worsening mentation post angio w/R facial twitching but no eeg correlate, and decision made to proceed with crani for evac 3/21, extubated 3/22    No acute events overnight.    Vitals:   Temp:  [98 °F (36.7 °C)-99.1 °F (37.3 °C)] 98 °F (36.7 °C)  Pulse:  [] 114  Resp:  [13-29] 19  BP: (100-152)/(58-92) 100/84  SpO2:  [96 %-100 %] 100 %  AO: ()/(49-97) 130/89  Body mass index is 22.93 kg/m².    Intake/Output:    Intake/Output Summary (Last 24 hours) at 3/24/2024 1308  Last data filed at 3/24/2024 1200  Gross per 24 hour   Intake 3882.5 ml   Output 1100 ml   Net 2782.5 ml     Current Meds:      Physical Examination:   General- NAD  CV- RRR  Resp- dec breath sounds bilat  Neuro-  Mental status- opens eyes to voice, regards and follows most commands, speech dysarthric and much improved, naming 3/3  Cranial nerves- pupils equally round and reactive to light, +R facial droop  Motor/Sens- moves L spont, RUE 0/5, RLE 2/5    Diagnostics:   XR CHEST AP PORTABLE  (CPT=71045)    Result Date: 3/23/2024  CONCLUSION:  Dobbhoff tube has been placed, and the radiograph demonstrates the tube with the metal tip within the distal stomach.    LOCATION:  [facility]      Dictated by (CST): Brian Valerio MD on 3/23/2024 at 4:34 PM     Finalized by (CST): Brian Valerio MD on 3/23/2024 at 4:34 PM       MRV HEAD (W+WO)(ZWA=20641)    Result Date:  3/23/2024  CONCLUSION:  No venous thrombosis identified.   LOCATION:  [facility]   Dictated by (CST): Brian Valerio MD on 3/23/2024 at 3:15 PM     Finalized by (CST): Brian Valerio MD on 3/23/2024 at 3:17 PM       MRI BRAIN(W+WO)/MRA BRAIN (CPT=70553/36921)    Result Date: 3/23/2024  CONCLUSION:  MRA shows no specific vascular lesion, occlusion or high-grade stenosis.  Complex imaging appearance of the left posterior frontal lobe consistent with subacute postoperative hematoma, including vasogenic edema, with mild midline shift, but no herniation or hydrocephalus.   LOCATION:  [facility]   Dictated by (CST): Brian Valerio MD on 3/23/2024 at 3:09 PM     Finalized by (CST): Brian Valerio MD on 3/23/2024 at 3:15 PM      Lab Review     Recent Labs   Lab 03/21/24  0437 03/22/24  0406 03/23/24  0501 03/24/24  0417    141  --  141   K 3.5 3.3* 3.5 3.3*   * 116*  --  113*   CO2 24.0 21.0  --  24.0   * 164*  --  144*   BUN 16 14  --  19   CREATSERUM 0.59 0.49*  --  0.61     Recent Labs   Lab 03/19/24  2354 03/21/24  0437 03/22/24  0406   WBC 9.2 7.6 10.0   HGB 13.0 12.4 11.5*   .0 292.0 290.0     Assesment/Plan:     Neuro:  L frontal ich-   Differential includes hypertensive vs vasc malformation vs mass vs hemorrhagic conversion of ischemic stroke vs amyloid.   Angio 3/21 neg vasc malformation, mri/a/v brain w/wo neg for vasc malformation/mass.  S/p worsening mentation and crani for evac 3/21, postop per Neurosurg.  Cont neurochecks/pt/ot/st.  Cont keppra for seizure prophylaxis for intermittent temp sharp waves, until at least clinic followup.   Eeg with improvement in sharps on increased dose of keppra and no seizures, thus ok to dc eeg.   No further neurocritical care needs at this time, further management and outpt follow-up per Neurosurg, will follow peripherally please call with any questions.   Cardiac:  Hl- sbp goal 100-150  Pulmonary:  NC for pneumocephalus per  Neurosurg  Renal:  IVFs  GI:  TF  Heme/ID:  Afebrile, no leukocytosis  Endocrine/Rheum:  Monitor glucose, sliding scale insulin prn  DVT Prophylaxis:  SCD’s    Goals of the Day: neurochecks  A total of 40 minutes of critical care time (exclusive of billable procedures) was administered to manage and/or prevent neurologic instability. This involved direct patient intervention, complex decision making, and/or extensive discussions with the patient, family, and clinical staff.    Thank you for allowing me to participate in the care of this patient.     Nely Graham MD, [facility]  Medical Director of System Neurosciences  Chief, Section of Neurocritical Care  [facility] [place]

## 2024-03-24 NOTE — PHYSICAL THERAPY NOTE
Patient presented to [facility] ER on 3/20 with R UE weakness, facial droop, aphasia, slurred speech.  Patient was transferred to [facility] on 3/21, had cerebral angiogram on 3/21, underwent L craniotomy for hematoma evacuation on 3/21, extubated on 3/22.  Attempted to see patient for PT Evaluation; per RN, patient is on continuous EEG and not appropriate to be seen today.  Will hold today and continue to follow-up, will see patient when medically appropriate.

## 2024-03-25 ENCOUNTER — APPOINTMENT (OUTPATIENT)
Dept: GENERAL RADIOLOGY | Facility: HOSPITAL | Age: 64
End: 2024-03-25
Attending: STUDENT IN AN ORGANIZED HEALTH CARE EDUCATION/TRAINING PROGRAM
Payer: MEDICARE

## 2024-03-25 ENCOUNTER — APPOINTMENT (OUTPATIENT)
Dept: GENERAL RADIOLOGY | Facility: HOSPITAL | Age: 64
End: 2024-03-25
Attending: INTERNAL MEDICINE
Payer: MEDICARE

## 2024-03-25 LAB
ANION GAP SERPL CALC-SCNC: 5 MMOL/L (ref 0–18)
BUN BLD-MCNC: 15 MG/DL (ref 9–23)
CALCIUM BLD-MCNC: 8.8 MG/DL (ref 8.5–10.1)
CHLORIDE SERPL-SCNC: 111 MMOL/L (ref 98–112)
CO2 SERPL-SCNC: 26 MMOL/L (ref 21–32)
CREAT BLD-MCNC: 0.57 MG/DL
EGFRCR SERPLBLD CKD-EPI 2021: 101 ML/MIN/1.73M2 (ref 60–?)
GLUCOSE BLD-MCNC: 115 MG/DL (ref 70–99)
GLUCOSE BLD-MCNC: 121 MG/DL (ref 70–99)
GLUCOSE BLD-MCNC: 164 MG/DL (ref 70–99)
GLUCOSE BLD-MCNC: 173 MG/DL (ref 70–99)
GLUCOSE BLD-MCNC: 173 MG/DL (ref 70–99)
MAGNESIUM SERPL-MCNC: 2.3 MG/DL (ref 1.6–2.6)
OSMOLALITY SERPL CALC.SUM OF ELEC: 299 MOSM/KG (ref 275–295)
PHOSPHATE SERPL-MCNC: 2.2 MG/DL (ref 2.5–4.9)
POTASSIUM SERPL-SCNC: 3.6 MMOL/L (ref 3.5–5.1)
POTASSIUM SERPL-SCNC: 3.6 MMOL/L (ref 3.5–5.1)
SODIUM SERPL-SCNC: 142 MMOL/L (ref 136–145)

## 2024-03-25 PROCEDURE — 74230 X-RAY XM SWLNG FUNCJ C+: CPT | Performed by: INTERNAL MEDICINE

## 2024-03-25 PROCEDURE — 99232 SBSQ HOSP IP/OBS MODERATE 35: CPT | Performed by: INTERNAL MEDICINE

## 2024-03-25 PROCEDURE — 71045 X-RAY EXAM CHEST 1 VIEW: CPT | Performed by: STUDENT IN AN ORGANIZED HEALTH CARE EDUCATION/TRAINING PROGRAM

## 2024-03-25 RX ORDER — IPRATROPIUM BROMIDE AND ALBUTEROL SULFATE 2.5; .5 MG/3ML; MG/3ML
3 SOLUTION RESPIRATORY (INHALATION) EVERY 6 HOURS PRN
Status: DISCONTINUED | OUTPATIENT
Start: 2024-03-25 | End: 2024-03-29

## 2024-03-25 RX ORDER — ENOXAPARIN SODIUM 100 MG/ML
40 INJECTION SUBCUTANEOUS DAILY
Status: DISCONTINUED | OUTPATIENT
Start: 2024-03-26 | End: 2024-03-29

## 2024-03-25 NOTE — SLP NOTE
ADULT VIDEOFLUOROSCOPIC SWALLOWING STUDY    Admission Date: 3/20/2024  Evaluation Date: 03/25/24  Radiologist: Dr. Pierce    RECOMMENDATIONS   Diet Recommendations - Solids: Mechanical soft chopped/ Soft & Bite Sized  Diet Recommendations - Liquids: Thin Liquids (small, single sips only - no straws)    Further Follow-up:  Follow Up Needed (Documentation Required): Yes  SLP Follow-up Date: 03/26/24  Compensatory Strategies Recommended: No straws;Slow rate;Alternate consistencies;Small bites and sips;Extra sauce/gravy  Aspiration Precautions: Upright position;Slow rate;Small bites and sips;No straw  Medication Administration Recommendations: Crushed in puree;Whole in puree  Treatment Plan/Recommendations: Dysphagia therapy;Aspiration precautions;Communication evaluation    HISTORY   Background/Objective Information:    Problem List  Principal Problem:    Nontraumatic cortical hemorrhage of left cerebral hemisphere (HCC)  Active Problems:    Hypertensive emergency    Intracranial bleed (HCC)    Hypertension    Vasogenic brain edema (HCC)    Hematoma    Encephalopathy    Hypokalemia    Dysphagia      Past Medical History  Past Medical History:   Diagnosis Date    Diverticulosis large intestine w/o perforation or abscess w/o bleeding 5/18/2018    High cholesterol     Hyperlipidemia     Hypertension 3/21/2024    Internal hemorrhoids 5/18/2018       Current Diet Consistency: NPO  Prior Level of Function: Independent  Prior Living Situation: Home with spouse  History of Recent: Difficulty breathing (extubated 3/22/24)  Precautions: Seizure;Aspiration  Image Results: CXR 3/25/24  FINDINGS:  Cardiomediastinal silhouette within normal limits in size.  Persistent left lower lobe retrocardiac linear opacities are most consistent with atelectasis.  No focal consolidation, pleural effusion, or pneumothorax.  Radiopaque tip of Dobbhoff   projects over the left upper quadrant in the region of the gastric body. ging results:     Reason  for Referral: Stroke protocol      Family/Patient Goals:  Acute rehab     ASSESSMENT   DYSPHAGIA ASSESSMENT  Test completed in conjunction with Radiologist.  Patient Positioned: Upright;Midline.  Patient Viewed: Lateral.  Patient Alertness: Fully alert.  Consistencies Presented: Thin liquids;Nectar thick liquids/ Mildly thick;Puree;Soft solid to assess oropharyngeal swallow function and assess for compensatory strategies to improve safe swallow function.    THIN LIQUIDS  Method of Presentation: Teaspoon;Cup  Oral Phase of Swallow (VFSS - Thin Liquids): Impaired  Bolus Retrieval (VFSS - Thin Liquids): Intact  Bilabial Seal (VFSS - Thin Liquids): Intact  Bolus Formation (VFSS - Thin Liquids): Impaired  Bolus Propulsion (VFSS - Thin Liquids): Impaired  Triggered at: Pyriform sinuses  Premature Spillage to: Pyriform sinuses  Delay (seconds): 2-3 seconds  Residue Severity, Location: Mild;Valleculae;Pyriform sinuses  Cleared/Reduced with: Secondary swallow  Laryngeal Penetration: None  Tracheal Aspiration: None  NECTAR THICK LIQUIDS/ MILDLY THICK  Method of Presentation: Cup  Oral Phase of Swallow (VFSS - Nectar Thick Liquids): Impaired  Bolus Retrieval (VFSS - Nectar Thick Liquids): Intact  Bilabial Seal (VFSS - Nectar Thick Liquids): Intact  Bolus Formation (VFSS - Nectar Thick Liquids): Impaired  Bolus Propulsion (VFSS - Nectar Thick Liquids): Impaired  Triggered at: Aryepiglottic folds;Pyriform sinuses  Premature Spillage to: Valleculae  Delay (seconds): 1-2 seconds  Residue Severity, Location: Mild;Valleculae;Pyriform Sinuses  Cleared/Reduced with: Secondary swallow  Laryngeal Penetration: None  Tracheal Aspiration: None     PUREE  Oral Phase of Swallow (VFSS - Puree): Within Functional Limits  Triggered at: Valleculae  Delay (seconds): minimal  Residue Severity, Location: Mild;Valleculae;Pyriform sinuses  Cleared/Reduced with: Secondary swallow  Laryngeal Penetration: None  Tracheal Aspiration: None  SOFT  SOLID  Oral Phase of Swallow (VFSS - Soft Solid): Impaired  Bolus Retrieval (VFSS - Soft Solid): Intact  Bilabial Seal (VFSS - Soft Solid): Intact  Bolus Propulsion (VFSS - Soft Solid): Impaired  Mastication (VFSS - Soft Solid): Impaired  Triggered at: Valleculae  Delay (seconds): none  Residue Severity, Location: Mild;Valleculae;Pyriform sinuses  Cleared/Reduced with: Secondary swallow  Laryngeal Penetration: None  Tracheal Aspiration: None     Penetration Aspiration Scale Score: Score 1: Material does not enter airway       Overall Impression:   Pt positioned upright in mammography chair; alert & participatory; able to follow all commands required to exam; spouse accompanied patient. Radiologist present in room; head of chair positioned upright to 90 degrees and patient assisted and observed independently feeding herself po trials of thin via spoon & cup; nectar via up; pureed and soft solids. Results of exam revealed reduced bolus formation and propulsion; prolonged but complete mastication and minimal oral residue; delay in pharyngeal response in which thin and nectar thick bolus ran to valleculae and seeped into pyriform sinus prior to trigger of pharyngeal response. No laryngeal penetration or aspiration noted however patient is at risk. Timely pharyngeal response noted with pureed and soft solid consistencies. No laryngeal penetration or aspiration observed. Epiglottic inversion with recoil noted and laryngeal closure was adequate at height of swallow.     Following exam, discussed results, aspiration risks, diet recommendations, aspiration precautions and plan at length with pt and pt's spouse. Answered questions to this apparent satisfaction with good understanding reported. Informed RN of results and recommendations. Will continue to follow as per plan.               GOALS  Goal #1 The patient will tolerate soft & bite sized consistency and small, single sips/no straws thin liquids without overt signs or  symptoms of aspiration with 95 % accuracy over 3 session(s).  New goal   Goal #2 The patient/family/caregiver will demonstrate understanding and implementation of aspiration precautions and swallow strategies independently over 3 session(s).    New goal   Goal #3 The patient will utilize compensatory strategies as outlined by  BSSE (clinical evaluation) including Slow rate, Small bites, Small sips, Alternate liquids/solids, No straws, Upright 90 degrees 30 mins after meal, Feed patient, Supervision with meals with mod assistance 100 % of the time across 2 sessions.  New goal   Goal #4 Communication evaluation     New goal                             EDUCATION/INSTRUCTION  Reviewed results and recommendations with patient/family/caregiver.  Agreement/Understanding verbalized and all questions answered to their apparent satisfaction.    INTERDISCIPLINARY COMMUNICATION  Reviewed results with Radiologist; agreement verbalized.    Patient Experiencing Pain: No                FOLLOW UP  Treatment Plan/Recommendations: Dysphagia therapy;Aspiration precautions;Communication evaluation  Number of Visits to Meet Established Goals: 3        Thank you for your referral.   If you have any questions, please contact Gabriella GONZALES, SLP    Gabriella Galdamez MA, CCC-SLP  Pager [phone]

## 2024-03-25 NOTE — PROGRESS NOTES
[facility]  Neurosurgery Progress Note    Leanne Ching Patient Status:  Inpatient    1960 MRN [id number]   Location [facility] 7NE-A Attending Adelina Morris MD   Hosp Day # 5 PCP Shelby Frost MD     Chief Complaint:  Left frontal IPH    POD #3 s/p left frontal intraparenchymal hematoma evacuation    Subjective:  Patient with expressive aphasia, but able to say more words. Right sided weakness. No complaints of headaches. Plan for VSS today. Continuous EEG without correlation of epileptiform waves with clinical presentation.    Objective/Physical Exam:    Vital Signs:  Blood pressure 150/84, pulse 87, temperature 98.8 °F (37.1 °C), temperature source Oral, resp. rate 16, height 65\", weight 137 lb 12.6 oz (62.5 kg), SpO2 98%.  Respiratory:  nonlabored  CV:  well perfused  General: NAD, Non fluent speech, Able to say some words but still with difficulty in expression. Mood Appropriate  Neurologic:   A&Ox3  PERRL, EOMi, right droop, right tongue dev  LUE/LE 5/5, RUE 1/5, RLE 3/5  Decreased sensation on the right  Skin: Dressings removed and staples left ZOILA. No signs of infection    Labs:  Recent Labs   Lab 24  2354 24  0437 24  0406   RBC 4.59 4.24 3.95   HGB 13.0 12.4 11.5*   HCT 41.3 37.8 35.1   MCV 90.0 89.2 88.9   MCH 28.3 29.2 29.1   MCHC 31.5 32.8 32.8   RDW 14.0 14.5 14.4   NEPRELIM 4.16 4.29  --    WBC 9.2 7.6 10.0   .0 292.0 290.0       Recent Labs   Lab 24  0406 24  0501 24  0417 24  0556   *  --  144* 173*   BUN 14  --  19 15   CREATSERUM 0.49*  --  0.61 0.57   EGFRCR 105  --  99 101   CA 8.1*  --  8.4* 8.8     --  141 142   K 3.3* 3.5 3.3* 3.6  3.6   *  --  113* 111   CO2 21.0  --  24.0 26.0       Imaging:  XR VIDEO SWALLOW (CPT=74230)    Result Date: 3/25/2024  PROCEDURE:  XR VIDEO SWALLOW (CPT=74230)  TECHNIQUE:  Video fluoroscopic swallowing study was performed in cooperation with the speech  pathologist.  Barium of varying consistencies was administered orally with patient in the lateral projection.  COMPARISON:  None.  INDICATIONS:  assess swallow physiology  PATIENT STATED HISTORY: (As transcribed by Technologist)  Patient offered no additional history at this time.   CINE CAPTURES:  5 FLUORSCOPY TIME:  2 minutes 20 seconds RADIATION DOSE (AIR KERMA PRODUCT):  112.3 uGy/m2  FINDINGS:  The patient was challenged with thin, nectar, pureed and cracker consistencies by the speech pathologist.  One episode of penetration was observed with thin liquids.  No aspiration.   PLEASE SEE SPEECH PATHOLOGIST REPORT FOR FULL ASSESSMENT OF SWALLOWING MECHANISM.   LOCATION:  [facility]    Dictated by (CST): Hayden Pierce MD on 3/25/2024 at 9:55 AM     Finalized by (CST): Hayden Pierce MD on 3/25/2024 at 9:55 AM       XR CHEST AP PORTABLE  (CPT=71045)    Result Date: 3/25/2024  CONCLUSION:  Tip of Dobbhoff projects in the region of the gastric body, retracted from previous exam performed 3/23/2024.  Clinical service notified of these findings with preliminary radiology report from [facility] services.    LOCATION:  [id number]      Dictated by (CST): Kanchan Harmon MD on 3/25/2024 at 7:51 AM     Finalized by (CST): Kanchan Harmon MD on 3/25/2024 at 7:53 AM       XR CHEST AP PORTABLE  (CPT=71045)    Result Date: 3/23/2024  CONCLUSION:  Dobbhoff tube has been placed, and the radiograph demonstrates the tube with the metal tip within the distal stomach.    LOCATION:  [facility]      Dictated by (CST): Brian Valerio MD on 3/23/2024 at 4:34 PM     Finalized by (CST): Brian Valerio MD on 3/23/2024 at 4:34 PM       MRV HEAD (W+WO)(JEY=15798)    Result Date: 3/23/2024  CONCLUSION:  No venous thrombosis identified.   LOCATION:  [facility]   Dictated by (CST): Brian Valerio MD on 3/23/2024 at 3:15 PM     Finalized by (CST): Brian Valerio MD on 3/23/2024 at 3:17 PM       MRI BRAIN(W+WO)/MRA BRAIN (CPT=70553/96723)    Result Date: 3/23/2024  CONCLUSION:   MRA shows no specific vascular lesion, occlusion or high-grade stenosis.  Complex imaging appearance of the left posterior frontal lobe consistent with subacute postoperative hematoma, including vasogenic edema, with mild midline shift, but no herniation or hydrocephalus.   LOCATION:  [facility]   Dictated by (CST): Brian Valerio MD on 3/23/2024 at 3:09 PM     Finalized by (CST): Brian Valerio MD on 3/23/2024 at 3:15 PM       CT BRAIN OR HEAD ([id number])    Result Date: 3/22/2024  CONCLUSION:   1. Postoperative changes from left frontoparietal craniotomy with surgical clips along the left scalp are again noted.  There is partial redistribution of the previously visualized postprocedural pneumocephalus most pronounced along the anterior cerebral  convexities bilaterally.  Mixed attenuation extra-axial collection containing fluid, blood, and air along the left cerebral convexity measuring up to 13 mm in thickness, previously 16 mm.  Decreased left-to-right midline shift of approximately 3 mm, previously 6 mm.  2. Redemonstration of changes related to partial evacuation of parenchymal hematoma in the left frontal parietal region the largest residual component again measures approximately 1.4 x 0.5 x 1.6 cm.  A 2nd stable small residual component measures approximately 0.8 x 0.8 x 1.1 cm.  Small amount of edema and air attenuation noted along the operative bed.  Clinical correlation and continued follow-up is suggested.  LOCATION:  [facility]   Dictated by (CST): Husam Martin MD on 3/22/2024 at 6:22 AM     Finalized by (CST): Husam Martin MD on 3/22/2024 at 6:28 AM       XR CHEST AP PORTABLE  (CPT=71045)    Result Date: 3/22/2024  CONCLUSION:  Endotracheal tube tip below thoracic inlet with the tip positioned 2.6 cm from the jakub.  OG tube tip in the stomach  A preliminary report was provided by the [facility] teleradiology service.  LOCATION:  [facility]      Dictated by (CST): Husam Martin MD on 3/22/2024 at 5:47 AM      Finalized by (CST): Husam Martin MD on 3/22/2024 at 5:48 AM       CT BRAIN OR HEAD ([id number])    Result Date: 3/22/2024  CONCLUSION:   1. Interval changes of left frontoparietal craniotomy with surgical clips along the left scalp.  Moderate amount of postprocedural pneumocephalus most pronounced along left cerebral convexity.  Mixed attenuation extra-axial collection containing fluid and air along the left cerebral convexity measuring up to 16 mm in thickness.  There is left-to-right midline shift of approximately 6 mm.  2. Interval partial evacuation of parenchymal hematoma in the left frontal parietal region the largest residual component measures approximately 1.4 x 0.5 x 1.6 cm, previously 4.0 x 3.2 x 5.0 cm.  A 2nd small residual component measures approximately 0.8  x 0.8 x 1.1 cm.  Small amount of edema and air attenuation noted along the operative bed.  Clinical correlation and continued follow-up is suggested.  A preliminary report was provided by the [facility] teleradiology service.  LOCATION:  [id number]   Dictated by (CST): Husam Martin MD on 3/22/2024 at 4:59 AM     Finalized by (CST): Husam Martin MD on 3/22/2024 at 5:03 AM       US INTRAOPERATIVE GUIDANCE (CPT=76998)    Result Date: 3/22/2024  CONCLUSION:  Please see the procedure/operative report for further details.    LOCATION:  [facility]    Dictated by (CST): Stromberg, LeRoy, MD on 3/22/2024 at 0:06 AM     Finalized by (CST): Stromberg, LeRoy, MD on 3/22/2024 at 0:07 AM       CT BRAIN OR HEAD ([id number])    Result Date: 3/21/2024  CONCLUSION:  Slight interval increase in size of left frontal region parenchymal hematoma with measurements given above.  There is mass effect related to this but no midline shift.  Above findings were reported to IONA Abdi on March 21, 2024 at 3:32 p.m..    LOCATION:  [facility]   Dictated by (CST): Jose Alberto Min MD on 3/21/2024 at 3:29 PM     Finalized by (CST): Jose Alberto Min MD on 3/21/2024 at 3:32 PM       CT BRAIN OR HEAD  ([id number])    Result Date: 3/21/2024  CONCLUSION:  4.5 x 3.7 x 3.4 cm acute intraparenchymal hematoma arising from the posterior left frontal lobe with localized mass effect and a small amount of surrounding vasogenic edema and mild midline shift to the right of 3 mm.  No significant change.      LOCATION:  [facility]   Dictated by (CST): Charis Barnes MD on 3/21/2024 at 6:25 AM     Finalized by (CST): Charis Barnes MD on 3/21/2024 at 6:36 AM       CT STROKE CTA BRAIN/CTA NECK (W IV)(CPT=70496/87796)    Result Date: 3/20/2024  CONCLUSION:   Large left frontal intraparenchymal hematoma again noted.  There is associated internal serpiginous enhancement, which may represent an abnormal tangle of vessels.  Finding raises the possibility of an underlying vascular malformation.  Neuro surgical consultation has already been placed at this time.  Further assessment with diagnostic interventional angiography and/or contrast enhanced MRI brain would be helpful.  Local mass effect from left frontal intraparenchymal hematoma as well as left to right 5 millimeter midline shift again noted.  No flow-limiting stenosis within the major arteries of the neck.  Approximately 36% narrowing of the left carotid bulb by atherosclerotic plaque.  No significant narrowing of the right carotid bulb.  No high-grade flow-limiting stenosis within the major arteries of the [id number] intracranially.     Dictated by (CST): Giselle Alexander MD on 3/20/2024 at 8:05 AM     Finalized by (CST): Giselle Alexander MD on 3/20/2024 at 8:22 AM          CT BRAIN OR HEAD ([id number])    Result Date: 3/20/2024  CONCLUSION:  1. Large acute 4.5 x 3.5 cm intraparenchymal hematoma centered at the periphery of the left frontal lobe.  Although the hematoma largely appears similar in size and morphology since examination from approximately 6.5 hours prior, there is a small curvilinear band of acute hemorrhagic components along the posterior margin of the hematoma, which  are new since prior.  Mild surrounding vasogenic edema appears unchanged.  Stable left cerebral hemispheric mass effect with mild 2-3 mm left-to-right midline shift.  Continued close follow-up to resolution is advised. 2. Intracranial atherosclerosis.   Dictated by (CST): Finesse Martinez MD on 3/20/2024 at 7:54 AM     Finalized by (CST): Finesse Martinez MD on 3/20/2024 at 7:59 AM          XR CHEST AP PORTABLE  (CPT=71045)    Result Date: 3/20/2024  CONCLUSION:   No radiographic evidence of acute cardiopulmonary process.  A preliminary report was issued by the [facility] teleradiology service. There are no clinically actionable discrepancies.    Dictated by (CST): Giselle Alexander MD on 3/20/2024 at 7:46 AM     Finalized by (CST): Giselle Alexander MD on 3/20/2024 at 7:46 AM          CT STROKE BRAIN (NO IV)(CPT=70450)    Result Date: 3/20/2024  CONCLUSION:   Large 4.8 centimeter left frontal intraparenchymal hematoma with moderate local mass effect, as well as 3 millimeters left-to-right midline shift.  Neurosurgical consultation is recommended.  Follow-up CT brain is recommended.  Differential considerations include underlying vascular etiologies such as AVM, a hemorrhagic mass, or other etiologies.   This report was called immediately at 105 Eastern time on 03/20/2024 hours to Dr. Angeles by Dr Bryant Reynoso   A preliminary report was issued by the [facility] teleradiology service. There are no clinically actionable discrepancies.  Dictated by (CST): Giselle Alexander MD on 3/20/2024 at 7:16 AM     Finalized by (CST): Giselle Alexander MD on 3/20/2024 at 7:23 AM              Assessment:  62 yo female with left frontal IPH s/p hematoma evacuation    Plan:  Keppra per Neuro  PT/OT/SLP - would benefit from acute rehab, family [facility]  EEG - no seizures captured  MRI brain - no underlying pathology  DVT prophylaxis SCDs, OK for chemoprophylaxis  Staples to be removed after 4/5, ok to  remove in rehab  Follow up in 1 month in clinic with a head CT    Is this a shared or split note between Advanced Practice Provider and Physician? Yes       ALISON Lozada  [facility]  3/25/2024, 10:06 AM  [facility] [id number]

## 2024-03-25 NOTE — DISCHARGE PLANNING
Patient follow-up appointment information:     Visit Type: Stroke follow up  Date of TIA/Stroke: 3/20/2024  Type of Stroke: Hemorrhagic  Patient to follow-up: 3 months  OP Neurologist: n/a  New patient to neurology service: yes  Anticipated discharge (if known): 2-3 days  Discharge disposition (if known): Acute rehab      RN Stroke Navigator team  [phone]

## 2024-03-25 NOTE — OCCUPATIONAL THERAPY NOTE
OCCUPATIONAL THERAPY EVALUATION - INPATIENT     Room Number: 7615/7615-A  Evaluation Date: 3/25/2024  Type of Evaluation: Initial  Presenting Problem: nontraumatic cortical hemorrhage of left cerebral hemisphere    Physician Order: IP Consult to Occupational Therapy  Reason for Therapy: ADL/IADL Dysfunction and Discharge Planning    OCCUPATIONAL THERAPY ASSESSMENT   Patient is currently functioning below baseline with toileting, upper body dressing, lower body dressing, grooming, eating, bed mobility, transfers, stating sitting balance, dynamic sitting balance, static standing balance, dynamic standing balance, maintaining seated position, and functional standing tolerance. Prior to admission, patient's baseline is Mod I.  Patient is requiring maximum assist as a result of the following impairments: decreased functional strength, decreased functional reach, decreased endurance, impaired stand and sitting balance, impaired coordination, impaired motor planning, decreased muscular endurance, and limited RUE ROM. Occupational Therapy will continue to follow for duration of hospitalization.    Patient will benefit from continued skilled OT Services to facilitate return to prior level of function as patient demonstrates high motivation with excellent tolerance to an intensive therapy program       History Related to Current Admission: Patient is a 63 year old female admitted on 3/20/2024 with Presenting Problem: nontraumatic cortical hemorrhage of left cerebral hemisphere. Co-Morbidities : Hypokalemia, HTN, Internal hemorrhoids    WEIGHT BEARING RESTRICTION  Weight Bearing Restriction: None                Recommendations for nursing staff:   Transfers: Max A  Toileting location: commode or bed level    EVALUATION SESSION:  Patient Start of Session: supine in bed for session  FUNCTIONAL TRANSFER ASSESSMENT  Sit to Stand: Edge of Bed  Edge of Bed: Maximum Assist (Max A x2 to come to stand then able to stand with max A x1,  pt with significant right lean, RUE flaccid and support provided throughout stand, pt compelted MAx A x2 pivot to chair with one person Max A and 2nd person min A for lines and RUE  management)    BED MOBILITY  Rolling: Maximum Assist  Supine to Sit : Maximum Assist (to get to EOB adn sit at EOB)    BALANCE ASSESSMENT  Static Sitting: Moderate Assist (with support on LUE holding on to bed post)  Sitting Bilateral: Maximum Assist (to maintain sitting with more difficulty to with lean to left)  Static Standing: Maximum Assist (to maintain standing with significant lean to right)  Standing Bilateral: Maximum Assist (unable to advance feet for side steps, only pivot to chair with Max A)    FUNCTIONAL ADL ASSESSMENT  Eating: Moderate Assist (only able to use LUE)  LB Dressing Seated: Maximum Assist (for socks)      ACTIVITY TOLERANCE: vitals stable                         O2 SATURATIONS       COGNITION  Overall Cognitive Status:  WFL - within functional limits    Upper Extremity   ROM: within functional limits on LUE, RUE flaccid, no sublax  Strength: within functional limits on LUE, RUE flaccid, no sublax  Coordination  Gross motor: WFL on LUE, RUE flaccid, no sublax  Fine motor: WFL on LUE, RUE flaccid, no sublax  Sensation: Light touch:  intact    EDUCATION PROVIDED  Patient: Role of Occupational Therapy; Plan of Care  Patient's Response to Education: Verbalized Understanding; Returned Demonstration    Equipment used: TBD  Demonstrates functional use, Would benefit from additional trial      Therapist Comments: All activities completed at EOB of leaning for core stability and reaching and getting balance in sitting in different positions completed in prep for sitting ADLs and reaching to complete dressing and grooming in future session as balance and functional reach improve.  Pt completed standing balance activities in prep for standing ADLs such as LB dressing where balance will be needed to complete  safely.    Patient End of Session: Up in chair;Needs met;Call light within reach;All patient questions and concerns addressed;SCDs in place;Alarm set;Family present    OCCUPATIONAL PROFILE    HOME SITUATION  Type of Home: House  Home Layout: Two level  Lives With: Spouse    Toilet and Equipment: Standard height toilet  Shower/Tub and Equipment: Walk-in shower                     Prior Level of Function: Pt typically independent with ADLs and mobility. Pt does not use AD.    SUBJECTIVE   Pt stated, \"I want to get better.\"    PAIN ASSESSMENT  Ratin  Location: no pain at this time       OBJECTIVE  Precautions: None  Fall Risk: High fall risk      ASSESSMENTS    AM-PAC ‘6-Clicks’ Inpatient Daily Activity Short Form  -   Putting on and taking off regular lower body clothing?: A Lot  -   Bathing (including washing, rinsing, drying)?: A Lot  -   Toileting, which includes using toilet, bedpan or urinal? : A Lot  -   Putting on and taking off regular upper body clothing?: A Lot  -   Taking care of personal grooming such as brushing teeth?: A Lot  -   Eating meals?: A Lot    AM-PAC Score:  Score: 12  Approx Degree of Impairment: 66.57%  Standardized Score (AM-PAC Scale): 30.6    ADDITIONAL TESTS     NEUROLOGICAL FINDINGS      COGNITION ASSESSMENTS       PLAN  OT Treatment Plan: Balance activities;Energy conservation/work simplification techniques;ADL training;IADL training;Continued evaluation;Compensatory technique education;Equipment eval/education;Patient/Family training;Patient/Family education;Endurance training;UE strengthening/ROM;Functional transfer training  Rehab Potential : Good  Frequency: 3-5x/week  Number of Visits to Meet Established Goals: 5    ADL Goals   Patient will perform grooming: with mod assist  Patient will perform upper body dressing:  with min assist    Functional Transfer Goals  Patient will transfer from supine to sit:  with min assist  Patient will transfer from sit to stand:  with mod  assist    Patient Evaluation Complexity Level:   Occupational Profile/Medical History MODERATE - Expanded review of history including review of medical or therapy record   Specific performance deficits impacting engagement in ADL/IADL MODERATE  3 - 5 performance deficits   Client Assessment/Performance Deficits MODERATE - Comorbidities and min to mod modifications of tasks    Clinical Decision Making MODERATE - Analysis of occupational profile, detailed assessments, several treatment options    Overall Complexity MODERATE     OT Session Time: 30 minutes  Self-Care Home Management: 0 minutes  Therapeutic Activity: 0 minutes  Neuromuscular Re-education: 15 minutes  Therapeutic Exercise: 0 minutes  Cognitive Skills: 0 minutes  Sensory Integrative: 0 minutes  Orthotic Management and Trainin minutes  Can add/delete any of these

## 2024-03-25 NOTE — RESPIRATORY THERAPY NOTE
Patient receiving Q6 Duoneb treatments; changed to PRN.  Patient is not in respiratory distress, no increased WOB.  Discussed with patient and  at bedside, all in agreement.

## 2024-03-25 NOTE — PLAN OF CARE
Assumed care @ 0730  Pt A/O x 4, follows commands   On RA  Q4 neuros, see flowsheet   Seizure precautions in place  Video swallow completed. Pt approved to start on    soft bite sized diet, thin liquids, no straws.  Tolerating diet for lunch  TF and dobhoff discontinued per Dr. Morris   Pt worked with PT/OT  Pt and family updated on POC. All questions answered   Call light within reach and fall precautions placed

## 2024-03-25 NOTE — PHYSICAL THERAPY NOTE
PHYSICAL THERAPY EVALUATION - INPATIENT     Room Number: 7615/7615-A  Evaluation Date: 3/25/2024  Type of Evaluation: Initial  Physician Order: PT Eval and Treat    Presenting Problem: Nontraumatic cortical hemorrhage of L cerebral hemisphere  Co-Morbidities : Hypokalemia, HTN, Internal hemorrhoids  Reason for Therapy: Mobility Dysfunction and Discharge Planning    PHYSICAL THERAPY ASSESSMENT   Patient is currently functioning below baseline with bed mobility, transfers, and gait.  Prior to admission, patient's baseline is IND with all ADLs and Mobility.  Patient is requiring moderate assist and maximum assist as a result of the following impairments: decreased functional strength, decreased endurance/aerobic capacity, impaired Sitting and standing balance, decreased muscular endurance, and medical status.  Physical Therapy will continue to follow for duration of hospitalization.    Patient will benefit from continued skilled PT Services to facilitate return to prior level of function as patient demonstrates high motivation with excellent tolerance to an intensive therapy program .    PLAN  PT Treatment Plan: Bed mobility;Body mechanics;Endurance;Gait training;Strengthening;Stair training;Transfer training;Balance training  Rehab Potential : Good  Frequency (Obs): 3-5x/week  Number of Visits to Meet Established Goals: 3      CURRENT GOALS    Goal #1 Patient is able to demonstrate supine - sit EOB @ level: minimum assistance     Goal #2 Patient is able to demonstrate transfers EOB to/from BSC at assistance level: minimum assistance     Goal #3 Patient is able to ambulate 50 feet with assist device: walker - rolling at assistance level: minimum assistance     Goal #4    Goal #5    Goal #6    Goal Comments: Goals established on 3/25/2024      PHYSICAL THERAPY MEDICAL/SOCIAL HISTORY  History related to current admission: Patient is a 63 year old female admitted on 3/20/2024 from Home for Pt was at [facility] 3/20 for R UE weakness, facial droop, aphasia and slurred speech, Pt was transferred to [facility] 3/21 for an angiogram and s/p 3/21 L Craniotomy for hematoma..  Pt diagnosed with Nontraumatic cortical hemorrhage of L cerebral hemisphere.      HOME SITUATION  Type of Home: House   Home Layout: Two level  Stairs to Enter : 5     Stairs to Bedroom: 8  Railing: Yes    Lives With: Spouse     Patient Owned Equipment: None       Prior Level of [place]: Per EMR, pt lives at home with her spouse. Pt is IND with all ADLs and does not use an assisted device to ambulate.    SUBJECTIVE  Pt answers with yes and no nods      OBJECTIVE  Precautions: None  Fall Risk: High fall risk    WEIGHT BEARING RESTRICTION  Weight Bearing Restriction: None                PAIN ASSESSMENT  Ratin  Location: Pt reported no pain       COGNITION  Overall Cognitive Status:  WFL - within functional limits  Pt is able to follow simple commands  Pt is able to understand all questions     RANGE OF MOTION AND STRENGTH ASSESSMENT  Upper extremity ROM and strength are within functional limits Please see OT notes    Lower extremity ROM is within functional limits     Lower extremity strength is within functional limits except for the following:    Right Hip flexion  2+/5  Left Hip flexion  3+/5  Right Knee extension  3-/5  Left Knee extension  3+/5  Right Knee flexion  3-/5  Left Knee flexion  3+/5  Right Dorsiflexion  2+/5  Left Dorsiflexion  3+/5      BALANCE  Static Sitting: Fair -  Dynamic Sitting: Poor +  Static Standing: Poor  Dynamic Standing: Poor -    ADDITIONAL TESTS  Additional Tests: Modified [place]              Modified [place]: 4                  ACTIVITY TOLERANCE                         O2 WALK       NEUROLOGICAL FINDINGS                        AM-PAC '6-Clicks' INPATIENT SHORT FORM - BASIC MOBILITY  How much difficulty does the patient currently have...  Patient Difficulty: Turning over in bed (including adjusting  bedclothes, sheets and blankets)?: A Lot   Patient Difficulty: Sitting down on and standing up from a chair with arms (e.g., wheelchair, bedside commode, etc.): A Lot   Patient Difficulty: Moving from lying on back to sitting on the side of the bed?: A Lot   How much help from another person does the patient currently need...   Help from Another: Moving to and from a bed to a chair (including a wheelchair)?: A Lot   Help from Another: Need to walk in hospital room?: A Lot   Help from Another: Climbing 3-5 steps with a railing?: Total       AM-PAC Score:  Raw Score: 11   Approx Degree of Impairment: 72.57%   Standardized Score (AM-PAC Scale): 33.86   CMS Modifier (G-Code): CL    FUNCTIONAL ABILITY STATUS  Gait Assessment   Functional Mobility/Gait Assessment  Gait Assistance: Not tested    Skilled Therapy Provided     Bed Mobility:  Rolling: Mod A  Supine to sit: Mod A   Sit to supine: NT     Transfer Mobility:  Sit to stand: Mod A Pt was given verbal and tactile cues for improve hand placement and feet placement  Stand to sit: Mod A  Gait = NT    Therapist's Comments: Pt participated in stand pivot transfer with PT. Pt was observed to be able to manage steps however has decrease R stance time with increase R knee instability. Pt required Max A to complete step stand pivot transfer from bed to chair. While standing pt was cued to increase weight shift towards the L to improve COG over BLOSSOM. While sitting pt demonstrated Min A to maintain sitting with verbal and tactile cues to self correct herself when losing balance. Pt was given cues to reach for foot board to assist in maintaining sitting with decrease amount of assistance. Pt is a good candidate for acute rehab as pt is able to tolerate 3hrs of therapy, Has good family support and goals for discharge to community.     Exercise/Education Provided:  Bed mobility  Body mechanics  Gait training  Transfer training    Patient End of Session: Up in chair;Needs met;Call  light within reach;RN aware of session/findings;All patient questions and concerns addressed;Family present      Patient Evaluation Complexity Level:  History Moderate - 1 or 2 personal factors and/or co-morbidities   Examination of body systems Moderate - addressing a total of 3 or more elements   Clinical Presentation Moderate - Evolving   Clinical Decision Making Moderate - Evolving       PT Session Time: 25 minutes  Therapeutic Activity: 15 minutes

## 2024-03-25 NOTE — PLAN OF CARE
Communication board used overnight as well as yes and no questions. Pt is A&Ox4, on RA. Dobhoff in place in left nares. Dobhoff was dislodged and re advancement was needed. Chest xray ordered. Dobhoff was confirmed to be in the stomach. Feedings were resumed. HOB greater than 30 degrees. Feeding at goal. Pt A&Ox4, speech is improving. Right still flacid, suction placed at bedside for oral secretions. Mouth swabbed and lip moisturizer placed on.  Purewick changed and in place. Modified call light placed in room. Keppra given this morning. No complaints of pain.   All safety measures maintained, personal items within reach.     POC Video swallow, PT/OT evaluation       Problem: NEUROLOGICAL - ADULT  Goal: Achieves stable or improved neurological status  Description: INTERVENTIONS  - Assess for and report changes in neurological status  - Initiate measures to prevent increased intracranial pressure  - Maintain blood pressure and fluid volume within ordered parameters to optimize cerebral perfusion and minimize risk of hemorrhage  - Monitor temperature, glucose, and sodium. Initiate appropriate interventions as ordered  Outcome: Progressing  Goal: Achieves maximal functionality and self care  Description: INTERVENTIONS  - Monitor swallowing and airway patency with patient fatigue and changes in neurological status  - Encourage and assist patient to increase activity and self care with guidance from PT/OT  - Encourage visually impaired, hearing impaired and aphasic patients to use assistive/communication devices  Outcome: Progressing  Goal: Absence of seizures  Description: INTERVENTIONS  - Monitor for seizure activity  - Administer anti-seizure medications as ordered  - Monitor neurological status  Outcome: Progressing  Goal: Remains free of injury related to seizure activity  Description: INTERVENTIONS:  - Maintain airway, patient safety  and administer oxygen as ordered  - Monitor patient for seizure activity,  document and report duration and description of seizure to MD/LIP  - If seizure occurs, turn patient to side and suction secretions as needed  - Reorient patient post seizure  - Seizure pads on all 4 side rails  - Instruct patient/family to notify RN of any seizure activity  - Instruct patient/family to call for assistance with activity based on assessment  Outcome: Progressing     Problem: PAIN - ADULT  Goal: Verbalizes/displays adequate comfort level or patient's stated pain goal  Description: INTERVENTIONS:  - Encourage pt to monitor pain and request assistance  - Assess pain using appropriate pain scale  - Administer analgesics based on type and severity of pain and evaluate response  - Implement non-pharmacological measures as appropriate and evaluate response  - Consider cultural and social influences on pain and pain management  - Manage/alleviate anxiety  - Utilize distraction and/or relaxation techniques  - Monitor for opioid side effects  - Notify MD/LIP if interventions unsuccessful or patient reports new pain  - Anticipate increased pain with activity and pre-medicate as appropriate  Outcome: Progressing     Problem: RISK FOR INFECTION - ADULT  Goal: Absence of fever/infection during anticipated neutropenic period  Description: INTERVENTIONS  - Monitor WBC  - Administer growth factors as ordered  - Implement neutropenic guidelines  Outcome: Progressing     Problem: SAFETY ADULT - FALL  Goal: Free from fall injury  Description: INTERVENTIONS:  - Assess pt frequently for physical needs  - Identify cognitive and physical deficits and behaviors that affect risk of falls.  - [place] fall precautions as indicated by assessment.  - Educate pt/family on patient safety including physical limitations  - Instruct pt to call for assistance with activity based on assessment  - Modify environment to reduce risk of injury  - Provide assistive devices as appropriate  - Consider OT/PT consult to assist with  strengthening/mobility  - Encourage toileting schedule  Outcome: Progressing     Problem: DISCHARGE PLANNING  Goal: Discharge to home or other facility with appropriate resources  Description: INTERVENTIONS:  - Identify barriers to discharge w/pt and caregiver  - Include patient/family/discharge partner in discharge planning  - Arrange for needed discharge resources and transportation as appropriate  - Identify discharge learning needs (meds, wound care, etc)  - Arrange for interpreters to assist at discharge as needed  - Consider post-discharge preferences of patient/family/discharge partner  - Complete POLST form as appropriate  - Assess patient's ability to be responsible for managing their own health  - Refer to Case Management Department for coordinating discharge planning if the patient needs post-hospital services based on physician/LIP order or complex needs related to functional status, cognitive ability or social support system  Outcome: Progressing

## 2024-03-25 NOTE — PROGRESS NOTES
[facility]   part of [facility]     Hospitalist Progress Note     Leanne Ching Patient Status:  Inpatient    1960 MRN [id number]   Location [facility] 6NE-A Attending Adelina Morris MD   Hosp Day # 5 PCP Shelby Frost MD     Chief Complaint: moving right side a bit more     Subjective:     Patient is more awake,returned from VSS.     Objective:    Review of Systems:   A comprehensive review of systems was completed; pertinent positive and negatives stated in subjective.    Vital signs:  Temp:  [98.2 °F (36.8 °C)-99.4 °F (37.4 °C)] 98.8 °F (37.1 °C)  Pulse:  [] 87  Resp:  [16-29] 16  BP: (100-150)/(61-84) 150/84  SpO2:  [97 %-100 %] 98 %  AO: (108-136)/(55-89) 108/57    Physical Exam:    General: No acute distress  Respiratory: No wheezes, no rhonchi  Cardiovascular: S1, S2, regular rate and rhythm  Abdomen: Soft, Non-tender, non-distended, positive bowel sounds  Neuro:right facial droop, dysarthria, following commands, PERRL, right side slightly better and trying to lift foot and and shrugging right shoulder. Improved exam   Extremities: No edema      Diagnostic Data:    Labs:  Recent Labs   Lab 247 24  0406   WBC 9.2 7.6 10.0   HGB 13.0 12.4 11.5*   MCV 90.0 89.2 88.9   .0 292.0 290.0   INR 0.89  --   --        Recent Labs   Lab 247 24  0406 24  0501 24  0417 24  0556   *   < > 164*  --  144* 173*   BUN 14   < > 14  --  19 15   CREATSERUM 0.82   < > 0.49*  --  0.61 0.57   CA 10.2   < > 8.1*  --  8.4* 8.8   ALB 4.9*  --   --   --   --   --       < > 141  --  141 142   K 4.0   < > 3.3* 3.5 3.3* 3.6  3.6      < > 116*  --  113* 111   CO2 26.0   < > 21.0  --  24.0 26.0   ALKPHO 66  --   --   --   --   --    AST 25  --   --   --   --   --    ALT 15  --   --   --   --   --    BILT 0.5  --   --   --   --   --    TP 7.9  --   --   --   --   --     < > = values in this interval not  displayed.       Estimated Creatinine Clearance: 88.5 mL/min (based on SCr of 0.57 mg/dL).    No results for input(s): \"TROP\", \"TROPHS\", \"CK\" in the last 168 hours.    Recent Labs   Lab 03/19/24  2354   PTP 12.5   INR 0.89                  Microbiology    No results found for this visit on 03/20/24.      Imaging: Reviewed in Epic.    Medications:    levETIRAcetam  1,500 mg Intravenous Q12H    insulin aspart  1-5 Units Subcutaneous 4 times per day       Assessment & Plan:      # Left frontal intraparenchymal hematoma sp craniotomy 3/21/24  -Keppra for seizure ppx  -Cerebral angiogram without AV malformation or fistula  -MRI brain reviewed  - EEG no seizure   - neurosurgery on consult  -continue ICH protocol, BP goals per neuroCC    - staples to be removed on 4/5  - ok to resume heparin subcut per NS    # dysphagia   - TF ongoing   - SLP  - VSS pending result     # Pneumocephalus,   -Noted on imaging.   - O2     # Vasogenic edema  - mild     # Prediabetes  - hyperglycemia protocol   - ISS  -HgbA1c 6.0.     D/w spouse, RN.      MD Adelina Sellers MD    Supplementary Documentation:     Quality:  DVT Mechanical Prophylaxis:   SCDs,    DVT Pharmacologic Prophylaxis   Medication   None                Code Status: Full Code  Miles: External urinary catheter in place  Miles Duration (in days):   Central line:    LISA:     Discharge is dependent on: course  At this point Ms. Ching is expected to be discharge to: rehab    The 21st Century Cures Act makes medical notes like these available to patients in the interest of transparency. Please be advised this is a medical document. Medical documents are intended to carry relevant information, facts as evident, and the clinical opinion of the practitioner. The medical note is intended as peer to peer communication and may appear blunt or direct. It is written in medical language and may contain abbreviations or verbiage that are unfamiliar.

## 2024-03-25 NOTE — CM/SW NOTE
Chart reviewed for DC planning. Pt transferred from [facility] to [facility] on 3/20 for Neurosurgery/ NeuroIR Consult. Found to have Left frontal intraparenchymal hematoma sp craniotomy 3/21/24. Pt transferred out of unit and now on CTU7.     PT/OT evals pending- pt has not had opportunity yet to work with therapy team. SLP eval yesterday recommended NPO, video swallow pending. Pt currently has DHT to support nutritional needs.     SW to remain available for DC planning needs/recs. THERESA acknowledges MD recs of [facility] and family preference on [facility]. Will need eval and assessment from PT/OT services. Will need to ensure pt able to participate in 3 hours of therapy.     ADDENDUM 3:00- THERESA made aware of PT/OT eval. Recommending [facility]. PMR order entered. Referral sent to [facility] as informed that is pt/family choice.     Addendum: THERESA met with pt spouse outside pt room. Reviewed above. Informed spouse that DC to [facility] is dependent on PMR eval. Pt/spouse have a condo in [place] (zip code [phone]). Condo is one level and their plans are to DC there after rehab stay. Briefly reviewed different levels of care. THERESA will follow up with pt/family after PMR eval.     GUSTABO Fry

## 2024-03-26 LAB
ANION GAP SERPL CALC-SCNC: 7 MMOL/L (ref 0–18)
BUN BLD-MCNC: 13 MG/DL (ref 9–23)
CALCIUM BLD-MCNC: 9.2 MG/DL (ref 8.5–10.1)
CHLORIDE SERPL-SCNC: 108 MMOL/L (ref 98–112)
CO2 SERPL-SCNC: 26 MMOL/L (ref 21–32)
CREAT BLD-MCNC: 0.51 MG/DL
EGFRCR SERPLBLD CKD-EPI 2021: 104 ML/MIN/1.73M2 (ref 60–?)
GLUCOSE BLD-MCNC: 112 MG/DL (ref 70–99)
GLUCOSE BLD-MCNC: 116 MG/DL (ref 70–99)
GLUCOSE BLD-MCNC: 118 MG/DL (ref 70–99)
GLUCOSE BLD-MCNC: 146 MG/DL (ref 70–99)
MAGNESIUM SERPL-MCNC: 2.1 MG/DL (ref 1.6–2.6)
OSMOLALITY SERPL CALC.SUM OF ELEC: 293 MOSM/KG (ref 275–295)
PHOSPHATE SERPL-MCNC: 3.2 MG/DL (ref 2.5–4.9)
POTASSIUM SERPL-SCNC: 3.7 MMOL/L (ref 3.5–5.1)
SODIUM SERPL-SCNC: 141 MMOL/L (ref 136–145)

## 2024-03-26 PROCEDURE — 99232 SBSQ HOSP IP/OBS MODERATE 35: CPT | Performed by: INTERNAL MEDICINE

## 2024-03-26 RX ORDER — LOSARTAN POTASSIUM 25 MG/1
25 TABLET ORAL DAILY
Status: DISCONTINUED | OUTPATIENT
Start: 2024-03-26 | End: 2024-03-29

## 2024-03-26 RX ORDER — LEVETIRACETAM 500 MG/1
1500 TABLET ORAL 2 TIMES DAILY
Qty: 270 TABLET | Refills: 0 | Status: SHIPPED | OUTPATIENT
Start: 2024-03-26

## 2024-03-26 RX ORDER — POTASSIUM CHLORIDE 14.9 MG/ML
20 INJECTION INTRAVENOUS ONCE
Status: COMPLETED | OUTPATIENT
Start: 2024-03-26 | End: 2024-03-26

## 2024-03-26 NOTE — OCCUPATIONAL THERAPY NOTE
OCCUPATIONAL THERAPY TREATMENT NOTE - INPATIENT     Room Number: 7615/7615-A  Session: 1   Number of Visits to Meet Established Goals: 5    Presenting Problem: nontraumatic cortical hemorrhage of left cerebral hemisphere      ASSESSMENT   Patient demonstrates good  progress this session, goals remain in progress.    Patient continues to function below baseline with toileting, bathing, upper body dressing, lower body dressing, grooming, and BUE use, RUE use .   Contributing factors to remaining limitations include decreased functional strength, decreased functional reach, impaired coordination, limited RUE ROM, and impaired use of dominant hand .  Next session anticipate patient to progress toileting, grooming, eating, bed mobility, and transfers.  Occupational Therapy will continue to follow patient for duration of hospitalization.    Patient continues to benefit from continued skilled OT services: to facilitate return to prior level of function as patient demonstrates high motivation with excellent tolerance to an intensive therapy program .               History:   Patient is a 63 year old female admitted on 3/20/2024 with Presenting Problem: nontraumatic cortical hemorrhage of left cerebral hemisphere. Co-Morbidities : Hypokalemia, HTN, Internal hemorrhoids    WEIGHT BEARING RESTRICTION  Weight Bearing Restriction: None                Recommendations for nursing staff:   Transfers: not assessed this session; per previous notes   Toileting location: bed level versus bedside commode    TREATMENT SESSION:  Patient Start of Session: SF position  FUNCTIONAL TRANSFER ASSESSMENT  Sit to Stand: Edge of Bed  Edge of Bed: Maximum Assist (Max A x2 to come to stand then able to stand with max A x1, pt with significant right lean, RUE flaccid and support provided throughout stand, pt compelted MAx A x2 pivot to chair with one person Max A and 2nd person min A for lines and RUE  management)    BED MOBILITY  Rolling: Maximum  Assist  Supine to Sit : Maximum Assist (to get to EOB adn sit at EOB)    BALANCE ASSESSMENT  Static Sitting: Moderate Assist (with support on LUE holding on to bed post)  Sitting Bilateral: Maximum Assist (to maintain sitting with more difficulty to with lean to left)  Static Standing: Maximum Assist (to maintain standing with significant lean to right)  Standing Bilateral: Maximum Assist (unable to advance feet for side steps, only pivot to chair with Max A)    FUNCTIONAL ADL ASSESSMENT  Eating: Moderate Assist (only able to use LUE)  Grooming Seated: Moderate Assist  LB Dressing Seated: Maximum Assist (for socks)      ACTIVITY TOLERANCE: Patient denied shortness of breath or dizziness                         O2 SATURATIONS       EDUCATION PROVIDED  Patient: UE HEP for ROM; UE HEP for Strengthening; Other (neuro-re-education/neuroplasticity)  Patient's Response to Education: Verbalized Understanding      Equipment used: none  Demonstrates functional use, Would benefit from additional trial      Exercises:    Exercises Repetitions Comments   Scapular elevation     Scapular retraction     Shoulder rolls     Shoulder flexion 10 SROM/PROM   Shoulder abduction     Shoulder internal/external rotation     Forward punch     Elbow flexion     Elbow extension     Forearm pronation/supination     Wrist flexion/extension 10    Gross grasp/fist pumps 10    Ankle pumps     Knee extension     Marching       UPPER EXTREMITY  ROM:  RUE with significantly impaired AROM ; AAROM of RUE wrist and fingers WFL; PROM of RUE shoulder and elbow WFL  LUE WFL  Strength:   RUE  2/5, elbow flexion and extension 1/5, shoulder 0/5  LUE NT, observed to be WNL  Coordination  Gross motor: impaired RUE.  LUE WFL  Fine motor: impaired RUE.   LUE WFL  Sensation: patient denied numbness or tingling, not formally tested  Tone: decreased RUE    Therapist comments:   OT provided tactile and verbal cueing for RUE AAROM with muscle tapping for  facilitation of active movement. R wrist and finger extension noted  with repeated cueing and facilitation, followed by reach/grasp patterns with hand over hand support. OT educated patient on neuro-re-ed and neuroplasticity. OT instructed her to visually focus on and attempt to use RUE often for functional tasks . Patient instructed to grasp bedrails on either side  OT assisted her to grasp with RUE and provided cueing and assist for trunk flexion while in log sitting using BUEs , 5 reps. OT provided set up for bed level oral care . Patient able to brush teeth after assisted with opening containers. OT placed toothbrush in to her R hand with MOD to grasp while pt used L hand to dispense toothpaste. OT provided set up for patient to wash face UE used) , and assisted patient to comb and detangle hair. OT performed ocular ROM screening with both eyes WNL. Patient denied blurry ir double vision. Patient was left in SF position with needs met, bed alarm on , and call light in reach. RN aware of session.  Patient End of Session: All patient questions and concerns addressed;RN aware of session/findings;Call light within reach;Needs met;In bed;Alarm set    SUBJECTIVE  \"I'm an \"    PAIN ASSESSMENT  Ratin  Location: no pain at this time        OBJECTIVE  Precautions: None    AM-PAC ‘6-Clicks’ Inpatient Daily Activity Short Form  -   Putting on and taking off regular lower body clothing?: A Lot  -   Bathing (including washing, rinsing, drying)?: A Lot  -   Toileting, which includes using toilet, bedpan or urinal? : A Lot  -   Putting on and taking off regular upper body clothing?: A Lot  -   Taking care of personal grooming such as brushing teeth?: A Little  -   Eating meals?: A Little    AM-PAC Score:  Score: 14  Approx Degree of Impairment: 59.67%  Standardized Score (AM-PAC Scale): 33.39    PLAN  OT Treatment Plan: Balance activities;Energy conservation/work simplification techniques;ADL training;IADL  training;Continued evaluation;Compensatory technique education;Equipment eval/education;Patient/Family training;Patient/Family education;Endurance training;UE strengthening/ROM;Functional transfer training  Rehab Potential : Good  Frequency: 3-5x/week    OT Goals: ongoing 3/26/24  ADL Goals   Patient will perform grooming: with mod assist  Patient will perform upper body dressing:  with min assist     Functional Transfer Goals  Patient will transfer from supine to sit:  with min assist  Patient will transfer from sit to stand:  with mod assist    OT Session Time: 25 minutes  Self-Care Home Management: 15 minutes    Neuromuscular Re-education: 10 minutes

## 2024-03-26 NOTE — PLAN OF CARE
Received pt. In bed on room air.  Lungs clear and diminshed.  Alert and oriented x4.  Continues to have expressive aphasia.  See complete neuro assessment.  Accuchecks have not needed any coverage.  She continues to pull off [place].  Is voiding.  Denies any pain.  Safety precautions in place.

## 2024-03-26 NOTE — PROGRESS NOTES
[facility]  Neurosurgery Progress Note    Leanne Ching Patient Status:  Inpatient    1960 MRN [id number]   Location [facility] 7NE-A Attending Adelina Morris MD   Hosp Day # 6 PCP Shelby Frost MD     Chief Complaint:  Left frontal IPH     POD #4 s/p left frontal intraparenchymal hematoma evacuation     Subjective:  Passed swallow study. Has some head discomfort, but not requesting any medications. Small amount  strength improved from yesterday.     Objective/Physical Exam:    Vital Signs:  Blood pressure 148/82, pulse 92, temperature 99.4 °F (37.4 °C), temperature source Oral, resp. rate 17, height 65\", weight 137 lb 12.6 oz (62.5 kg), SpO2 98%.  Respiratory:  nonlabored  CV:  well perfused  General: NAD, Non fluent speech, Able to say some words but still with difficulty in expression. Mood Appropriate  Neurologic:   A&Ox3  PERRL, EOMi, right droop, right tongue dev  LUE/LE 5/5, RUE 1/5, RLE 3/5  Decreased sensation on the right  Skin: Staples left ZOILA. No signs of infection      Labs:  Recent Labs   Lab 24  2354 24  0437 24  0406   RBC 4.59 4.24 3.95   HGB 13.0 12.4 11.5*   HCT 41.3 37.8 35.1   MCV 90.0 89.2 88.9   MCH 28.3 29.2 29.1   MCHC 31.5 32.8 32.8   RDW 14.0 14.5 14.4   NEPRELIM 4.16 4.29  --    WBC 9.2 7.6 10.0   .0 292.0 290.0       Recent Labs   Lab 24  0417 24  0556 24  0633   * 173* 116*   BUN 19 15 13   CREATSERUM 0.61 0.57 0.51*   EGFRCR 99 101 104   CA 8.4* 8.8 9.2    142 141   K 3.3* 3.6  3.6 3.7   * 111 108   CO2 24.0 26.0 26.0       Imaging:  XR VIDEO SWALLOW (CPT=74230)    Result Date: 3/25/2024  PROCEDURE:  XR VIDEO SWALLOW (CPT=74230)  TECHNIQUE:  Video fluoroscopic swallowing study was performed in cooperation with the speech pathologist.  Barium of varying consistencies was administered orally with patient in the lateral projection.  COMPARISON:  None.  INDICATIONS:  assess swallow physiology   PATIENT STATED HISTORY: (As transcribed by Technologist)  Patient offered no additional history at this time.   CINE CAPTURES:  5 FLUORSCOPY TIME:  2 minutes 20 seconds RADIATION DOSE (AIR KERMA PRODUCT):  112.3 uGy/m2  FINDINGS:  The patient was challenged with thin, nectar, pureed and cracker consistencies by the speech pathologist.  One episode of penetration was observed with thin liquids.  No aspiration.   PLEASE SEE SPEECH PATHOLOGIST REPORT FOR FULL ASSESSMENT OF SWALLOWING MECHANISM.   LOCATION:  [facility]    Dictated by (CST): Hayden Pierce MD on 3/25/2024 at 9:55 AM     Finalized by (CST): Hayden Pierce MD on 3/25/2024 at 9:55 AM       XR CHEST AP PORTABLE  (CPT=71045)    Result Date: 3/25/2024  CONCLUSION:  Tip of Dobbhoff projects in the region of the gastric body, retracted from previous exam performed 3/23/2024.  Clinical service notified of these findings with preliminary radiology report from [facility] services.    LOCATION:  [id number]      Dictated by (CST): Kanchan Harmon MD on 3/25/2024 at 7:51 AM     Finalized by (CST): Kanchan Harmon MD on 3/25/2024 at 7:53 AM       XR CHEST AP PORTABLE  (CPT=71045)    Result Date: 3/23/2024  CONCLUSION:  Dobbhoff tube has been placed, and the radiograph demonstrates the tube with the metal tip within the distal stomach.    LOCATION:  [facility]      Dictated by (CST): Brian Valerio MD on 3/23/2024 at 4:34 PM     Finalized by (CST): Brian Valerio MD on 3/23/2024 at 4:34 PM       MRV HEAD (W+WO)(SEI=90642)    Result Date: 3/23/2024  CONCLUSION:  No venous thrombosis identified.   LOCATION:  [facility]   Dictated by (CST): Brian Valerio MD on 3/23/2024 at 3:15 PM     Finalized by (CST): Brian Valerio MD on 3/23/2024 at 3:17 PM       MRI BRAIN(W+WO)/MRA BRAIN (CPT=70553/30526)    Result Date: 3/23/2024  CONCLUSION:  MRA shows no specific vascular lesion, occlusion or high-grade stenosis.  Complex imaging appearance of the left posterior frontal lobe consistent with subacute  postoperative hematoma, including vasogenic edema, with mild midline shift, but no herniation or hydrocephalus.   LOCATION:  [facility]   Dictated by (CST): Brian Valerio MD on 3/23/2024 at 3:09 PM     Finalized by (CST): Brian Valerio MD on 3/23/2024 at 3:15 PM       CT BRAIN OR HEAD ([id number])    Result Date: 3/22/2024  CONCLUSION:   1. Postoperative changes from left frontoparietal craniotomy with surgical clips along the left scalp are again noted.  There is partial redistribution of the previously visualized postprocedural pneumocephalus most pronounced along the anterior cerebral  convexities bilaterally.  Mixed attenuation extra-axial collection containing fluid, blood, and air along the left cerebral convexity measuring up to 13 mm in thickness, previously 16 mm.  Decreased left-to-right midline shift of approximately 3 mm, previously 6 mm.  2. Redemonstration of changes related to partial evacuation of parenchymal hematoma in the left frontal parietal region the largest residual component again measures approximately 1.4 x 0.5 x 1.6 cm.  A 2nd stable small residual component measures approximately 0.8 x 0.8 x 1.1 cm.  Small amount of edema and air attenuation noted along the operative bed.  Clinical correlation and continued follow-up is suggested.  LOCATION:  [facility]   Dictated by (CST): Husam Martin MD on 3/22/2024 at 6:22 AM     Finalized by (CST): Husam Martin MD on 3/22/2024 at 6:28 AM       XR CHEST AP PORTABLE  (CPT=71045)    Result Date: 3/22/2024  CONCLUSION:  Endotracheal tube tip below thoracic inlet with the tip positioned 2.6 cm from the jakub.  OG tube tip in the stomach  A preliminary report was provided by the [facility] teleradiology service.  LOCATION:  [facility]      Dictated by (CST): Husam Martin MD on 3/22/2024 at 5:47 AM     Finalized by (CST): Husam Martin MD on 3/22/2024 at 5:48 AM       CT BRAIN OR HEAD ([id number])    Result Date: 3/22/2024  CONCLUSION:   1. Interval changes of left  frontoparietal craniotomy with surgical clips along the left scalp.  Moderate amount of postprocedural pneumocephalus most pronounced along left cerebral convexity.  Mixed attenuation extra-axial collection containing fluid and air along the left cerebral convexity measuring up to 16 mm in thickness.  There is left-to-right midline shift of approximately 6 mm.  2. Interval partial evacuation of parenchymal hematoma in the left frontal parietal region the largest residual component measures approximately 1.4 x 0.5 x 1.6 cm, previously 4.0 x 3.2 x 5.0 cm.  A 2nd small residual component measures approximately 0.8  x 0.8 x 1.1 cm.  Small amount of edema and air attenuation noted along the operative bed.  Clinical correlation and continued follow-up is suggested.  A preliminary report was provided by the [facility]eradiology service.  LOCATION:  [id number]   Dictated by (CST): Husam Martin MD on 3/22/2024 at 4:59 AM     Finalized by (CST): Husam Martin MD on 3/22/2024 at 5:03 AM       US INTRAOPERATIVE GUIDANCE (CPT=76998)    Result Date: 3/22/2024  CONCLUSION:  Please see the procedure/operative report for further details.    LOCATION:  [facility]    Dictated by (CST): Stromberg, LeRoy, MD on 3/22/2024 at 0:06 AM     Finalized by (CST): Stromberg, LeRoy, MD on 3/22/2024 at 0:07 AM       CT BRAIN OR HEAD ([id number])    Result Date: 3/21/2024  CONCLUSION:  Slight interval increase in size of left frontal region parenchymal hematoma with measurements given above.  There is mass effect related to this but no midline shift.  Above findings were reported to IONA Adbi on March 21, 2024 at 3:32 p.m..    LOCATION:  [facility]   Dictated by (CST): Jose Alberto Min MD on 3/21/2024 at 3:29 PM     Finalized by (CST): Jose Alberto Min MD on 3/21/2024 at 3:32 PM       CT BRAIN OR HEAD ([id number])    Result Date: 3/21/2024  CONCLUSION:  4.5 x 3.7 x 3.4 cm acute intraparenchymal hematoma arising from the posterior left frontal lobe with localized mass  effect and a small amount of surrounding vasogenic edema and mild midline shift to the right of 3 mm.  No significant change.      LOCATION:  [facility]   Dictated by (CST): Charis Barnes MD on 3/21/2024 at 6:25 AM     Finalized by (CST): Charis Barnes MD on 3/21/2024 at 6:36 AM       CT STROKE CTA BRAIN/CTA NECK (W IV)(CPT=70496/83566)    Result Date: 3/20/2024  CONCLUSION:   Large left frontal intraparenchymal hematoma again noted.  There is associated internal serpiginous enhancement, which may represent an abnormal tangle of vessels.  Finding raises the possibility of an underlying vascular malformation.  Neuro surgical consultation has already been placed at this time.  Further assessment with diagnostic interventional angiography and/or contrast enhanced MRI brain would be helpful.  Local mass effect from left frontal intraparenchymal hematoma as well as left to right 5 millimeter midline shift again noted.  No flow-limiting stenosis within the major arteries of the neck.  Approximately 36% narrowing of the left carotid bulb by atherosclerotic plaque.  No significant narrowing of the right carotid bulb.  No high-grade flow-limiting stenosis within the major arteries of the [id number] intracranially.     Dictated by (CST): Giselle Alexander MD on 3/20/2024 at 8:05 AM     Finalized by (CST): Giselle Alexander MD on 3/20/2024 at 8:22 AM          CT BRAIN OR HEAD ([id number])    Result Date: 3/20/2024  CONCLUSION:  1. Large acute 4.5 x 3.5 cm intraparenchymal hematoma centered at the periphery of the left frontal lobe.  Although the hematoma largely appears similar in size and morphology since examination from approximately 6.5 hours prior, there is a small curvilinear band of acute hemorrhagic components along the posterior margin of the hematoma, which are new since prior.  Mild surrounding vasogenic edema appears unchanged.  Stable left cerebral hemispheric mass effect with mild 2-3 mm left-to-right midline shift.   Continued close follow-up to resolution is advised. 2. Intracranial atherosclerosis.   Dictated by (CST): Finesse Martinez MD on 3/20/2024 at 7:54 AM     Finalized by (CST): Finesse Martinez MD on 3/20/2024 at 7:59 AM          XR CHEST AP PORTABLE  (CPT=71045)    Result Date: 3/20/2024  CONCLUSION:   No radiographic evidence of acute cardiopulmonary process.  A preliminary report was issued by the [facility] teleradiology service. There are no clinically actionable discrepancies.    Dictated by (CST): Giselle Alexander MD on 3/20/2024 at 7:46 AM     Finalized by (CST): Giselle Alexander MD on 3/20/2024 at 7:46 AM          CT STROKE BRAIN (NO IV)(CPT=70450)    Result Date: 3/20/2024  CONCLUSION:   Large 4.8 centimeter left frontal intraparenchymal hematoma with moderate local mass effect, as well as 3 millimeters left-to-right midline shift.  Neurosurgical consultation is recommended.  Follow-up CT brain is recommended.  Differential considerations include underlying vascular etiologies such as AVM, a hemorrhagic mass, or other etiologies.   This report was called immediately at 105 Eastern time on 03/20/2024 hours to Dr. Angeles by Dr Bryant Reynoso   A preliminary report was issued by the [facility] teleradiology service. There are no clinically actionable discrepancies.  Dictated by (CST): Giselle Alexander MD on 3/20/2024 at 7:16 AM     Finalized by (CST): Giselle Alexander MD on 3/20/2024 at 7:23 AM              Assessment:  62 yo female with left frontal IPH s/p hematoma evacuation     Plan:  Keppra per Neuro  PT/OT/SLP - would benefit from acute rehab, family prefers [facility], awaiting PMR eval  MRI brain - no underlying pathology  DVT prophylaxis SCDs, OK for chemoprophylaxis  Staples to be removed after 4/5, ok to remove in rehab  Follow up in 1 month in clinic with a head CT        Is this a shared or split note between Advanced Practice Provider and Physician? Yes       Carmian Banks  APRN  [facility] [place]  3/26/2024, 9:23 AM  [facility] [phone]

## 2024-03-26 NOTE — SLP NOTE
SPEECH DAILY NOTE - INPATIENT    ASSESSMENT & PLAN   ASSESSMENT  Pt seen for dysphagia tx to assess tolerance with recommended diet, ensure appropriate utilization of aspiration precautions and provide pt/family education. Pt found sitting semi-upright in bed; alert & participatory, increased verbal output and intelligible speech noted today. Noon tray present in room and containing recommended soft and bite sized diet with thin liquids. Majority of food remaining on tray and patient stating \"I'm not really hungry.\" Reviewed results of video swallow study, diet recommendations and aspiration precautions with patient and spouse present in room. Good tolerance despite reduced intake reported. Patient agreed to po trials and SLP assisted with sips of thin liquids via cup, pureed and hard solid consistencies. Improved bolus formation and transport with minimal oral residue noted post swallow. Will advance diet to soft with thin liquids. Melodic intonation and work finding strategies given with increased intelligibility and initiation of speech noted. Will continue to follow patient as per plan.     Diet Recommendations - Solids: Soft/ Easy to chew  Diet Recommendations - Liquids: Thin Liquids    Compensatory Strategies Recommended: No straws;Slow rate;Alternate consistencies;Small bites and sips;Extra sauce/gravy  Aspiration Precautions: Upright position;Slow rate;Small bites and sips;No straw  Medication Administration Recommendations: Crushed in puree;Whole in puree    Patient Experiencing Pain: No                Treatment Plan  Treatment Plan/Recommendations: Dysphagia therapy;Communication evaluation;Aspiration precautions    Interdisciplinary Communication:  spouse             GOALS  Goal #1 The patient will tolerate soft & bite sized consistency and small, single sips/no straws thin liquids without overt signs or symptoms of aspiration with 95 % accuracy over 3 session(s). In progress   Goal #2 The  patient/family/caregiver will demonstrate understanding and implementation of aspiration precautions and swallow strategies independently over 3 session(s).    In progress   Goal #3 The patient will utilize compensatory strategies as outlined by  BSSE (clinical evaluation) including Slow rate, Small bites, Small sips, Alternate liquids/solids, No straws, Upright 90 degrees 30 mins after meal, Feed patient, Supervision with meals with mod assistance 100 % of the time across 2 sessions. In progress   Goal #4 Communication evaluation     Not formally addressed         FOLLOW UP  Follow Up Needed (Documentation Required): Yes  SLP Follow-up Date: 03/27/24  Number of Visits to Meet Established Goals: 4    Session: 1    If you have any questions, please contact Gabriella GONZALES, SLP    Gabriella Galdamez MA, CCC-SLP  Pager [phone]

## 2024-03-26 NOTE — DIETARY NOTE
[facility]   part of [facility]    NUTRITION ASSESSMENT    Unable to diagnose malnutrition criteria at this time.      NUTRITION INTERVENTION:    Meal and Snacks - Monitor and encourage adequate PO intake.   Medical Food Supplements - Ensure Plus High Protein Daily.  Coordination of Nutrition Care - SLP consult prior to diet advancement.  Enteral Nutrition - Discontinued per MD    PATIENT STATUS: 3/26 - Pt sitting in chair, eating breakfast at time of visit.  at bedside. DHT removed yesterday after pt passed VFSS for chopped/bite sized, thin liquids.  Pt has expressive aphasia, but able to answer questions.  Denies n/v or stomach pain. Last BM PTA.  Pt reports she follows a vegetarian diet normally, eats 1-2x per day typically.  She states she has lost a few pounds PTA, but nothing significant.  Encouraged PO for strength and healing, offered ONS, pt agreeable to try. All questions answered at time of visit.     3/23/24- 62 y/o female admitted with slurred speech and RUE weakness. Found to have L frontal intraparenchymal hematoma. S/p craniotomy on 3/21. Extubated on 3/22. NPO x 3 days.   Received nutrition consult for TF recs. DHT placed per RN report. See above for TF recs. Noted SLP eval today with diet recs for NPO.   PMH:HTN, HLD, diverticulosis.       ANTHROPOMETRICS:  Ht: 165.1 cm (5' 5\")  Wt: 62.5 kg (137 lb 12.6 oz).   BMI: Body mass index is 22.93 kg/m².  IBW: 56.8 kg      WEIGHT HISTORY:   Weight loss: No  Wts vary this admit:126 lb-137 lb.    Wt Readings from Last 10 Encounters:   03/23/24 62.5 kg (137 lb 12.6 oz)   03/20/24 57.4 kg (126 lb 8.7 oz)   05/17/18 71.7 kg (158 lb)   05/09/18 71.7 kg (158 lb)        NUTRITION:  Diet: Low Fiber / Soft / Bite Sized, Thin Liquids     Food Allergies: No  Cultural/Ethnic/Taoism Preferences Addressed: Yes    Percent Meals Eaten (last 3 days)       Date/Time Percent Meals Eaten (%)    03/25/24 1402 60 %            GI system review: WNL and Last  BM: PTA    Skin and wounds: Surgical incision on L head,     NUTRITION RELATED PHYSICAL FINDINGS:     1. Body Fat/Muscle Mass:  REBECA at this time     2. Fluid Accumulation: none per RN documentation     NUTRITION PRESCRIPTION: 62.5 kg  Calories: 1568-9651 calories/day (25-30 kcal/kg)  Protein: 75-94 grams protein/day (1.2-1.5 grams protein per kg)  Fluid: ~1 ml/kcal or per MD discretion    NUTRITION DIAGNOSIS/PROBLEM:  Increased nutrient needs related to increased nutritional demands for healing as evidenced by diagnosis consistent with elevated nutritional needs    MONITOR AND EVALUATE/NUTRITION GOALS:  PO intake of 75% of meals TID - New  PO intake of 75% of oral nutrition supplement/s - New  Weight stable within 1 to 2 lbs during admission - Ongoing  Provide nutrition adequate for wound healing - Ongoing  Start alternative nutrition in 24-48 hrs if diet is not able to advance- Resolved  Tolerate alternative nutrition at 100% of goal - Resolved      MEDICATIONS:  reviewed    LABS:  POC Glu:115-164    Pt is at High nutrition risk    Citlali Alex RD, LDN, CNSC  Clinical Dietitian  Phone [phone]

## 2024-03-26 NOTE — CM/SW NOTE
PMR scheduled to see pt today, will be in afternoon/evening hours. [facility] reviewing referral. Will need PMR eval prior to [facility] accepting.     ADDENDUM: PMR eval recommends AR. Reserved [facility]-- working on DC tomorrow.  Will need CD of imaging.     GUSTBAO Fry

## 2024-03-26 NOTE — CONSULTS
.[facility]    Leanne Ching Patient Status:  Inpatient    1960 MRN [id number]   Location [facility] 7NE-A Attending Adelina Morris MD   Hosp Day # 6 PCP Shelby Frost MD     Patient Identification  Leanne Ching is a 63 year old female.  :  1960  Admit Date:  3/20/2024  Attending Provider:  Adelina Morris MD                                  Primary Care Physician:  Shelby Frost MD   Admitting Diagnosis: ICH (intracerebral hemorrhage) (HCC) [I61.9]    Subjective:      Reason for Consultation: Impaired ADL and mobility dysfuction due to Left craniotomy for intraparenchymal hematoma evacuation  History of present illness:  Records reviewed, and patient examined.Consult Requested by:     Leanne Ching is a 63 year old female with history of hypertension, hyperlipidemia presented to [facility] 3/20/24t with sudden onset of slurred speech and right arm weakness.     CT/CTA 3/20/24 CONCLUSION:    Large left frontal intraparenchymal hematoma again noted.  There is associated internal serpiginous enhancement, which may represent an abnormal tangle of vessels.  Finding raises the possibility of an underlying vascular malformation.  Neuro surgical   consultation has already been placed at this time.  Further assessment with diagnostic interventional angiography and/or contrast enhanced MRI brain would be helpful.    Local mass effect from left frontal intraparenchymal hematoma as well as left to right 5 millimeter midline shift again noted.     Was transferred to [facility] for Neurosurgical intervention.    Angio 3/21 neg vasc malformation, mri/a/v brain w/wo neg for vasc malformation/mass.     On 3/22/24 underwent Left craniotomy for intraparenchymal hematoma evacuation by Dr.Heiferman Hall [facility] for seizure proph.  VEEG Impression:  This is a Abnormal prolonged Video EEG study.   Bilateral independent temporal sharp wave activity was noted. These waveforms are  epileptogenic in nature. No electrographic seizures were noted.   Intermittent slow wave activity in delta range was also noted.  At times these waveforms were quite rhythmic since raising suspicions for electrographic seizures.  No clinical manifestations were noted.  The overall frequency and duration of these activity significantly decreased after increasing AED.  These findings can also be seen in encephalopathy due to metabolic/toxic etiology, medication effects or diffuse cerebral injury.    Clinical correlation is recommended    Per NS, OK for chemoprophylaxis     Has headaches and expressive aphasia.  Tolerating MS/thins  No seizures/emesis      Physiatry consult obtained now to assess pt's funtional status and make appropriate recommendations.      HOME SITUATION  Type of Home: House   Home Layout: Two level  Stairs to Enter : 5  Stairs to Bedroom: 8  Railing: Yes     Lives With: Spouse  Patient Owned Equipment: None     Prior Level of [place]: Per EMR, pt lives at home with her spouse. Pt is IND with all ADLs and does not use an assisted device to ambulate.    Current Functional Status:     Bed Mobility:  Rolling: Mod A  Supine to sit: Mod A          Sit to supine: NT                  Transfer Mobility:  Sit to stand: Mod A Pt was given verbal and tactile cues for improve hand placement and feet placement  Stand to sit: Mod A  Gait = NT    Past Medical History:  @Medical hx@  Past Medical History:   Diagnosis Date    Diverticulosis large intestine w/o perforation or abscess w/o bleeding 2018    High cholesterol     Hyperlipidemia     Hypertension 3/21/2024    Internal hemorrhoids 2018       Past Surgical History:   Procedure Laterality Date    ABLATION      COLONOSCOPY N/A 2018    Procedure: COLONOSCOPY;  Surgeon: Jacinda Cruz MD;  Location: [facility] ENDOSCOPY    EEG PHY/QHP EA INCR W/VEEG  3/24/2024          x 2        potassium chloride 20 mEq/100mL IVPB premix 20  mEq  20 mEq Intravenous Once    ipratropium-albuterol (Duoneb) 0.5-2.5 (3) MG/3ML inhalation solution 3 mL  3 mL Nebulization Q6H PRN    enoxaparin (Lovenox) 40 MG/0.4ML SUBQ injection 40 mg  40 mg Subcutaneous Daily    [COMPLETED] potassium phosphate dibasic 15 mmol in sodium chloride 0.9% 250 mL IVPB  15 mmol Intravenous Once    Followed by    [COMPLETED] potassium chloride 20 mEq/100mL IVPB premix 20 mEq  20 mEq Intravenous Once    [COMPLETED] gadoterate meglumine (Dotarem) 7.5 MMOL/15ML injection 15 mL  15 mL Intravenous ONCE PRN    [COMPLETED] levETIRAcetam (Keppra) 3,750 mg in sodium chloride 0.9% 100 mL IVPB  60 mg/kg Intravenous Once    And    levETIRAcetam (Keppra) 500 mg/5mL injection 1,500 mg  1,500 mg Intravenous Q12H    glucose (Dex4) 15 GM/59ML oral liquid 15 g  15 g Oral Q15 Min PRN    Or    glucose (Glutose) 40% oral gel 15 g  15 g Oral Q15 Min PRN    Or    glucose-vitamin C (Dex-4) chewable tab 4 tablet  4 tablet Oral Q15 Min PRN    Or    dextrose 50% injection 50 mL  50 mL Intravenous Q15 Min PRN    Or    glucose (Dex4) 15 GM/59ML oral liquid 30 g  30 g Oral Q15 Min PRN    Or    glucose (Glutose) 40% oral gel 30 g  30 g Oral Q15 Min PRN    Or    glucose-vitamin C (Dex-4) chewable tab 8 tablet  8 tablet Oral Q15 Min PRN    senna (Senokot) 8.8 MG/5ML oral syrup 17.6 mg  10 mL Oral Nightly PRN    labetalol (Trandate) 5 mg/mL injection 10 mg  10 mg Intravenous Q4H PRN    insulin aspart (NovoLOG) 100 Units/mL FlexPen 1-5 Units  1-5 Units Subcutaneous 4 times per day    [COMPLETED] potassium chloride 40 mEq in 250mL sodium chloride 0.9% IVPB premix  40 mEq Intravenous Once    midazolam (Versed) 2 MG/2ML injection 2 mg  2 mg Intravenous Q15 Min PRN    traMADol (Ultram) tab 50 mg  50 mg Oral Q6H PRN    [COMPLETED] potassium chloride 40 mEq in 250mL sodium chloride 0.9% IVPB premix  40 mEq Intravenous Once    [COMPLETED] fentaNYL (Sublimaze) 50 mcg/mL injection        [COMPLETED] midazolam (Versed) 2 MG/2ML  injection        [COMPLETED] heparin (Porcine) 5000 UNIT/ML injection        [COMPLETED] heparin (Porcine) 5000 UNIT/ML injection        [COMPLETED] lidocaine (Xylocaine) 1 % injection        [COMPLETED] nitroGLYCERIN (Nitrobid) 2 % ointment        [COMPLETED] heparin (Porcine) 5000 UNIT/ML injection        [COMPLETED] verapamil (Isoptin) 2.5 mg/mL injection        [COMPLETED] Nitroglycerin in D5W 200-5 MCG/ML-% injection        [COMPLETED] iodixanol (VISIPAQUE) 320 MG/ML injection 300 mL  300 mL Injection ONCE PRN    niCARdipine in sodium chloride 0.86% (carDENE) 20 mg/200mL infusion premix  5-15 mg/hr Intravenous Continuous PRN    [COMPLETED] LORazepam (Ativan) 2 mg/mL injection 2 mg  2 mg Intravenous Once    [COMPLETED] iopamidol (ISOVUE-370) 76 % injection 50 mL  50 mL Intravenous ONCE PRN    [COMPLETED] labetalol (Trandate) 5 mg/mL injection 20 mg  20 mg Intravenous Once    [COMPLETED] labetalol (Trandate) 5 mg/mL injection 10 mg  10 mg Intravenous Once    sodium chloride 0.9% infusion   Intravenous Continuous    [COMPLETED] labetalol (Trandate) 5 mg/mL injection 10 mg  10 mg Intravenous Q10 Min PRN    hydrALAzine (Apresoline) 20 mg/mL injection 10 mg  10 mg Intravenous Q2H PRN    ondansetron (Zofran) 4 MG/2ML injection 4 mg  4 mg Intravenous Q6H PRN    Or    metoclopramide (Reglan) 5 mg/mL injection 10 mg  10 mg Intravenous Q8H PRN    melatonin tab 3 mg  3 mg Oral Nightly PRN    polyethylene glycol (PEG 3350) (Miralax) 17 g oral packet 17 g  17 g Oral Daily PRN    sennosides (Senokot) tab 17.2 mg  17.2 mg Oral Nightly PRN    bisacodyl (Dulcolax) 10 MG rectal suppository 10 mg  10 mg Rectal Daily PRN    fleet enema (Fleet) 7-19 GM/118ML rectal enema 133 mL  1 enema Rectal Once PRN       Social History     Tobacco Use    Smoking status: Never    Smokeless tobacco: Never   Substance Use Topics    Alcohol use: No       Family History   Problem Relation Age of Onset    Cancer Paternal Grandmother 70        UTERINE        Allergies:  No Known Allergies        Lab Results   Component Value Date    CREATSERUM 0.51 03/26/2024    BUN 13 03/26/2024     03/26/2024    K 3.7 03/26/2024     03/26/2024    CO2 26.0 03/26/2024     03/26/2024    CA 9.2 03/26/2024    MG 2.1 03/26/2024    PHOS 3.2 03/26/2024    PGLU 118 03/26/2024       Review of Systems:  Complete review of systems completed/14 point.  Negative except as that outlined in HPI    OBJECTIVE:    Blood pressure 148/82, pulse 92, temperature 99.4 °F (37.4 °C), temperature source Oral, resp. rate 17, height 5' 5\" (1.651 m), weight 137 lb 12.6 oz (62.5 kg), SpO2 98%.    Intake/Output Summary (Last 24 hours) at 3/26/2024 1159  Last data filed at 3/26/2024 1054  Gross per 24 hour   Intake 870 ml   Output 1700 ml   Net -830 ml       Physical Exam:                                      General: Alert, cooperative, no distress, appears stated age.  Head:  Normocephalic, without obvious abnormality, atraumatic.   Eyes:  Conjunctivae/lids clear. PERRL, EOMs intact. Vision functional.   Ears/Nose/Throat: Hearing intact. Lips, mucosa, and tongue normal. Teeth and gums normal. Moist mucous membranes.     Neck: No neck masses or thyroid enlargement/tenderness/nodules.       Lungs:   Resonant, clear breath sounds, quiet accessory muscles.   Chest wall:  No tenderness or deformity.   Cardiovascular:  Heart with regular rate rhythm, no murmurs appreciated. Radial and pedal pulses good. No cyanosis in all extremities.   Abdomen:   No tenderness guarding or rigidity. Liver and spleen are not enlarged.  Bowel sounds present.                   Musculoskeletal:     Right Upper Extremity:  Strength is 0-1.  ROM WNL.   Left Upper Extremity:  Strength is 4.  ROM WNL.   Right Lower Extremity:  Strength  is 0-1.   ROM WNL.   Left Lower Extremity: Strength  is 5.   ROM WNL.          Neuro: CNII-XII are grossly intact.                   Psychiatric: Awake, alert and aphasic. Following  simple commands        Assessment:                                Rehab diagnosis: ADL and  mobility dysfunction due to Left craniotomy for intraparenchymal hematoma evacuation    Disposition:     Based on pt's current functional and medical status, Acute inpatient rehabilitation is recommended to maximize patient's independence, provide care giver training, and evaluate for home equipment needs with ELOS 3-4 weeks.         Patient would benefit and is able to participate in 3 hours of therapy daily. Patient is anticipated to discharge to 24 hr care when acute inpatient rehabilitation course is complete.        Thank you for the consult.     Nia Otto MD  [facility].

## 2024-03-26 NOTE — PROGRESS NOTES
[facility]   part of [facility]     Hospitalist Progress Note     Leanne Ching Patient Status:  Inpatient    1960 MRN [id number]   Location [facility] 6NE-A Attending Adelina Morris MD   Hosp Day # 6 PCP Shelby Frost MD     Chief Complaint: moving right side more     Subjective:     Patient is more awake,eating without difficulty. Moving right side more.     Objective:    Review of Systems:   A comprehensive review of systems was completed; pertinent positive and negatives stated in subjective.    Vital signs:  Temp:  [97.3 °F (36.3 °C)-99.5 °F (37.5 °C)] 98.2 °F (36.8 °C)  Pulse:  [] 74  Resp:  [15-23] 18  BP: (133-150)/(72-87) 133/77  SpO2:  [93 %-100 %] 96 %    Physical Exam:    General: No acute distress  Respiratory: No wheezes, no rhonchi  Cardiovascular: S1, S2, regular rate and rhythm  Abdomen: Soft, Non-tender, non-distended, positive bowel sounds  Neuro:right facial droop, dysarthria, following commands, PERRL, right side slightly better , able to lift right foot and leg off the bed.   Extremities: No edema      Diagnostic Data:    Labs:  Recent Labs   Lab 24  0437 24  0406   WBC 9.2 7.6 10.0   HGB 13.0 12.4 11.5*   MCV 90.0 89.2 88.9   .0 292.0 290.0   INR 0.89  --   --        Recent Labs   Lab 247 24  0406 24  0501 24  0417 24  0556   *   < > 164*  --  144* 173*   BUN 14   < > 14  --  19 15   CREATSERUM 0.82   < > 0.49*  --  0.61 0.57   CA 10.2   < > 8.1*  --  8.4* 8.8   ALB 4.9*  --   --   --   --   --       < > 141  --  141 142   K 4.0   < > 3.3* 3.5 3.3* 3.6  3.6      < > 116*  --  113* 111   CO2 26.0   < > 21.0  --  24.0 26.0   ALKPHO 66  --   --   --   --   --    AST 25  --   --   --   --   --    ALT 15  --   --   --   --   --    BILT 0.5  --   --   --   --   --    TP 7.9  --   --   --   --   --     < > = values in this interval not displayed.       Estimated  Creatinine Clearance: 88.5 mL/min (based on SCr of 0.57 mg/dL).    No results for input(s): \"TROP\", \"TROPHS\", \"CK\" in the last 168 hours.    Recent Labs   Lab 03/19/24  2354   PTP 12.5   INR 0.89                  Microbiology    No results found for this visit on 03/20/24.      Imaging: Reviewed in Epic.    Medications:    enoxaparin  40 mg Subcutaneous Daily    levETIRAcetam  1,500 mg Intravenous Q12H    insulin aspart  1-5 Units Subcutaneous 4 times per day       Assessment & Plan:      # Left frontal intraparenchymal hematoma sp craniotomy 3/21/24  -Keppra for seizure ppx-> cont until neuro follow up in clinic. D/w Dr kemp   -Cerebral angiogram without AV malformation or fistula  -MRI brain reviewed  - EEG no seizure   - neurosurgery on consult  -continue ICH protocol, BP goals per neuroCC    - staples to be removed on 4/5  - ok to resume heparin subcut per NS    # dysphagia   - TF removed   - aspiraition precaution   - on oral diet    - SLP    # Pneumocephalus  -Noted on imaging.   - O2 provided      # Vasogenic edema  - mild     # Prediabetes  - hyperglycemia protocol   - ISS  -HgbA1c 6.0.     # HTN, add losartan     D/w spouse, RN. DC PLANNING TO Acute rehab when arranged.     MD Adelina Sellers MD    Supplementary Documentation:     Quality:  DVT Mechanical Prophylaxis:   SCDs,    DVT Pharmacologic Prophylaxis   Medication    enoxaparin (Lovenox) 40 MG/0.4ML SUBQ injection 40 mg                Code Status: Full Code  Miles: External urinary catheter in place  Miles Duration (in days):   Central line:    LISA: 3/26/2024    Discharge is dependent on: course  At this point Ms. Ching is expected to be discharge to: rehab    The 21st Century Cures Act makes medical notes like these available to patients in the interest of transparency. Please be advised this is a medical document. Medical documents are intended to carry relevant information, facts as evident, and the clinical opinion of the  practitioner. The medical note is intended as peer to peer communication and may appear blunt or direct. It is written in medical language and may contain abbreviations or verbiage that are unfamiliar.

## 2024-03-26 NOTE — PLAN OF CARE
Assumed care at 0730  Pt A/O x4. Follows commands   On RA  Q4 neuros, see flowsheet   Seizure precautions in place   Up x 2 max to the chair  Worked with OT  Potassium replaced per order   Pt and family updated on plan of care. All questions answered  Call light and fall precautions in place

## 2024-03-27 LAB
ANION GAP SERPL CALC-SCNC: 5 MMOL/L (ref 0–18)
BUN BLD-MCNC: 19 MG/DL (ref 9–23)
CALCIUM BLD-MCNC: 9.6 MG/DL (ref 8.5–10.1)
CHLORIDE SERPL-SCNC: 108 MMOL/L (ref 98–112)
CO2 SERPL-SCNC: 26 MMOL/L (ref 21–32)
CREAT BLD-MCNC: 0.55 MG/DL
EGFRCR SERPLBLD CKD-EPI 2021: 102 ML/MIN/1.73M2 (ref 60–?)
ERYTHROCYTE [DISTWIDTH] IN BLOOD BY AUTOMATED COUNT: 13.5 %
GLUCOSE BLD-MCNC: 101 MG/DL (ref 70–99)
GLUCOSE BLD-MCNC: 108 MG/DL (ref 70–99)
GLUCOSE BLD-MCNC: 111 MG/DL (ref 70–99)
GLUCOSE BLD-MCNC: 114 MG/DL (ref 70–99)
GLUCOSE BLD-MCNC: 116 MG/DL (ref 70–99)
GLUCOSE BLD-MCNC: 147 MG/DL (ref 70–99)
HCT VFR BLD AUTO: 38.4 %
HGB BLD-MCNC: 12.7 G/DL
MCH RBC QN AUTO: 29.3 PG (ref 26–34)
MCHC RBC AUTO-ENTMCNC: 33.1 G/DL (ref 31–37)
MCV RBC AUTO: 88.7 FL
OSMOLALITY SERPL CALC.SUM OF ELEC: 291 MOSM/KG (ref 275–295)
PLATELET # BLD AUTO: 371 10(3)UL (ref 150–450)
POTASSIUM SERPL-SCNC: 4.4 MMOL/L (ref 3.5–5.1)
POTASSIUM SERPL-SCNC: 4.5 MMOL/L (ref 3.5–5.1)
RBC # BLD AUTO: 4.33 X10(6)UL
SODIUM SERPL-SCNC: 139 MMOL/L (ref 136–145)
TRIGL SERPL-MCNC: 182 MG/DL (ref 30–149)
WBC # BLD AUTO: 5.3 X10(3) UL (ref 4–11)

## 2024-03-27 PROCEDURE — 99232 SBSQ HOSP IP/OBS MODERATE 35: CPT | Performed by: STUDENT IN AN ORGANIZED HEALTH CARE EDUCATION/TRAINING PROGRAM

## 2024-03-27 RX ORDER — LOSARTAN POTASSIUM 25 MG/1
25 TABLET ORAL DAILY
Qty: 30 TABLET | Refills: 0 | Status: SHIPPED | OUTPATIENT
Start: 2024-03-28 | End: 2024-04-27

## 2024-03-27 RX ORDER — EZETIMIBE 10 MG/1
10 TABLET ORAL NIGHTLY
Status: ON HOLD | COMMUNITY
End: 2024-03-28

## 2024-03-27 NOTE — PHYSICAL THERAPY NOTE
PHYSICAL THERAPY TREATMENT NOTE - INPATIENT    Room Number: 7615/7615-A     Session: 1    Number of Visits to Meet Established Goals: 3    Presenting Problem: Nontraumatic cortical hemorrhage of L cerebral hemisphere  Co-Morbidities : Hypokalemia, HTN, Internal hemorrhoids    ASSESSMENT   Patient demonstrates limited progress this session, goals  remain in progress.    Patient continues to function below baseline with bed mobility, transfers, gait, stair negotiation, and maintaining seated position.  Contributing factors to remaining limitations include decreased functional strength, decreased endurance/aerobic capacity, impaired   balance, impaired coordination, impaired motor planning, and medical status.  Next session anticipate patient to progress transfers and gait.  Physical Therapy will continue to follow patient for duration of hospitalization.    Patient continues to benefit from continued skilled PT services: to facilitate return to prior level of function as patient demonstrates high motivation with excellent tolerance to an intensive therapy program .    PLAN  PT Treatment Plan: Bed mobility;Body mechanics;Endurance;Gait training;Strengthening;Stair training;Transfer training;Balance training  Rehab Potential : Good  Frequency (Obs): 3-5x/week    CURRENT GOALS   Goal #1 Patient is able to demonstrate supine - sit EOB @ level: minimum assistance     Goal #2 Patient is able to demonstrate transfers EOB to/from BSC at assistance level: minimum assistance     Goal #3 Patient is able to ambulate 50 feet with assist device: walker - rolling at assistance level: minimum assistance     Goal #4    Goal #5    Goal #6    Goal Comments: Goals established on 3/25/2024    3/27/2024 all goals ongoing .    SUBJECTIVE  \" I am doing better.\"    OBJECTIVE  Precautions: None    WEIGHT BEARING RESTRICTION  Weight Bearing Restriction: None                PAIN ASSESSMENT   Ratin  Location: Pt reported no pain        BALANCE                                                                                                                       Static Sitting: Fair -  Dynamic Sitting: Poor +           Static Standing: Poor  Dynamic Standing: Poor -    ACTIVITY TOLERANCE                         O2 WALK         AM-PAC '6-Clicks' INPATIENT SHORT FORM - BASIC MOBILITY  How much difficulty does the patient currently have...  Patient Difficulty: Turning over in bed (including adjusting bedclothes, sheets and blankets)?: A Lot   Patient Difficulty: Sitting down on and standing up from a chair with arms (e.g., wheelchair, bedside commode, etc.): A Lot   Patient Difficulty: Moving from lying on back to sitting on the side of the bed?: A Lot   How much help from another person does the patient currently need...   Help from Another: Moving to and from a bed to a chair (including a wheelchair)?: A Lot   Help from Another: Need to walk in hospital room?: Total   Help from Another: Climbing 3-5 steps with a railing?: Total       AM-PAC Score:  Raw Score: 10   Approx Degree of Impairment: 76.75%   Standardized Score (AM-PAC Scale): 32.29   CMS Modifier (G-Code): CL    FUNCTIONAL ABILITY STATUS  Gait Assessment   Functional Mobility/Gait Assessment  Gait Assistance: Not tested    Skilled Therapy Provided    Bed Mobility:  Rolling: min assist   Supine<>Sit: max   Sit<>Supine: NT     Transfer Mobility:  Sit<>Stand: Mod assist   Stand<>Sit: mod assist for eccentric control.    Gait: one ue support on bed rail, step forward and back with RLE and LLE. Patient shows R knee dec quad control and difficulty with advancement. Assist provided.     Therapist's Comments: patient worked in sit to stand training. Facilitated weight shift in standing position ant<> post and side to side. Patient needed L ue support on stable surface. Cues provided and R knee supported to prevent buckling.   Seated balance training performed - wt shift ant<>post, lateral and  head turns to improve R side awareness.   Jorge ue and le ROM performed.  R UE flaccid.         Patient End of Session: Up in chair;Needs met;Call light within reach;RN aware of session/findings;All patient questions and concerns addressed;Family present    PT Session Time: 33 minutes  Gait Trainin minutes  Therapeutic Activity: 15 minutes  Therapeutic Exercise: 5 minutes   Neuromuscular Re-education: 15 minutes

## 2024-03-27 NOTE — PROGRESS NOTES
[facility]   part of [facility]     Hospitalist Progress Note     Leanne Ching Patient Status:  Inpatient    1960 MRN [id number]   Location [facility] 7NE-A Attending Rene Dueñas,    Hosp Day # 7 PCP Shelby Frost MD     Chief Complaint: Slurred speech and RUE weakness     Subjective:     Patient feels better this morning compared to yesterday     Objective:    Review of Systems:   A comprehensive review of systems was completed; pertinent positive and negatives stated in subjective.    Vital signs:  Temp:  [98.1 °F (36.7 °C)-99.3 °F (37.4 °C)] 99.3 °F (37.4 °C)  Pulse:  [66-98] 89  Resp:  [16-18] 16  BP: (126-152)/(78-98) 126/94  SpO2:  [68 %-99 %] 97 %    Physical Exam:    General: No acute distress  Respiratory: No wheezes, no rhonchi  Cardiovascular: S1, S2, regular rate and rhythm  Abdomen: Soft, Non-tender, non-distended, positive bowel sounds  Neuro: Right facial droop, dysarthria, right-sided weakness  Extremities: No edema    Diagnostic Data:    Labs:  Recent Labs   Lab 24  0437 24  0406 24  1056   WBC 7.6 10.0 5.3   HGB 12.4 11.5* 12.7   MCV 89.2 88.9 88.7   .0 290.0 371.0       Recent Labs   Lab 24  0556 24  0633 24  0620 24  1057   * 116*  --  116*   BUN 15 13  --  19   CREATSERUM 0.57 0.51*  --  0.55   CA 8.8 9.2  --  9.6    141  --  139   K 3.6  3.6 3.7 4.4 4.5    108  --  108   CO2 26.0 26.0  --  26.0       Estimated Creatinine Clearance: 91.8 mL/min (based on SCr of 0.55 mg/dL).    No results for input(s): \"TROP\", \"TROPHS\", \"CK\" in the last 168 hours.    No results for input(s): \"PTP\", \"INR\" in the last 168 hours.               Microbiology    No results found for this visit on 24.      Imaging: Reviewed in Epic.    Medications:    losartan  25 mg Oral Daily    enoxaparin  40 mg Subcutaneous Daily    levETIRAcetam  1,500 mg Intravenous Q12H    insulin aspart  1-5 Units Subcutaneous 4 times per day        Assessment & Plan:      #Left frontal intraparenchymal hematoma  -S/p craniotomy 3/21  -MRI brain reviewed  -EEG reviewed  -Cerebral angiogram shows no AV for formation and fistula  -ICH protocol  -Keppra   -Staples to be removed 4/5   -Neurosurgery following     #Dysphagia, resolved   -Tolerating oral diet     #Pneumocephalus   -Noted on imaging   -O2 provided    #Vasogenic edema   -Mild     #Prediabetes   -Hyperglycemia protocol     #Hypertension  -Losartan     DC planning; awaiting placement to acute rehab     Rene Dueñas DO    Supplementary Documentation:     Quality:  DVT Mechanical Prophylaxis:   SCDs,    DVT Pharmacologic Prophylaxis   Medication    enoxaparin (Lovenox) 40 MG/0.4ML SUBQ injection 40 mg                Code Status: Full Code  Miles: External urinary catheter in place  Miles Duration (in days):   Central line:    LISA: 3/27/2024    Discharge is dependent on: Clinical improvement   At this point Ms. Ching is expected to be discharge to: Home     The 21st Century Cures Act makes medical notes like these available to patients in the interest of transparency. Please be advised this is a medical document. Medical documents are intended to carry relevant information, facts as evident, and the clinical opinion of the practitioner. The medical note is intended as peer to peer communication and may appear blunt or direct. It is written in medical language and may contain abbreviations or verbiage that are unfamiliar.

## 2024-03-27 NOTE — PLAN OF CARE
Assumed care around 2300  Expressive aphasia, R facial droop, tongue deviation and weakness present  L head incision c/d/I ZOILA    [facility]

## 2024-03-27 NOTE — PROGRESS NOTES
INPATIENT PROGRESS NOTE    Assessment:   Leanne Ching in room 7615/7615-A, is a 63 year old female, Hospital Day ( LOS: 7 days ) hospitalized for Nontraumatic cortical hemorrhage of left cerebral hemisphere (HCC).    Post-Op Day 6 Days Post-Op s/p Procedure(s):  LEFT CRANIOTOMY FOR HEMATOMA EVACUATION WITH STEALTH NAVIAGTION    The patient's other hospital problems include:   Active Hospital Problems    Hypokalemia      Dysphagia      Encephalopathy      Hematoma      *Nontraumatic cortical hemorrhage of left cerebral hemisphere (HCC)      Hypertension      Vasogenic brain edema (HCC)      Intracranial bleed (HCC)      Hypertensive emergency        Plan:     1.  PT/OT  2.  Patient is recommended for acute inpatient rehab following evaluation with PMR  3.  Keppra management per neurology  4.  Staples to be removed 4/5, may be removed at rehab  5.  Will discuss with Dr. Romero    The patient was seen and examined.  Patient agreed to the plan, verbalized understanding was very appreciative.    Subjective:   Patient endorses no new neurologic complaints. States she's continuing to improve. Awaiting rehab.     Objective:   I&O: I/O last 3 completed shifts:  In: 360 [P.O.:360]  Out: 1550 [Urine:1550]     VITALS: BP (!) 126/94 (BP Location: Right arm)   Pulse 89   Temp 99.3 °F (37.4 °C) (Oral)   Resp 16   Ht 65\"   Wt 137 lb 12.6 oz (62.5 kg)   SpO2 97%   BMI 22.93 kg/m²  Temp (24hrs), Av.7 °F (37.1 °C), Min:98.1 °F (36.7 °C), Max:99.3 °F (37.4 °C)     Incision: Clean, dry and intact.  Staples in place.  Healing well.   DVT Evaluation:  SCDs on     Clinical exam:  The patient is awake and alert.  She states her name.  Answers questions appropriately.  Dysarthria and intermittent expressive aphasia noted.  Expressive aphasia with improvement from initial consultation at [place].  Follows commands. Right facial droop noted.   Breathing easy and even.  Skin warm and dry.  RUE 1/5 except  and intrinsics 2/5.  RLE 4/5, LUE and LLE 5/5     Imaging reviewed: No recent imaging      Camille Seymour M.S., PA-C  [address], [place] [address]  [phone]  3/27/2024 3:29 PM    Dragon speech recognition software was used to prepare this note. If a word or phrase is confusing, it is likely due to a failure of recognition. Please contact me with any questions or clarifications.    Is this a shared or split note between Advanced Practice Provider and Physician? Yes

## 2024-03-27 NOTE — CM/SW NOTE
Message sent to [facility] at this time to inquire if able to accept today, pending medical clearance. MAR and clinical attached.     Will update pt/family and treatment team as SW receives updates.    ADDENDUM: CBC/BMP needed prior to DC. Noted pt has not had a BM since admission, message sent to RN/MD to inquire if bowel regime can start.     1:45- bed not available today at [facility]. Notified pt , will reconvene tomorrow with [facility] pending BM.     GUSTABO Fry

## 2024-03-27 NOTE — DISCHARGE INSTRUCTIONS
OK to removed staples in rehab on 4/5    Diet Recommendations - Solids: Soft/ Easy to chew  Diet Recommendations - Liquids: Thin Liquids     Compensatory Strategies Recommended: No straws;Slow rate;Alternate consistencies;Small bites and sips;Extra sauce/gravy  Aspiration Precautions: Upright position;Slow rate;Small bites and sips;No straw  Medication Administration Recommendations: Crushed in puree;Whole in puree

## 2024-03-28 LAB
GLUCOSE BLD-MCNC: 116 MG/DL (ref 70–99)
GLUCOSE BLD-MCNC: 130 MG/DL (ref 70–99)
GLUCOSE BLD-MCNC: 133 MG/DL (ref 70–99)
GLUCOSE BLD-MCNC: 90 MG/DL (ref 70–99)

## 2024-03-28 PROCEDURE — 99232 SBSQ HOSP IP/OBS MODERATE 35: CPT | Performed by: STUDENT IN AN ORGANIZED HEALTH CARE EDUCATION/TRAINING PROGRAM

## 2024-03-28 RX ORDER — PRAVASTATIN SODIUM 20 MG
20 TABLET ORAL NIGHTLY
Status: DISCONTINUED | OUTPATIENT
Start: 2024-03-28 | End: 2024-03-29

## 2024-03-28 NOTE — CM/SW NOTE
No bed available at [facility] today. SW asked liaison if beds available at other [facility] locations if pt/spouse agreeable to this. Pt is medically cleared. DC order placed, avoidable day entered due to bed availability.     GUSTABO Fry

## 2024-03-28 NOTE — PROGRESS NOTES
[facility]  Neurosurgery Progress Note    Leanne Ching Patient Status:  Inpatient    1960 MRN [id number]   Location [facility] 7NE-A Attending Adelina Morris MD   Hosp Day # 8 PCP Shelby Frost MD     Chief Complaint:  Left frontal IPH     POD #6 s/p left frontal intraparenchymal hematoma evacuation     Subjective:  Continues to improve neurologically. In good spirits. Dispo to rehab pending bed availability.     Objective/Physical Exam:    Vital Signs:  Blood pressure 129/81, pulse 98, temperature 97.7 °F (36.5 °C), temperature source Oral, resp. rate 18, height 65\", weight 137 lb 12.6 oz (62.5 kg), SpO2 100%.    Neurologic:   A&Ox3 with improving expressive aphasia  PERRL, EOMi, right droop  LUE/LLE 5/5,   RUE 1/5 proximally and 3/5 distally  RLE 4-/5  Decreased sensation on the right  Incision c/d/i      Labs:  Recent Labs   Lab 24  0406 24  1056   RBC 3.95 4.33   HGB 11.5* 12.7   HCT 35.1 38.4   MCV 88.9 88.7   MCH 29.1 29.3   MCHC 32.8 33.1   RDW 14.4 13.5   WBC 10.0 5.3   .0 371.0       Recent Labs   Lab 24  0556 24  0633 24  0620 24  1057   * 116*  --  116*   BUN 15 13  --  19   CREATSERUM 0.57 0.51*  --  0.55   EGFRCR 101 104  --  102   CA 8.8 9.2  --  9.6    141  --  139   K 3.6  3.6 3.7 4.4 4.5    108  --  108   CO2 26.0 26.0  --  26.0       Assessment:  64 yo female with left frontal IPH s/p hematoma evacuation     Plan:  AED per neurology  PT/OT/SLP - would benefit from acute rehab, family prefers [facility], awaiting PMR eval  DVT prophylaxis SCDs, OK for chemoprophylaxis  Staples to be removed after 4/5, ok to remove in rehab  Follow up in 1 month in clinic with a head CT  Ok for transfer to rehab when able  Ok to shower

## 2024-03-28 NOTE — SLP NOTE
SPEECH/LANGUAGE/COGNITIVE EVALUATION - INPATIENT    Admission Date: 3/20/2024  Evaluation Date: 03/28/24    Reason for Referral: Stroke protocol    ASSESSMENT & PLAN   ASSESSMENT & IMPRESSION  Was transferred to [facility] from [facility] for Neurosurgical intervention.  On 3/22/24 underwent Left craniotomy for intraparenchymal hematoma evacuation. Contacted patient at the bedside for assessment of speech and language.   Patient's chief complaint of difficulty talking. Patient presented with  nonfluent expressive aphasia and difficulty of understanding abstract commands. She demonstrated halting speech and paraphasic errors with the ability to state necessary key words difficulty with expanding to phrase and sentence length. Suspect apraxia of speech component.  Patient very aware and attempts self correction during this visit.  See below objective measures for details.  Prior to patient's hospital admission patient independent in all ADLs.    Impairment in expressive and receptive language impacts the ability to complete premorbid tasks and ADL's.  Patient would benefit from ongoing skilled ST services while remaining in-house and upon discharge to address aforementioned deficits. ST is recommended including but not limited to aphasia and apraxia therapy, patient/family education, compensatory strategy practice , speech production strategies training to improve safety, independence and for return to previous level of communication. More in-depth assessment of communication skills is recommended at next level of care. Provided patient  with verbal education regarding results of this evaluation along with treatment plan as well as role of SLP in which understanding was verbalized and was in agreement with this information.       Patient administered the Bedside Western Aphasia Battery- Revised (WAB-R). The WAB-R is an individually administered test designed to evaluate a patient's language function  following a stroke, dementia, or there acquired neurological disorder.  The following scores were obtained:      Spontaneous Speech Content 7/10   Spontaneous Speech Fluency 5/10   Auditory Verbal Comprehension Yes/No questions 8/10   Auditory Verbal Comprehension Sequential Commands 10/10   Oral Expression Repetition 5/10   Oral Expression Object Naming 10/10      BEDSIDE APHASIA SCORE: 75 (a score of greater or equal to 93.8 is considered to be within normal limits on the comprehensive Western Aphasia Battery-Revised. This score can be used as a means of comparison on the Bedside Western Aphasia Battery-Revised).       BEDSIDE LANGUAGE SCORE: could not be obtained at this time due to reading and writing subtests deferred.  Level of assistance/compensatory: The patient benefitted from verbal repetition and decreasing rate of speech.    Assessment(s) Administered: WAB Bedside Score     WAB Bedside Score:  (75)             Deficits Identified: Auditory comprehension;Verbal expression;Written expression;Motor speech    Patient Experiencing Pain: No        GOALS  Goal #1 Patient will complete repetition of 3-4 syllable words with 80% accy across 3 sessions with reduced rate.   In Progress   Goal #2 Patient will complete sentences with 1-2 words with 75% accy.     In Progress   Goal #3 Patient will describe pictures with agent + action with 80% accy.   Not Addressed     Prior Living Situation: Home with spouse  Prior Level of Function: Independent      Imaging Results:   CT/CTA 3/20/24 CONCLUSION:    Large left frontal intraparenchymal hematoma again noted.  There is associated internal serpiginous enhancement, which may represent an abnormal tangle of vessels.  Finding raises the possibility of an underlying vascular malformation.  Neuro surgical   consultation has already been placed at this time.  Further assessment with diagnostic interventional angiography and/or contrast enhanced MRI brain would be helpful.     Local mass effect from left frontal intraparenchymal hematoma as well as left to right 5 millimeter midline shift again noted.        Patient/Family Goals: To speak clearly and express needs and ideas.     Interdisciplinary Communication: Disussed with other staff    Patient and  family has been informed and has taken part in this evaluation and plan of treatment and have been advised and agree on the findings and goals.      FOLLOW UP  Treatment Plan/Recommendations: Cognitive communication therapy  Number of Visits to Meet Established Goals: 4  Follow Up Needed (Documentation Required): Yes  SLP Follow-up Date: 03/29/24    Thank you for your referral.  If you have any questions please contact TERE Rodrigez

## 2024-03-28 NOTE — PROGRESS NOTES
[facility]   part of [facility]     Hospitalist Progress Note     Leanne Ching Patient Status:  Inpatient    1960 MRN [id number]   Location [facility] 7NE-A Attending Rene Dueñas, DO   Hosp Day # 8 PCP Shelby Frost MD     Chief Complaint: Slurred speech and RUE weakness     Subjective:     Patient resting in chair and feels well. She worked with PT earlier this morning     Objective:    Review of Systems:   A comprehensive review of systems was completed; pertinent positive and negatives stated in subjective.    Vital signs:  Temp:  [97.5 °F (36.4 °C)-99.3 °F (37.4 °C)] 98.4 °F (36.9 °C)  Pulse:  [] 74  Resp:  [14-18] 18  BP: (107-153)/(63-94) 119/85  SpO2:  [97 %-100 %] 100 %    Physical Exam:    General: No acute distress  Respiratory: No wheezes, no rhonchi  Cardiovascular: S1, S2, regular rate and rhythm  Abdomen: Soft, Non-tender, non-distended, positive bowel sounds  Neuro: Right facial droop, dysarthria, right-sided weakness  Extremities: No edema    Diagnostic Data:    Labs:  Recent Labs   Lab 24  0406 24  1056   WBC 10.0 5.3   HGB 11.5* 12.7   MCV 88.9 88.7   .0 371.0       Recent Labs   Lab 24  0556 24  0633 24  0620 24  1057   * 116*  --  116*   BUN 15 13  --  19   CREATSERUM 0.57 0.51*  --  0.55   CA 8.8 9.2  --  9.6    141  --  139   K 3.6  3.6 3.7 4.4 4.5    108  --  108   CO2 26.0 26.0  --  26.0       Estimated Creatinine Clearance: 91.8 mL/min (based on SCr of 0.55 mg/dL).    No results for input(s): \"TROP\", \"TROPHS\", \"CK\" in the last 168 hours.    No results for input(s): \"PTP\", \"INR\" in the last 168 hours.               Microbiology    No results found for this visit on 24.      Imaging: Reviewed in Epic.    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  -ICH protocol  -Keppra   -Staples to be removed 4/5   -Neurosurgery following     #Dysphagia, resolved   -Tolerating oral diet     #Pneumocephalus   -Noted on imaging   -O2 provided    #Vasogenic edema   -Mild     #Prediabetes   -Hyperglycemia protocol     #Hypertension  -Losartan     DC planning; awaiting placement to acute rehab     Rene Dueñas DO    Supplementary Documentation:     Quality:  DVT Mechanical Prophylaxis:   SCDs,    DVT Pharmacologic Prophylaxis   Medication    enoxaparin (Lovenox) 40 MG/0.4ML SUBQ injection 40 mg                Code Status: Full Code  Miles: External urinary catheter in place  Miles Duration (in days):   Central line:    LISA: 3/28/2024    Discharge is dependent on: Clinical improvement   At this point Ms. Ching is expected to be discharge to: Home     The 21st Century Cures Act makes medical notes like these available to patients in the interest of transparency. Please be advised this is a medical document. Medical documents are intended to carry relevant information, facts as evident, and the clinical opinion of the practitioner. The medical note is intended as peer to peer communication and may appear blunt or direct. It is written in medical language and may contain abbreviations or verbiage that are unfamiliar.

## 2024-03-28 NOTE — PHYSICAL THERAPY NOTE
PHYSICAL THERAPY TREATMENT NOTE - INPATIENT    Room Number: 7615/7615-A     Session: 2    Number of Visits to Meet Established Goals: 3    Presenting Problem: Nontraumatic cortical hemorrhage of L cerebral hemisphere  Co-Morbidities : Hypokalemia, HTN, Internal hemorrhoids    ASSESSMENT   Patient demonstrates fair progress this session, goals  remain in progress. Patient able to begin gait training with assist of one today. Improved sitting and standing balance noted. Patient is oriented to self, family, surrounding and situation. Needed to be oriented to date. Patient actively participates well.     Patient continues to function below baseline with bed mobility, transfers, gait, stair negotiation, and maintaining seated position.  Contributing factors to remaining limitations include decreased functional strength, decreased endurance/aerobic capacity, impaired   balance, impaired coordination, impaired motor planning, and medical status.  Follows instructions well, needs longer respond time and aphasia noted. Next session anticipate patient to progress transfers and gait.  Physical Therapy will continue to follow patient for duration of hospitalization.    Patient continues to benefit from continued skilled PT services: to facilitate return to prior level of function as patient demonstrates high motivation with excellent tolerance to an intensive therapy program .    PLAN  PT Treatment Plan: Bed mobility;Body mechanics;Endurance;Gait training;Strengthening;Stair training;Transfer training;Balance training  Rehab Potential : Good  Frequency (Obs): 3-5x/week    CURRENT GOALS   Goal #1 Patient is able to demonstrate supine - sit EOB @ level: minimum assistance     Goal #2 Patient is able to demonstrate transfers EOB to/from [facility] at assistance level: minimum assistance     Goal #3 Patient is able to ambulate 50 feet with assist device: walker - rolling at assistance level: minimum assistance     Goal #4    Goal #5     Goal #6    Goal Comments: Goals established on 3/25/2024    3/28/2024 all goals ongoing .    SUBJECTIVE  \" I just need some time to think, I will get it.\"    OBJECTIVE  Precautions: None    WEIGHT BEARING RESTRICTION  Weight Bearing Restriction: None                PAIN ASSESSMENT   Ratin  Location: Pt reported no pain       BALANCE                                                                                                                       Static Sitting: Fair  Dynamic Sitting: Fair -           Static Standing: Poor +  Dynamic Standing: Poor    ACTIVITY TOLERANCE      SPO2 95% on room air  / 89      AM-PAC '6-Clicks' INPATIENT SHORT FORM - BASIC MOBILITY  How much difficulty does the patient currently have...  Patient Difficulty: Turning over in bed (including adjusting bedclothes, sheets and blankets)?: A Little   Patient Difficulty: Sitting down on and standing up from a chair with arms (e.g., wheelchair, bedside commode, etc.): A Lot   Patient Difficulty: Moving from lying on back to sitting on the side of the bed?: A Lot   How much help from another person does the patient currently need...   Help from Another: Moving to and from a bed to a chair (including a wheelchair)?: A Lot   Help from Another: Need to walk in hospital room?: A Lot   Help from Another: Climbing 3-5 steps with a railing?: Total       AM-PAC Score:  Raw Score: 12   Approx Degree of Impairment: 68.66%   Standardized Score (AM-PAC Scale): 35.33   CMS Modifier (G-Code): CL    FUNCTIONAL ABILITY STATUS  Gait Assessment   Functional Mobility/Gait Assessment  Gait Assistance: Maximum assistance  Distance (ft): 8  Assistive Device:  (Basilio walker)  Pattern: R Foot flat;R Foot drag (R lean, dec quad control and buckling noted)    Skilled Therapy Provided    Bed Mobility:  Rolling: cues to flex knees, able to roll to R side with sup.    Supine<>Sit: cues for proper positioning of RUE, LUE support on bed rail and cues for engagement  of core/hips, patient able to initiate activity, mod assist to complete task safely. Longer time needed.   Sit<>Supine: NT     Transfer Mobility:  Sit<>Stand: Mod assist of one, cues for hand placement, R quad stability. Repeated three times.   Stand<>Sit: cues for hand placement, mod assist for eccentric control.    Gait: patient amb 8' with Basilio walker and assist of one. patient able to advance RLE, ataxic pattern due to dec motor control, R knee quad control with manual support and mod assist for balance and safety .   R knee soft ace wrap assisted with df.   Therapist's Comments: Pre gait training provided. patient worked in sit to stand training. Facilitated weight shift in standing position ant<> post and side to side. Patient needed L ue support on basilio walker, step forward and back in place with R and L.  Cues provided and R knee supported to prevent buckling.   Seated balance training performed - wt shift ant<>post, lateral and head turns to improve R side awareness.  RUE wb in lateral lean and facilitated push off. R shoulder and joint protected with manual assist.   Jorge ue and le ROM performed.  R LE unilateral bridge x 5  Bilateral bridge x 5  Hooklying abd with cues for motor control x 10  Heel slides x 10 with assist for motor control  DF and PF  Resisted DF and hip ext.         Patient End of Session: Up in chair;Needs met;Call light within reach;RN aware of session/findings;All patient questions and concerns addressed;Family present    PT Session Time: 43 minutes  Gait Training: 10 minutes  Therapeutic Activity: 15 minutes  Therapeutic Exercise: 5 minutes   Neuromuscular Re-education: 15 minutes

## 2024-03-28 NOTE — SLP NOTE
SPEECH DAILY NOTE - INPATIENT    ASSESSMENT & PLAN   ASSESSMENT  Pt seen for dysphagia tx to assess tolerance with recommended diet, ensure proper utilization of aspiration precautions and provide pt/family education.  Patient seen with recommended diet of soft/easy to chew solids and thin liquids without any overt clinical s/s of aspiration. Patient independently utilizes aspiration precautions. Patient with drooling on the right side of mouth requiring cues to place food on the left side of mouth. She presents with safe and efficient po intake of recommended diet. Recommend ongoing therapy to ensure safety with diet and educate pt on compensatory strategies/swallow precautions.      Diet Recommendations - Solids: Soft/ Easy to chew  Diet Recommendations - Liquids: Thin Liquids    Compensatory Strategies Recommended: No straws;Slow rate;Alternate consistencies;Small bites and sips;Extra sauce/gravy  Aspiration Precautions: Upright position;Slow rate;Small bites and sips;No straw  Medication Administration Recommendations: Crushed in puree;Whole in puree    Patient Experiencing Pain: No                Treatment Plan  Treatment Plan/Recommendations: Communication evaluation    Interdisciplinary Communication: Plan posted at bedside            GOALS  Goal #1 The patient will tolerate soft & bite sized consistency and small, single sips/no straws thin liquids without overt signs or symptoms of aspiration with 95 % accuracy over 3 session(s). In progress   Goal #2 The patient/family/caregiver will demonstrate understanding and implementation of aspiration precautions and swallow strategies independently over 3 session(s).    In progress   Goal #3 The patient will utilize compensatory strategies as outlined by  BSSE (clinical evaluation) including Slow rate, Small bites, Small sips, Alternate liquids/solids, No straws, Upright 90 degrees 30 mins after meal, Feed patient, Supervision with meals with mod assistance 100 %  of the time across 2 sessions. In progress   Goal #4 Communication evaluation     Not formally addressed         FOLLOW UP  Follow Up Needed (Documentation Required): Yes  SLP Follow-up Date: 03/28/24  Number of Visits to Meet Established Goals: 4    Session: 2 of 4    If you have any questions, please contact Sarah SMITH, SLP

## 2024-03-28 NOTE — PLAN OF CARE
Assumed care 0730  A+Ox4, RA, SR on tele, no c/o pain  VSS  PT/OT consult   -stand-pivot to chair, 2max  Encouraged self-feeding/oral care/repositioning   -with assistance as needed  Daily cares   -see flowsheets  Awaiting bed at [facility]  Call light in reach, needs addressed

## 2024-03-28 NOTE — PLAN OF CARE
PT A/O, EXPRESSIVE APHASIA, RT DROOP, RT EXTREMITIES WEAKNESS, INCISION WITH STAPLES TO HEAD D/I, BP WITHIN PARAMETERS, VOIDING PER PURE WICK.    Problem: NEUROLOGICAL - ADULT  Goal: Achieves stable or improved neurological status  Description: INTERVENTIONS  - Assess for and report changes in neurological status  - Initiate measures to prevent increased intracranial pressure  - Maintain blood pressure and fluid volume within ordered parameters to optimize cerebral perfusion and minimize risk of hemorrhage  - Monitor temperature, glucose, and sodium. Initiate appropriate interventions as ordered  Outcome: Progressing

## 2024-03-29 ENCOUNTER — PATIENT OUTREACH (OUTPATIENT)
Dept: CASE MANAGEMENT | Age: 64
End: 2024-03-29

## 2024-03-29 VITALS
BODY MASS INDEX: 22.96 KG/M2 | RESPIRATION RATE: 18 BRPM | WEIGHT: 137.81 LBS | HEIGHT: 65 IN | HEART RATE: 83 BPM | OXYGEN SATURATION: 96 % | DIASTOLIC BLOOD PRESSURE: 81 MMHG | SYSTOLIC BLOOD PRESSURE: 117 MMHG | TEMPERATURE: 98 F

## 2024-03-29 LAB
GLUCOSE BLD-MCNC: 115 MG/DL (ref 70–99)
GLUCOSE BLD-MCNC: 94 MG/DL (ref 70–99)

## 2024-03-29 PROCEDURE — 99239 HOSP IP/OBS DSCHRG MGMT >30: CPT | Performed by: STUDENT IN AN ORGANIZED HEALTH CARE EDUCATION/TRAINING PROGRAM

## 2024-03-29 RX ORDER — LEVETIRACETAM 500 MG/1
1500 TABLET ORAL 2 TIMES DAILY
Status: DISCONTINUED | OUTPATIENT
Start: 2024-03-29 | End: 2024-03-29

## 2024-03-29 NOTE — CM/SW NOTE
03/29/24 1110   Discharge disposition   Expected discharge disposition [facility]   Post Acute Care Provider   ([facility])   Discharge transportation [facility]     Pt to DC via ambulance at 2pm, PCS completed. Pt, spouse, RN aware.     Room number will be available upon arrival. Pt to dc to the 24th Floor at [facility] ([facility] [place] [address]). RN to call [phone] for report.     GUSTABO Fry

## 2024-03-29 NOTE — PLAN OF CARE
Assumed care at 0730  Patient alert, oriented x4  Q4 neuro checks, see flowsheets   VSS, RA throughout shift, NSR on tele  Voiding via purewick   Denies pain   L head staples ZOILA, C/D/I  PT eval done, up max assist w/ 4 point cane  Call light within reach    Plan to await bed at rehab  Patient and family updated on plan of care

## 2024-03-29 NOTE — PROGRESS NOTES
Calling pt , Hosea to schedule Neuro/Stroke follow    Per Edw RN Stroke Navigator  Date of TIA/Stroke: 3/20/2024  Type of Stroke: Hemorrhagic  Patient to follow-up: 3 months    Dr Carl Portillo  Neurology  [address] [place] [id number]  [phone]  Apt made:  Tue 06/25 @11:30am  Pt  asked that apt information on MyChart, no need to call back  Closing encounter

## 2024-03-29 NOTE — PROGRESS NOTES
[facility]  Neurosurgery Progress Note    Leanne Ching Patient Status:  Inpatient    1960 MRN [id number]   Location [facility] 7NE-A Attending Rene Dueñas, DO   Hosp Day # 9 PCP Shelby Frost MD     Chief Complaint:  Left frontal IPH    POD #7 s/p left frontal IPH evacuation    Subjective:  Awaiting rehab placement    Objective/Physical Exam:    Vital Signs:  Blood pressure 120/72, pulse 95, temperature 98.3 °F (36.8 °C), temperature source Oral, resp. rate 18, height 65\", weight 137 lb 12.6 oz (62.5 kg), SpO2 97%.  Respiratory:  nonlabored  CV:  well perfused  General: NAD, Expressive aphasia  Neurologic:   A&Ox3  PERRL, EOMi, right droop  LUE/LLE 5/5,   RUE 1/5 proximally and 3/5 distally  RLE 4-/5  Decreased sensation on the right  Skin: Staples ZOILA    Labs:  Recent Labs   Lab 24  1056   RBC 4.33   HGB 12.7   HCT 38.4   MCV 88.7   MCH 29.3   MCHC 33.1   RDW 13.5   WBC 5.3   .0       Recent Labs   Lab 24  0556 24  0633 24  0620 24  1057   * 116*  --  116*   BUN 15 13  --  19   CREATSERUM 0.57 0.51*  --  0.55   EGFRCR 101 104  --  102   CA 8.8 9.2  --  9.6    141  --  139   K 3.6  3.6 3.7 4.4 4.5    108  --  108   CO2 26.0 26.0  --  26.0       Imaging:  XR VIDEO SWALLOW (CPT=74230)    Result Date: 3/25/2024  PROCEDURE:  XR VIDEO SWALLOW (CPT=74230)  TECHNIQUE:  Video fluoroscopic swallowing study was performed in cooperation with the speech pathologist.  Barium of varying consistencies was administered orally with patient in the lateral projection.  COMPARISON:  None.  INDICATIONS:  assess swallow physiology  PATIENT STATED HISTORY: (As transcribed by Technologist)  Patient offered no additional history at this time.   CINE CAPTURES:  5 FLUORSCOPY TIME:  2 minutes 20 seconds RADIATION DOSE (AIR KERMA PRODUCT):  112.3 uGy/m2  FINDINGS:  The patient was challenged with thin, nectar, pureed and cracker consistencies by the speech  pathologist.  One episode of penetration was observed with thin liquids.  No aspiration.   PLEASE SEE SPEECH PATHOLOGIST REPORT FOR FULL ASSESSMENT OF SWALLOWING MECHANISM.   LOCATION:  [facility]    Dictated by (CST): Hayden Pierce MD on 3/25/2024 at 9:55 AM     Finalized by (CST): Hayden Pierce MD on 3/25/2024 at 9:55 AM       XR CHEST AP PORTABLE  (CPT=71045)    Result Date: 3/25/2024  CONCLUSION:  Tip of Dobbhoff projects in the region of the gastric body, retracted from previous exam performed 3/23/2024.  Clinical service notified of these findings with preliminary radiology report from [facility] services.    LOCATION:  [facility]      Dictated by (CST): Kanchan Harmon MD on 3/25/2024 at 7:51 AM     Finalized by (CST): Kanchan Harmon MD on 3/25/2024 at 7:53 AM       XR CHEST AP PORTABLE  (CPT=71045)    Result Date: 3/23/2024  CONCLUSION:  Dobbhoff tube has been placed, and the radiograph demonstrates the tube with the metal tip within the distal stomach.    LOCATION:  [facility]      Dictated by (CST): Brian Valerio MD on 3/23/2024 at 4:34 PM     Finalized by (CST): Brian Valerio MD on 3/23/2024 at 4:34 PM       MRV HEAD (W+WO)(MWF=81008)    Result Date: 3/23/2024  CONCLUSION:  No venous thrombosis identified.   LOCATION:  [facility]   Dictated by (CST): Brian Valerio MD on 3/23/2024 at 3:15 PM     Finalized by (CST): Brian Valerio MD on 3/23/2024 at 3:17 PM       MRI BRAIN(W+WO)/MRA BRAIN (CPT=70553/21903)    Result Date: 3/23/2024  CONCLUSION:  MRA shows no specific vascular lesion, occlusion or high-grade stenosis.  Complex imaging appearance of the left posterior frontal lobe consistent with subacute postoperative hematoma, including vasogenic edema, with mild midline shift, but no herniation or hydrocephalus.   LOCATION:  [facility]   Dictated by (CST): Brian Valerio MD on 3/23/2024 at 3:09 PM     Finalized by (CST): Brian Valerio MD on 3/23/2024 at 3:15 PM       CT BRAIN OR HEAD ([id number])    Result Date: 3/22/2024  CONCLUSION:    1. Postoperative changes from left frontoparietal craniotomy with surgical clips along the left scalp are again noted.  There is partial redistribution of the previously visualized postprocedural pneumocephalus most pronounced along the anterior cerebral  convexities bilaterally.  Mixed attenuation extra-axial collection containing fluid, blood, and air along the left cerebral convexity measuring up to 13 mm in thickness, previously 16 mm.  Decreased left-to-right midline shift of approximately 3 mm, previously 6 mm.  2. Redemonstration of changes related to partial evacuation of parenchymal hematoma in the left frontal parietal region the largest residual component again measures approximately 1.4 x 0.5 x 1.6 cm.  A 2nd stable small residual component measures approximately 0.8 x 0.8 x 1.1 cm.  Small amount of edema and air attenuation noted along the operative bed.  Clinical correlation and continued follow-up is suggested.  LOCATION:  [facility]   Dictated by (CST): Husam Martin MD on 3/22/2024 at 6:22 AM     Finalized by (CST): Husam Martin MD on 3/22/2024 at 6:28 AM       XR CHEST AP PORTABLE  (CPT=71045)    Result Date: 3/22/2024  CONCLUSION:  Endotracheal tube tip below thoracic inlet with the tip positioned 2.6 cm from the jakub.  OG tube tip in the stomach  A preliminary report was provided by the [facility] teleradiology service.  LOCATION:  [facility]      Dictated by (CST): Husam Martin MD on 3/22/2024 at 5:47 AM     Finalized by (CST): Husam Martin MD on 3/22/2024 at 5:48 AM       CT BRAIN OR HEAD ([id number])    Result Date: 3/22/2024  CONCLUSION:   1. Interval changes of left frontoparietal craniotomy with surgical clips along the left scalp.  Moderate amount of postprocedural pneumocephalus most pronounced along left cerebral convexity.  Mixed attenuation extra-axial collection containing fluid and air along the left cerebral convexity measuring up to 16 mm in thickness.  There is left-to-right midline  shift of approximately 6 mm.  2. Interval partial evacuation of parenchymal hematoma in the left frontal parietal region the largest residual component measures approximately 1.4 x 0.5 x 1.6 cm, previously 4.0 x 3.2 x 5.0 cm.  A 2nd small residual component measures approximately 0.8  x 0.8 x 1.1 cm.  Small amount of edema and air attenuation noted along the operative bed.  Clinical correlation and continued follow-up is suggested.  A preliminary report was provided by the [facility]eradiology service.  LOCATION:  [id number]   Dictated by (CST): Husam Martin MD on 3/22/2024 at 4:59 AM     Finalized by (CST): Husam Martin MD on 3/22/2024 at 5:03 AM       US INTRAOPERATIVE GUIDANCE (CPT=76998)    Result Date: 3/22/2024  CONCLUSION:  Please see the procedure/operative report for further details.    LOCATION:  [facility]    Dictated by (CST): Stromberg, LeRoy, MD on 3/22/2024 at 0:06 AM     Finalized by (CST): Stromberg, LeRoy, MD on 3/22/2024 at 0:07 AM       CT BRAIN OR HEAD ([id number])    Result Date: 3/21/2024  CONCLUSION:  Slight interval increase in size of left frontal region parenchymal hematoma with measurements given above.  There is mass effect related to this but no midline shift.  Above findings were reported to IONA Abdi on March 21, 2024 at 3:32 p.m..    LOCATION:  [facility]   Dictated by (CST): Jose Alberto Min MD on 3/21/2024 at 3:29 PM     Finalized by (CST): Jose Alberto Min MD on 3/21/2024 at 3:32 PM       CT BRAIN OR HEAD ([id number])    Result Date: 3/21/2024  CONCLUSION:  4.5 x 3.7 x 3.4 cm acute intraparenchymal hematoma arising from the posterior left frontal lobe with localized mass effect and a small amount of surrounding vasogenic edema and mild midline shift to the right of 3 mm.  No significant change.      LOCATION:  [facility]   Dictated by (CST): Charis Barnes MD on 3/21/2024 at 6:25 AM     Finalized by (CST): Charis Barnes MD on 3/21/2024 at 6:36 AM       CT STROKE CTA BRAIN/CTA NECK (W  IV)(CPT=70496/33465)    Result Date: 3/20/2024  CONCLUSION:   Large left frontal intraparenchymal hematoma again noted.  There is associated internal serpiginous enhancement, which may represent an abnormal tangle of vessels.  Finding raises the possibility of an underlying vascular malformation.  Neuro surgical consultation has already been placed at this time.  Further assessment with diagnostic interventional angiography and/or contrast enhanced MRI brain would be helpful.  Local mass effect from left frontal intraparenchymal hematoma as well as left to right 5 millimeter midline shift again noted.  No flow-limiting stenosis within the major arteries of the neck.  Approximately 36% narrowing of the left carotid bulb by atherosclerotic plaque.  No significant narrowing of the right carotid bulb.  No high-grade flow-limiting stenosis within the major arteries of the [id number] intracranially.     Dictated by (CST): Giselle Alexander MD on 3/20/2024 at 8:05 AM     Finalized by (CST): Giselle Alexander MD on 3/20/2024 at 8:22 AM          CT BRAIN OR HEAD ([id number])    Result Date: 3/20/2024  CONCLUSION:  1. Large acute 4.5 x 3.5 cm intraparenchymal hematoma centered at the periphery of the left frontal lobe.  Although the hematoma largely appears similar in size and morphology since examination from approximately 6.5 hours prior, there is a small curvilinear band of acute hemorrhagic components along the posterior margin of the hematoma, which are new since prior.  Mild surrounding vasogenic edema appears unchanged.  Stable left cerebral hemispheric mass effect with mild 2-3 mm left-to-right midline shift.  Continued close follow-up to resolution is advised. 2. Intracranial atherosclerosis.   Dictated by (CST): Finesse Martinez MD on 3/20/2024 at 7:54 AM     Finalized by (CST): Finesse Martinez MD on 3/20/2024 at 7:59 AM          XR CHEST AP PORTABLE  (CPT=71045)    Result Date: 3/20/2024  CONCLUSION:   No  radiographic evidence of acute cardiopulmonary process.  A preliminary report was issued by the [facility] teleradiology service. There are no clinically actionable discrepancies.    Dictated by (CST): Giselle Alexander MD on 3/20/2024 at 7:46 AM     Finalized by (CST): Giselle Alexander MD on 3/20/2024 at 7:46 AM          CT STROKE BRAIN (NO IV)(CPT=70450)    Result Date: 3/20/2024  CONCLUSION:   Large 4.8 centimeter left frontal intraparenchymal hematoma with moderate local mass effect, as well as 3 millimeters left-to-right midline shift.  Neurosurgical consultation is recommended.  Follow-up CT brain is recommended.  Differential considerations include underlying vascular etiologies such as AVM, a hemorrhagic mass, or other etiologies.   This report was called immediately at 105 Eastern time on 03/20/2024 hours to Dr. Angeles by Dr Bryant Reynoso   A preliminary report was issued by the [facility] teleradiology service. There are no clinically actionable discrepancies.  Dictated by (CST): Giselle Alexander MD on 3/20/2024 at 7:16 AM     Finalized by (CST): Gsielle Alexander MD on 3/20/2024 at 7:23 AM              Assessment:  62 yo female with left frontal IPH s/p hematoma evacuation     Plan:  [facility] per neurology  PT/OT/SLP - would benefit from acute rehab, family prefers [facility], awaiting PMR eval  DVT prophylaxis SCDs, OK for chemoprophylaxis  Staples to be removed after 4/5, ok to remove in rehab  Follow up in 1 month in clinic with a head CT  Ok for transfer to rehab when able  Ok to shower      Is this a shared or split note between Advanced Practice Provider and Physician? Yes       ALISON Lozada  [facility]  3/29/2024, 10:11 AM  [facility] [id number]

## 2024-03-29 NOTE — PLAN OF CARE
Assumed care at 0730  Patient alert, oriented x4  Q4 neuro checks, see flowsheets   VSS, RA throughout shift, NSR on tele  Voiding via purewick   (+) BM this shift  Denies pain   L head staples ZOILA, C/D/I  Up max assist w/ 4 point cane  Call light within reach    AVS reviewed with patient and family  All questions answered  Belongings sent with patient and family    NURSING DISCHARGE NOTE    Discharged Rehab facility via Ambulance.  Accompanied by Support staff  Belongings Taken by patient/family.

## 2024-03-29 NOTE — DISCHARGE SUMMARY
[facility] HOSPITALIST  DISCHARGE SUMMARY     Leanne Ching Patient Status:  Inpatient    1960 MRN [id number]   Location [facility] 7NE-A Attending Rene Dueñas, DO   Hosp Day # 9 PCP Shelby Frost MD     Date of Admission: 3/20/2024  Date of Discharge:   3/29/2024    Discharge Disposition: [facility] [place]    Discharge Diagnosis:  #Left frontal intraparenchymal hematoma  -S/p craniotomy 3/21   #Dysphagia, resolved  #Pneumocephalus  #Vasogenic edema  #Prediabetes  #Hypertension      History of Present Illness: Leanne Ching is a 63 year old female with history of hypertension, hyperlipidemia presented to [facility] last night with sudden onset of slurred speech and right arm weakness.  Patient was starting to have some difficulty with speaking around 1030 last night.  It was after the slurred speech that she started having some right arm weakness.  No headache and, vision changes.  Neurosurgery here [facility] reviewed the case and recommended transfer to [facility] for diagnostic cerebral angiogram to be done tomorrow morning.  So far there is been no progression or regression of patient's symptoms.  No fevers, chills, vomiting, diarrhea.  No urinary or fecal incontinence.         Brief Synopsis: Patient was admitted to neuro ICU.  Neurosurgery was consulted.  Patient underwent left craniotomy for hematoma evacuation.  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  EEG showed no abnormalities.  Patient was started on Keppra.  Staples will be removed on .  PT OT recommended acute rehab.  Patient was discharged with outpatient follow-up.    Lace+ Score: 68  59-90 High Risk  29-58 Medium Risk  0-28   Low Risk       TCM Follow-Up Recommendation:  LACE > 58: High Risk of readmission after discharge from the hospital.      Procedures during hospitalization:   Left craniotomy for hematoma evacuation    Incidental or significant findings and recommendations (brief  descriptions):  See brief synopsis above    Lab/Test results pending at Discharge:   None    Consultants:  Neurosurgery  Neurology    Discharge Medication List:     Discharge Medications        START taking these medications        Instructions Prescription details   levETIRAcetam 500 MG Tabs  Commonly known as: Keppra      Take 3 tablets (1,500 mg total) by mouth 2 (two) times daily.   Quantity: 270 tablet  Refills: 0     losartan 25 MG Tabs  Commonly known as: Cozaar      Take 1 tablet (25 mg total) by mouth daily.   Stop taking on: April 27, 2024  Quantity: 30 tablet  Refills: 0            CONTINUE taking these medications        Instructions Prescription details   acetaminophen 325 MG Tabs  Commonly known as: Tylenol      Take 2 tablets (650 mg total) by mouth every 4 (four) hours as needed.   Refills: 0     amLODIPine 5 MG Tabs  Commonly known as: Norvasc      Take 1 tablet (5 mg total) by mouth daily.   Refills: 0     simvastatin 10 MG Tabs  Commonly known as: Zocor      Take 1 tablet (10 mg total) by mouth nightly.   Refills: 0            STOP taking these medications      bisacodyl 10 MG Supp  Commonly known as: Dulcolax        niCARdipine in sodium chloride 0.86% 20 mg/200mL Soln  Commonly known as: carDENE        ondansetron 4 MG/2ML Soln  Commonly known as: Zofran        Polyethylene Glycol 3350 17 g Pack  Commonly known as: MIRALAX        Sennosides 17.2 MG Tabs        sodium chloride 0.9%        sodium chloride 0.9% PF 0.9% SOLN 10 mL with pantoprazole 40 MG SOLR 40 mg                  Where to Get Your Medications        [id number] - [place], [place] - [address] AT [facility], [phone], [phone]  [address], [facility] [address]      Phone: [phone]   levETIRAcetam 500 MG Tabs  losartan 25 MG Tabs         [place]    Follow-up appointment:   Carmina Banks, APRN  [address]  [facility] [address]  [phone]    Follow up on 2024  at 10:15  Please get head CT before your visit  If you are still in rehab, please call to coordinate    Carl Portillo MD  [address] [place] [address]  [phone]    Go on 2024  @11:30am for your Neuro/Stroke follow up appointment    “SRAL to call Dr Carl Portillo to cancel appointment, if patient is not discharged by this date.”    Shelby Frost MD  [address]  [address]  [facility] [address]  [phone]    Call in 1 week(s)      Luis Waller MD  [address]  [place] [address]  [phone]    Follow up  As needed    Appointments for Next 30 Days 3/29/2024 - 2024        Date Arrival Time Visit Type Length Department Provider     2024 11:45 AM  EXAM - ESTABLISHED [2844] 30 min [facility], [facility], APRN    Patient Instructions:         Location Instructions:     Masks are optional for all patients and visitors, unless otherwise indicated.                      Vital signs:  Temp:  [97.7 °F (36.5 °C)-98.6 °F (37 °C)] 98.3 °F (36.8 °C)  Pulse:  [] 83  Resp:  [18-19] 18  BP: (111-129)/(67-81) 117/81  SpO2:  [96 %-100 %] 96 %    Physical Exam:    General: No acute distress   Lungs: clear to auscultation  Cardiovascular: S1, S2  Abdomen: Soft      -----------------------------------------------------------------------------------------------  PATIENT DISCHARGE INSTRUCTIONS: See electronic chart    Rene Dueñas, DO    Total time spent on discharge plannin minutes     The  Century Cures Act makes medical notes like these available to patients in the interest of transparency. Please be advised this is a medical document. Medical documents are intended to carry relevant information, facts as evident, and the clinical opinion of the practitioner. The medical note is intended as peer to peer communication and may  appear blunt or direct. It is written in medical language and may contain abbreviations or verbiage that are unfamiliar.

## 2024-03-29 NOTE — CM/SW NOTE
MAR sent to [facility] referral-- awaiting response on when bed would become available. Asked on availability with other locations as well with [facility]. As an alternative, inquired if [facility] has beds available as well, which they do have bed availability.     Will await for [facility] response after their bed meetings this AM and will f/u with spouse.     GUSTABO Fry

## 2024-03-29 NOTE — PROGRESS NOTES
[facility]   part of [facility]     Hospitalist Progress Note     Leanne Ching Patient Status:  Inpatient    1960 MRN [id number]   Location [facility] 7NE-A Attending Rene Dueñas,    Hosp Day # 9 PCP Shelby Frost MD     Chief Complaint: Slurred speech and RUE weakness     Subjective:     Patient resting in bed with no complaints     Objective:    Review of Systems:   A comprehensive review of systems was completed; pertinent positive and negatives stated in subjective.    Vital signs:  Temp:  [97.5 °F (36.4 °C)-98.6 °F (37 °C)] 98.3 °F (36.8 °C)  Pulse:  [] 95  Resp:  [18-19] 18  BP: (111-135)/(67-84) 120/72  SpO2:  [96 %-100 %] 97 %    Physical Exam:    General: No acute distress  Respiratory: No wheezes, no rhonchi  Cardiovascular: S1, S2, regular rate and rhythm  Abdomen: Soft, Non-tender, non-distended, positive bowel sounds  Neuro: Right facial droop, dysarthria, right-sided weakness  Extremities: No edema    Diagnostic Data:    Labs:  Recent Labs   Lab 24  1056   WBC 5.3   HGB 12.7   MCV 88.7   .0       Recent Labs   Lab 24  0556 24  0633 24  0620 24  1057   * 116*  --  116*   BUN 15 13  --  19   CREATSERUM 0.57 0.51*  --  0.55   CA 8.8 9.2  --  9.6    141  --  139   K 3.6  3.6 3.7 4.4 4.5    108  --  108   CO2 26.0 26.0  --  26.0       Estimated Creatinine Clearance: 91.8 mL/min (based on SCr of 0.55 mg/dL).    No results for input(s): \"TROP\", \"TROPHS\", \"CK\" in the last 168 hours.    No results for input(s): \"PTP\", \"INR\" in the last 168 hours.               Microbiology    No results found for this visit on 24.      Imaging: Reviewed in Epic.    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  -ICH protocol  -Keppra   -Staples to be removed 4/5   -Neurosurgery following     #Dysphagia, resolved   -Tolerating oral diet     #Pneumocephalus   -Noted on imaging   -O2 provided    #Vasogenic edema   -Mild     #Prediabetes   -Hyperglycemia protocol     #Hypertension  -Losartan     DC planning; awaiting placement to acute rehab     Rene Dueñas DO    Supplementary Documentation:     Quality:  DVT Mechanical Prophylaxis:   SCDs,    DVT Pharmacologic Prophylaxis   Medication    enoxaparin (Lovenox) 40 MG/0.4ML SUBQ injection 40 mg                Code Status: Full Code  Miles: External urinary catheter in place  Miles Duration (in days):   Central line:    LISA: 3/28/2024    Discharge is dependent on: Clinical improvement   At this point Ms. Ching is expected to be discharge to: Home     The 21st Century Cures Act makes medical notes like these available to patients in the interest of transparency. Please be advised this is a medical document. Medical documents are intended to carry relevant information, facts as evident, and the clinical opinion of the practitioner. The medical note is intended as peer to peer communication and may appear blunt or direct. It is written in medical language and may contain abbreviations or verbiage that are unfamiliar.

## 2024-03-29 NOTE — PLAN OF CARE
Assumed care.  No new issues overnight.  Slept well.  Keppra given IV as ordered.  Awaiting bed for discharge.

## 2024-04-01 ENCOUNTER — TELEPHONE (OUTPATIENT)
Dept: MEDSURG UNIT | Facility: HOSPITAL | Age: 64
End: 2024-04-01

## 2024-04-01 NOTE — PROGRESS NOTES
Attempted to reach patient to complete 7-day stroke follow-up call. No answer, left voicemail with instructions to call the non-emergent stroke line at [phone] with any questions or concerns.

## 2024-04-02 ENCOUNTER — TELEPHONE (OUTPATIENT)
Dept: SURGERY | Facility: CLINIC | Age: 64
End: 2024-04-02

## 2024-04-02 NOTE — TELEPHONE ENCOUNTER
Patients  is requesting to speak with clinical staff in regards to the following questions for apt on 04/29/2024 with Carmina Banks.     Will office organize transportation for apt?  2. Does patient need any type of imaging done before apt on 04/29/2024?   3. If imaging is needed can imaging be done at [facility]?

## 2024-04-04 NOTE — TELEPHONE ENCOUNTER
Noted that patient's  has messaged asking if patient will be provided transport to NOV:    [facility] Future Appointments    Encounter Information   Provider Department Center   4/29/2024 10:15 AM Carmina Banks APRN       Patient underwent surgery with Dr. Romero on 3.21.24:    LEFT CRANIOTOMY FOR HEMATOMA EVACUATION     Per ALISON Malagon in her Progress Note on 3.26.24:    \"Assessment:  62 yo female with left frontal IPH s/p hematoma evacuation     Plan:  Keppra per Neuro  PT/OT/SLP - would benefit from acute rehab, family prefers [facility], awaiting PMR eval  MRI brain - no underlying pathology  DVT prophylaxis SCDs, OK for chemoprophylaxis  Staples to be removed after 4/5, ok to remove in rehab  Follow up in 1 month in clinic with a head CT\"    Called Mr. Ching and discussed items below:    -patient will have staples removed at [facility] and imaging completed there as well.   -discussed the need to bring a disc from their facility for ROD to upload.  Patient's spouse acknowledged.   -Per Mr. Ching, they prefer to have patient seen in [place] [facility] due to transport issues.   -they prefer to follow up with [facility] and or Neurosurgery in [place] as transport to [place] or [place] will be a hardship on family since their main residence is now in [place].      Informed Mr. Ching that since patient has been established with [facility], a [facility] Neurosurgeon may decline to see her in her first post op year, but the subject may be addressed with Dr. Romero.    -Discussed starting with a Neurologist in [place] since patient is not going to be seeing [facility] until June.     Mr. Ching acknowledged and stated that patient is doing very well, and they are extremely grateful for Dr. Romero's care and that to let ALISON Lozada know that the switch to [place] post op appointment is not related to seeing her, as it is a logistics issue.     Discussed  that we do not provide medical transport to appointments and that he may call his insurance carrier to inquire about a company he may use if necessary.     Mr. Ching thanked Nursing for the information and the discussion and the call was ended.     New appointment:    4/22/2024 9:15 AM Ace Wilson PA-C     Routed to ALISON Malagon and to Dr. Romero.

## 2024-04-18 ENCOUNTER — TELEPHONE (OUTPATIENT)
Dept: SURGERY | Facility: CLINIC | Age: 64
End: 2024-04-18

## 2024-04-18 NOTE — TELEPHONE ENCOUNTER
Received a call from Marty, a radiologist from [facility].    Stated as he received the results for pt CT BRAIN/HEAD, he wanted to reach out to make sure we were aware of the findings.    Informed Marty I would inform the APRN of the findings.    Number to call ([phone]) was given to APRN.    TONNY routed to APRN.

## 2024-04-22 ENCOUNTER — OFFICE VISIT (OUTPATIENT)
Dept: SURGERY | Facility: CLINIC | Age: 64
End: 2024-04-22
Payer: MEDICARE

## 2024-04-22 VITALS
HEIGHT: 64 IN | DIASTOLIC BLOOD PRESSURE: 77 MMHG | HEART RATE: 85 BPM | WEIGHT: 130 LBS | BODY MASS INDEX: 22.2 KG/M2 | SYSTOLIC BLOOD PRESSURE: 116 MMHG

## 2024-04-22 DIAGNOSIS — Z48.89 ENCOUNTER FOR POSTOPERATIVE WOUND CARE: ICD-10-CM

## 2024-04-22 DIAGNOSIS — I62.9 INTRACRANIAL HEMORRHAGE (HCC): ICD-10-CM

## 2024-04-22 DIAGNOSIS — Z98.890 POSTOPERATIVE STATE: ICD-10-CM

## 2024-04-22 DIAGNOSIS — R29.898 RIGHT ARM WEAKNESS: ICD-10-CM

## 2024-04-22 DIAGNOSIS — R47.01 EXPRESSIVE APHASIA: ICD-10-CM

## 2024-04-22 DIAGNOSIS — Z98.890 S/P CRANIOTOMY: Primary | ICD-10-CM

## 2024-04-22 DIAGNOSIS — S06.5XAA SUBDURAL HEMATOMA (HCC): ICD-10-CM

## 2024-04-22 RX ORDER — ENOXAPARIN SODIUM 100 MG/ML
40 INJECTION SUBCUTANEOUS DAILY
COMMUNITY

## 2024-04-22 RX ORDER — PANTOPRAZOLE SODIUM 40 MG/1
40 TABLET, DELAYED RELEASE ORAL
COMMUNITY

## 2024-04-22 RX ORDER — GAUZE BANDAGE 2" X 2"
100 BANDAGE TOPICAL DAILY
COMMUNITY

## 2024-04-22 RX ORDER — ASCORBIC ACID 500 MG
500 TABLET ORAL DAILY
COMMUNITY

## 2024-04-22 RX ORDER — CHLORHEXIDINE GLUCONATE 20 %
SOLUTION, NON-ORAL MISCELLANEOUS
COMMUNITY

## 2024-04-22 RX ORDER — PRAVASTATIN SODIUM 20 MG
TABLET ORAL
COMMUNITY
Start: 2024-04-21

## 2024-04-22 RX ORDER — CHLORAL HYDRATE 500 MG
CAPSULE ORAL
COMMUNITY

## 2024-04-22 NOTE — TELEPHONE ENCOUNTER
Patient came in office today for OV with RIGOBERTO Bello. Patient brought in disk, CT Head/Brain W/O Contrast from [employer] dated on 4/18/24. Disc was downloaded via PACS.

## 2024-04-22 NOTE — PROGRESS NOTES
Established Neurosurgery Patient    Patient: Leanne Ching  Medical Record Number: [id number]  YOB: 1960  PCP: Shelby Frost MD    Reason for visit: 1 month postop visit    HISTORY OF PRESENTING ILLNESS:  Leanne Ching is a pleasant 63 year old female who returns to the neurosurgery clinic today approximately 1 month s/p left craniotomy for a left frontal intraparenchymal hemorrhage evacuation by Dr. Romero on 3/21/2024.  The patient was discharged to [facility] ability lab, which is where she currently resides temporarily, but will be discharged this week and recommended 4-day rehab.  She has noticed significant improvements in her symptoms since surgery.  She continues to have intermittent word finding difficulty, but states that she is able to speak in full sentences and carry on a conversation appropriately.  She states that she will eventually remember the word that she is trying to express.  Denies any dysarthria.  She has not had any headaches, changes in vision or speech, nausea/vomiting, or dizziness/lightheadedness.  She reports that her right lower extremity has returned to near normal.  She continues to have some weakness and discoordination in the right upper extremity, but overall has seen significant improvements.  She reports that she is doing well at rehab.  She was able to walk with the therapist outside of [facility] to the local [employer], which was approximately 5 blocks, without an assistive device.  Her , who presents with her to the office today, has continued to notice improvements.  She has no new neurologic complaints at this time.  No issues with her incision site.  Denies any constitutional symptoms or symptoms concerning for sepsis.    Past Medical History:    Diverticulosis large intestine w/o perforation or abscess w/o bleeding    High cholesterol    Hyperlipidemia    Hypertension    Internal hemorrhoids      Past Surgical History:   Procedure Laterality Date     Ablation      Colonoscopy N/A 2018    Procedure: COLONOSCOPY;  Surgeon: Jacinda Cruz MD;  Location: [facility] ENDOSCOPY    Eeg phy/qhp ea incr w/veeg  2024          x 2    Other surgical history  2024    LEFT CRANIOTOMY FOR HEMATOMA EVACUATION      Family History   Problem Relation Age of Onset    Cancer Paternal Grandmother 70        UTERINE      Social History     Socioeconomic History    Marital status:    Tobacco Use    Smoking status: Never    Smokeless tobacco: Never   Substance and Sexual Activity    Alcohol use: No    Drug use: No      Allergies   Allergen Reactions    Atorvastatin OTHER (SEE COMMENTS)     Memory issues      Current Medications:  Current Outpatient Medications   Medication Sig Dispense Refill    pantoprazole 40 MG Oral Tab EC Take 1 tablet (40 mg total) by mouth every morning before breakfast.      enoxaparin 40 MG/0.4ML Injection Solution Prefilled Syringe Inject 0.4 mL (40 mg total) into the skin daily.      pravastatin 20 MG Oral Tab       Chlorhexidine Gluconate Does not apply Solution       losartan 25 MG Oral Tab Take 1 tablet (25 mg total) by mouth daily. 30 tablet 0    acetaminophen 325 MG Oral Tab Take 2 tablets (650 mg total) by mouth every 4 (four) hours as needed.      ascorbic acid 500 MG Oral Tab Take 1 tablet (500 mg total) by mouth daily.      omega-3 fatty acids 1000 MG Oral Cap Take by mouth.      thiamine 100 MG Oral Tab Take 1 tablet (100 mg total) by mouth daily.      levETIRAcetam 500 MG Oral Tab Take 3 tablets (1,500 mg total) by mouth 2 (two) times daily. 270 tablet 0    amLODIPine 5 MG Oral Tab Take 1 tablet (5 mg total) by mouth daily. (Patient not taking: Reported on 2024)          REVIEW OF SYSTEMS:  Comprehensive review of systems completed and negative with the exception of aforementioned information in the HPI.     PHYSICAL EXAM:  /77 (BP Location: Left arm, Patient Position: Sitting, Cuff Size: adult)    Pulse 85   Ht 64\"   Wt 130 lb (59 kg)   BMI 22.31 kg/m²   Body mass index is 22.31 kg/m².  Wt Readings from Last 6 Encounters:   04/22/24 130 lb (59 kg)   03/23/24 137 lb 12.6 oz (62.5 kg)   03/20/24 126 lb 8.7 oz (57.4 kg)   05/17/18 158 lb (71.7 kg)   05/09/18 158 lb (71.7 kg)        General: Well developed, well nourished, in no acute distress.    Incision: Healing well.  Open air.  No erythema, warmth, swelling, gross drainage appreciated.    HEENT: Normocephalic, atraumatic.    Respirations: Non-labored     Neurologic / Musculoskeletal: Awake, alert, oriented, and interactive. Recent and remote memory appear intact. Attention span and concentration are appropriate. No dysarthria.  Intermittent expressive aphasia and word finding difficulties.  Able to form full sentences and carry on a conversation appropriately.  Patient follows commands briskly and appropriately.  Right upper extremity drift.  Dysmetria with finger-nose on the right.    Cervical Spine:    Motor/ Extremities   Deltoid Biceps Triceps    Sensation   R 4/5 4+/5 4+/5 4+/5 Intact to light touch   L 5/5 5/5 5/5 5/5 Intact to light touch       Lumbar Spine:    Motor / Extremities   Hip   flexion Knee   extension Dorsiflexion Plantarflexion   Sensation   R 5/5 5/5 5/5 5/5 Intact to light touch   L 5/5 5/5 5/5 5/5 Intact to light touch     Cranial Nerves:  I Smell not tested   II Visual acuity at baseline.  Visual fields intact.  Pupils equal, round, reactive to light and accommodating bilaterally.   III, VII No ptosis appreciated   III, IV, VI EOMs intact with full ROM   V Facial sensation intact to light touch   VII No facial asymmetry   VIII Hearing normal to voice   IX, X Soft palate elevation and gag reflex not tested   XI Spinal accessory muscles intact with 5/5 strength bilaterally   XII Tongue strength normal, midline, without fasciculations or deviations       IMAGING:  Preoperative and postoperative CT brain reviewed in detail with the  patient and her .  Most recent CT brain from 4/18/2024 also reviewed with the patient and  in detail.  Imaging was reviewed by Dr. Romero prior to clinic.  Images available on PACS.  Demonstrate expected postsurgical changes, with the addition of a new left frontal hyperdense collection in the subdural space, concerning for a small, acute subdural hematoma.  There is minimal mass effect and no significant midline shift.      ASSESSMENT / PLAN:    ICD-10-CM   1. S/P craniotomy  Z98.890              2. Intracranial hemorrhage (HCC)  I62.9              3. Postoperative state  Z98.890      4. Encounter for postoperative wound care  Z48.89      5. Subdural hematoma (HCC)  S06.5XAA      6. Right arm weakness  R29.898      7. Expressive aphasia  R47.01        Leanne Ching returns to the clinic today approximately 1 month s/p left craniotomy for left frontal intraparenchymal hematoma evacuation by Dr. Romero on 3/21/2024.  The patient has continued to progress well from a neurosurgical standpoint.  She will be discharged from [facility] ability [facility] this week and will follow-up in day rehab.  She has had continued improvements in her right hemibody strength, as well as her speech.  She is neurologically stable on examination.  No signs/symptoms concerning for infection, locally or systemically.  Her most recent outpatient CT brain scan demonstrated concern for a small, acute left frontal SDH.  We will plan to get a CT brain scan in 2 weeks from today to ensure stability.  If stable to improved, will we will plan for 3-month postoperative follow-up with Dr. Romero.  The patient should complete a MRI and an MRA of the brain with and without contrast a day or 2 prior to that 3-month appointment for review.  The 3-month follow-up plan may change pending her 2-week repeat CT brain scan.  She may need a cerebral angiogram pending the results of the CT.  The patient prefers that her  be called to  update them with the results of the CT brain scan.  I advised the patient and her  warning signs/symptoms that would warrant immediate evaluation in the ED, which includes, but is not limited to, intractable headaches and nausea/vomiting and the BE FAST acronym.     -Medications Prescribed: None  -Imaging Ordered: CT brain without contrast in 2 weeks, MRI/MRA brain with and without contrast at 3-month appointment  -Referrals Placed: None  -Follow up: Will call patient's  in 2 weeks with results per patient's preference  -AED management per neurology.  Patient has a follow-up with neurology on 6/25/2024 at 1130    Plan formulated in collaboration with Dr. Romero, who also saw the patient today.  Plan was reviewed and discussed in detail with the patient. Patient encouraged to call the office with any questions or concerns of new/worsening neurologic symptoms. Patient demonstrated good understanding and was agreeable with the plan.     Visit time: 40 minutes   Over 50% of that time was spent providing patient education and discussing care plan.    Ace Wilson PA-C  Physician Assistant- Neurosurgery   [facility]  4/22/2024, 12:35 PM

## 2024-04-22 NOTE — PROGRESS NOTES
Patient presents today for a post operative visit from left frontal intraparenchymal hematoma evacuation performed on 03/21/2024 by Dr. Romero. Patient has right arm weakness and states there is improvement on right sided leg weakness with the help of therpay. Patient is in [facility] and she said they are helping with slurred speech.

## 2024-05-07 ENCOUNTER — PATIENT MESSAGE (OUTPATIENT)
Dept: SURGERY | Facility: CLINIC | Age: 64
End: 2024-05-07

## 2024-05-07 ENCOUNTER — HOSPITAL ENCOUNTER (OUTPATIENT)
Dept: CT IMAGING | Facility: HOSPITAL | Age: 64
Discharge: HOME | End: 2024-05-07
Attending: STUDENT IN AN ORGANIZED HEALTH CARE EDUCATION/TRAINING PROGRAM
Payer: MEDICARE

## 2024-05-07 DIAGNOSIS — I62.9 INTRACRANIAL HEMORRHAGE (HCC): ICD-10-CM

## 2024-05-07 DIAGNOSIS — Z98.890 S/P CRANIOTOMY: ICD-10-CM

## 2024-05-07 PROCEDURE — 70450 CT HEAD/BRAIN W/O DYE: CPT | Performed by: STUDENT IN AN ORGANIZED HEALTH CARE EDUCATION/TRAINING PROGRAM

## 2024-05-08 NOTE — TELEPHONE ENCOUNTER
From: Leanne Ching  To: Ace Wilson  Sent: 5/7/2024 5:48 PM CDT  Subject: CT Scan Follow up    Leanne Chamorro had her CT Scan at [place] based on your test order.   We will continue outpatient therapy at [facility] in [facility] tomorrow.   We await your CT Scan test result review.   We have scheduled MRI/MRA in June based on your orders.

## 2024-05-09 ENCOUNTER — TELEPHONE (OUTPATIENT)
Dept: SURGERY | Facility: CLINIC | Age: 64
End: 2024-05-09

## 2024-05-09 NOTE — TELEPHONE ENCOUNTER
Patient sent [employer] message on 5/7/24 after CT scan was done inquiring about results. Please review and advise.     Patient last seen 4/22/24 by Ace Wilson. Assessment and Plan below.     ASSESSMENT / PLAN:      ICD-10-CM   1. S/P craniotomy  Z98.890                   2. Intracranial hemorrhage (HCC)  I62.9                   3. Postoperative state  Z98.890       4. Encounter for postoperative wound care  Z48.89       5. Subdural hematoma (HCC)  S06.5XAA       6. Right arm weakness  R29.898       7. Expressive aphasia  R47.01          Leanne Ching returns to the clinic today approximately 1 month s/p left craniotomy for left frontal intraparenchymal hematoma evacuation by Dr. Romero on 3/21/2024.  The patient has continued to progress well from a neurosurgical standpoint.  She will be discharged from [facility] ability lab this week and will follow-up in day rehab.  She has had continued improvements in her right hemibody strength, as well as her speech.  She is neurologically stable on examination.  No signs/symptoms concerning for infection, locally or systemically.  Her most recent outpatient CT brain scan demonstrated concern for a small, acute left frontal SDH.  We will plan to get a CT brain scan in 2 weeks from today to ensure stability.  If stable to improved, will we will plan for 3-month postoperative follow-up with Dr. Romero.  The patient should complete a MRI and an MRA of the brain with and without contrast a day or 2 prior to that 3-month appointment for review.  The 3-month follow-up plan may change pending her 2-week repeat CT brain scan.  She may need a cerebral angiogram pending the results of the CT.  The patient prefers that her  be called to update them with the results of the CT brain scan.  I advised the patient and her  warning signs/symptoms that would warrant immediate evaluation in the ED, which includes, but is not limited to, intractable headaches and  nausea/vomiting and the BE FAST acronym.      -Medications Prescribed: None  -Imaging Ordered: CT brain without contrast in 2 weeks, MRI/MRA brain with and without contrast at 3-month appointment  -Referrals Placed: None  -Follow up: Will call patient's  in 2 weeks with results per patient's preference  -AED management per neurology.  Patient has a follow-up with neurology on 6/25/2024 at 1130     Plan formulated in collaboration with Dr. Romero, who also saw the patient today.  Plan was reviewed and discussed in detail with the patient. Patient encouraged to call the office with any questions or concerns of new/worsening neurologic symptoms. Patient demonstrated good understanding and was agreeable with the plan.

## 2024-05-09 NOTE — TELEPHONE ENCOUNTER
Pt's  called to see if RIGOBERTO Wilson has had a chance to review imaging. Pt's  would like to know if there has been any changes? Please advise.

## 2024-05-13 NOTE — TELEPHONE ENCOUNTER
Verified Name and     Patient's  called had not heard from PA in regards  to previous message. Advised patient a My chart message was sent to Mrs. Ching . Patient aware of Ace's message and was very appreciative of the message.

## 2024-05-20 ENCOUNTER — CLINICAL ABSTRACT (OUTPATIENT)
Dept: CARE COORDINATION | Age: 64
End: 2024-05-20

## 2024-06-19 ENCOUNTER — TELEPHONE (OUTPATIENT)
Dept: NEUROLOGY | Facility: CLINIC | Age: 64
End: 2024-06-19

## 2024-06-19 NOTE — TELEPHONE ENCOUNTER
Pts  called asking to speak with clinical staff about getting his wifes hospital follow up moved up. Per  this apt is important to discuss imaging results and get her released. Please give  a callback to discuss further.

## 2024-06-20 ENCOUNTER — LAB ENCOUNTER (OUTPATIENT)
Dept: LAB | Facility: HOSPITAL | Age: 64
End: 2024-06-20
Attending: Other

## 2024-06-20 ENCOUNTER — OFFICE VISIT (OUTPATIENT)
Dept: NEUROLOGY | Facility: CLINIC | Age: 64
End: 2024-06-20

## 2024-06-20 VITALS — BODY MASS INDEX: 22.2 KG/M2 | WEIGHT: 130 LBS | HEIGHT: 64 IN

## 2024-06-20 DIAGNOSIS — R56.9 SEIZURE (HCC): ICD-10-CM

## 2024-06-20 DIAGNOSIS — I62.9 INTRACRANIAL BLEED (HCC): ICD-10-CM

## 2024-06-20 DIAGNOSIS — S06.320A INTRAPARENCHYMAL HEMATOMA OF BRAIN, LEFT, WITHOUT LOSS OF CONSCIOUSNESS, INITIAL ENCOUNTER (HCC): Primary | ICD-10-CM

## 2024-06-20 PROCEDURE — 36415 COLL VENOUS BLD VENIPUNCTURE: CPT

## 2024-06-20 PROCEDURE — 80177 DRUG SCRN QUAN LEVETIRACETAM: CPT

## 2024-06-20 PROCEDURE — 99214 OFFICE O/P EST MOD 30 MIN: CPT | Performed by: OTHER

## 2024-06-20 RX ORDER — LOSARTAN POTASSIUM 25 MG/1
TABLET ORAL
COMMUNITY
Start: 2024-05-08

## 2024-06-20 RX ORDER — ROSUVASTATIN CALCIUM 10 MG/1
10 TABLET, COATED ORAL NIGHTLY
COMMUNITY
Start: 2024-05-08

## 2024-06-20 RX ORDER — LEVETIRACETAM 500 MG/1
1500 TABLET ORAL 2 TIMES DAILY
Qty: 270 TABLET | Refills: 3 | Status: SHIPPED | OUTPATIENT
Start: 2024-06-20

## 2024-06-20 NOTE — PROGRESS NOTES
[facility]  [address] [place] [address], [address]  [facility] [address]  [phone]              Neurology Follow up Visit     Referred By: Dr. Weir ref. provider found    Chief Complaint:   Chief Complaint   Patient presents with    Hospital F/U     Pt presents as a follow from hospital visit on 03/20/24 for Intracerebral Hemorrhage. Pt  would like to know when can the patient travel and how long will she be on the her seizure medication?Pt state her speech needs improvement and would like for the doctor to extend her speech therapy.Pt is taking levETIRAcetam 500 MG Oral Tab. Pt states she has right shoulder pain, and weak right hand, and arm.       HPI:     Leanne Ching is a 63 year old female, who presents for history of intracerebral hemorrhage.  Patient was seen in March 2024, with difficulty with speech, sudden onset of right upper extremity weakness.  She was not hypertensive, she was not on antithrombotic therapy.  CT of the head showed left frontal intraparenchymal hematoma.  Patient was transferred to [place].  Evacuation of hematoma was done.  She did develop some possible seizures and dose of Keppra was increased until continuous EEG has improved.  Patient was planning to see neurosurgeon, apparently MRI of the brain and MRA of the head was ordered.   No more seizures is reported by the patient.  She was doing quite well with physical therapy.  Significant movement of right upper extremity but still not fully, still feeling some clumsiness and some occasional word finding difficulty.  Although overall significantly has improved.    Past Medical History:    Diverticulosis large intestine w/o perforation or abscess w/o bleeding    High cholesterol    Hyperlipidemia    Hypertension    Internal hemorrhoids       Past Surgical History:   Procedure Laterality Date    Ablation  2012    Colonoscopy N/A 5/18/2018    Procedure: COLONOSCOPY;  Surgeon: Jacinda Cruz  MD;  Location: [facility] ENDOSCOPY    Eeg phy/qhp ea incr w/veeg  2024          x 2    Other surgical history  2024    LEFT CRANIOTOMY FOR HEMATOMA EVACUATION       Social history:  History   Smoking Status    Never   Smokeless Tobacco    Never     History   Alcohol Use No     History   Drug Use No       Family History   Problem Relation Age of Onset    Cancer Paternal Grandmother 70        UTERINE         Current Outpatient Medications:     rosuvastatin 10 MG Oral Tab, Take 1 tablet (10 mg total) by mouth nightly., Disp: , Rfl:     losartan 25 MG Oral Tab, , Disp: , Rfl:     levETIRAcetam 500 MG Oral Tab, Take 3 tablets (1,500 mg total) by mouth 2 (two) times daily., Disp: 270 tablet, Rfl: 3    pantoprazole 40 MG Oral Tab EC, Take 1 tablet (40 mg total) by mouth every morning before breakfast., Disp: , Rfl:     enoxaparin 40 MG/0.4ML Injection Solution Prefilled Syringe, Inject 0.4 mL (40 mg total) into the skin daily., Disp: , Rfl:     ascorbic acid 500 MG Oral Tab, Take 1 tablet (500 mg total) by mouth daily., Disp: , Rfl:     omega-3 fatty acids 1000 MG Oral Cap, Take by mouth., Disp: , Rfl:     thiamine 100 MG Oral Tab, Take 1 tablet (100 mg total) by mouth daily., Disp: , Rfl:     Chlorhexidine Gluconate Does not apply Solution, , Disp: , Rfl:     acetaminophen 325 MG Oral Tab, Take 2 tablets (650 mg total) by mouth every 4 (four) hours as needed., Disp: , Rfl:     amLODIPine 5 MG Oral Tab, Take 1 tablet (5 mg total) by mouth daily. (Patient not taking: Reported on 2024), Disp: , Rfl:     Allergies   Allergen Reactions    Atorvastatin OTHER (SEE COMMENTS)     Memory issues       ROS:   As in HPI, the rest of the 14 system review was done and was negative      Physical Exam:  Vitals:    24 1528   Weight: 130 lb (59 kg)   Height: 64\"       General: No apparent distress, well nourished, well groomed.  Head- Normocephalic, atraumatic  Eyes- No redness or swelling  ENT- Hearing intake, normal  glutition  Neck- No masses or adenopathy  Cv: pulses were palpable and normal, no cyanosis or edema     Neurological:     Mental Status- Alert and oriented x3.  Normal attention span and concentration  Thought process intact  Memory intact- recent and remote  Mood intact  Fund of knowledge appropriate for education and age    Language intermittently some residual aphasic symptoms noted.    Cranial Nerves:    VII. Face symmetric, no facial weakness  VIII. Hearing intact to whisper.  IX. Pallet elevates symmetrically.  XI. Shoulder shrug is intact  XII. Tongue is midline    Motor Exam:  Muscle tone normal  No atrophy or fasciculations  Strength- upper extremities 5/5 proximally and distally on the left, 4+ on the right,                      Labs:    No results found for: \"TSH\"  Lab Results   Component Value Date    TRIG 182 (H) 03/27/2024     Lab Results   Component Value Date    HGB 12.7 03/27/2024    HCT 38.4 03/27/2024    MCV 88.7 03/27/2024    WBC 5.3 03/27/2024    .0 03/27/2024      Lab Results   Component Value Date    BUN 19 03/27/2024    CA 9.6 03/27/2024    ALT 15 03/19/2024    AST 25 03/19/2024    ALB 4.9 (H) 03/19/2024     03/27/2024    K 4.5 03/27/2024     03/27/2024    CO2 26.0 03/27/2024      I have reviewed labs.      Assessment   1. Intraparenchymal hematoma of brain, left, without loss of consciousness, initial encounter ([facility])  Postsurgery, has improved with speech therapy individual therapy, but still not fully, therefore occupational and speech therapy will be reordered at [facility].    - Speech Therapy Referral - [facility]  - Occupational Therapy Referral - [facility]    2. Intracranial bleed (HCC)  Patient is planning to follow-up with neurosurgeon to discuss MRI and MRA results that will be done soon.  - Speech Therapy Referral - [facility]  - Occupational Therapy Referral - [facility]    3. Seizure (HCC)  At this point I would not suggest to  taper down Keppra since she is tolerating it quite well.  Levels of Keppra will be checked.  We discussed pros and cons of tapering down medication in the evening and decided to keep Keppra  - Levetiracetam, S [E]; Future           Education and counseling provided to patient. Instructed patient to call my office or seek medical attention immediately if symptoms worsen.  Patient verbalized understanding of information given. All questions were answered. All side effects of drugs were discussed.     Return to clinic in: Return in about 3 months (around 9/20/2024).    Carl Portillo MD

## 2024-06-21 ENCOUNTER — TELEPHONE (OUTPATIENT)
Dept: SURGERY | Facility: CLINIC | Age: 64
End: 2024-06-21

## 2024-06-21 DIAGNOSIS — S06.5XAA SUBDURAL HEMATOMA (HCC): ICD-10-CM

## 2024-06-21 DIAGNOSIS — I62.9 INTRACRANIAL BLEED (HCC): ICD-10-CM

## 2024-06-21 DIAGNOSIS — R47.01 EXPRESSIVE APHASIA: ICD-10-CM

## 2024-06-21 DIAGNOSIS — R93.0 ABNORMAL MRI OF HEAD: ICD-10-CM

## 2024-06-21 DIAGNOSIS — Z98.890 S/P CRANIOTOMY: Primary | ICD-10-CM

## 2024-06-21 DIAGNOSIS — G93.6 VASOGENIC BRAIN EDEMA (HCC): ICD-10-CM

## 2024-06-21 NOTE — TELEPHONE ENCOUNTER
Rcvd a call from Registration advising pt is scheduled for MRI and MRA brain tomorrow. There is an ABN from medicare and a passing dx code is needed.

## 2024-06-22 ENCOUNTER — HOSPITAL ENCOUNTER (OUTPATIENT)
Dept: MRI IMAGING | Facility: HOSPITAL | Age: 64
Discharge: HOME | End: 2024-06-22
Attending: STUDENT IN AN ORGANIZED HEALTH CARE EDUCATION/TRAINING PROGRAM

## 2024-06-22 DIAGNOSIS — Z98.890 S/P CRANIOTOMY: ICD-10-CM

## 2024-06-22 DIAGNOSIS — I62.9 INTRACRANIAL BLEED (HCC): ICD-10-CM

## 2024-06-22 DIAGNOSIS — R47.01 EXPRESSIVE APHASIA: ICD-10-CM

## 2024-06-22 DIAGNOSIS — I62.9 INTRACRANIAL HEMORRHAGE (HCC): ICD-10-CM

## 2024-06-22 DIAGNOSIS — R93.0 ABNORMAL MRI OF HEAD: ICD-10-CM

## 2024-06-22 DIAGNOSIS — G93.6 VASOGENIC BRAIN EDEMA (HCC): ICD-10-CM

## 2024-06-22 DIAGNOSIS — S06.5XAA SUBDURAL HEMATOMA (HCC): ICD-10-CM

## 2024-06-22 PROCEDURE — 70553 MRI BRAIN STEM W/O & W/DYE: CPT | Performed by: STUDENT IN AN ORGANIZED HEALTH CARE EDUCATION/TRAINING PROGRAM

## 2024-06-22 PROCEDURE — A9575 INJ GADOTERATE MEGLUMI 0.1ML: HCPCS | Performed by: STUDENT IN AN ORGANIZED HEALTH CARE EDUCATION/TRAINING PROGRAM

## 2024-06-22 PROCEDURE — 70544 MR ANGIOGRAPHY HEAD W/O DYE: CPT | Performed by: NURSE PRACTITIONER

## 2024-06-22 RX ORDER — GADOTERATE MEGLUMINE 376.9 MG/ML
15 INJECTION INTRAVENOUS
Status: COMPLETED | OUTPATIENT
Start: 2024-06-22 | End: 2024-06-22

## 2024-06-22 RX ADMIN — GADOTERATE MEGLUMINE 15 ML: 376.9 INJECTION INTRAVENOUS at 09:41:00

## 2024-06-24 ENCOUNTER — PATIENT MESSAGE (OUTPATIENT)
Dept: NEUROLOGY | Facility: CLINIC | Age: 64
End: 2024-06-24

## 2024-06-24 LAB — LEVETIRACETAM LVL: 47.7 UG/ML

## 2024-06-24 NOTE — TELEPHONE ENCOUNTER
MRA and MRI Brain has been completed and ALISON Malagon adjusted diagnosis code on 6.21.24 for Medicare coverage.

## 2024-06-24 NOTE — TELEPHONE ENCOUNTER
From: Leanne Ching  To: Carl Portillo  Sent: 6/24/2024 6:04 AM CDT  Subject: After visit blood test for Seizure Medicine dosage    Dr. Portillo, did we get the results of the blood test? Any decision on lowering of dosage for Keppra? Thanks.

## 2024-07-08 ENCOUNTER — OFFICE VISIT (OUTPATIENT)
Dept: SURGERY | Facility: CLINIC | Age: 64
End: 2024-07-08
Payer: MEDICARE

## 2024-07-08 VITALS
HEART RATE: 94 BPM | HEIGHT: 64 IN | WEIGHT: 130 LBS | BODY MASS INDEX: 22.2 KG/M2 | SYSTOLIC BLOOD PRESSURE: 119 MMHG | DIASTOLIC BLOOD PRESSURE: 76 MMHG

## 2024-07-08 DIAGNOSIS — R47.01 APHASIA: Primary | ICD-10-CM

## 2024-07-08 DIAGNOSIS — I61.9 HEMORRHAGIC STROKE (HCC): ICD-10-CM

## 2024-07-08 PROCEDURE — 99214 OFFICE O/P EST MOD 30 MIN: CPT | Performed by: NEUROLOGICAL SURGERY

## 2024-07-08 NOTE — PROGRESS NOTES
[facility]  Neurological Surgery Clinic Note    Lenane Ching  1960  [id number]  PCP: Shelby Frost MD    REASON FOR VISIT:  S/p left frontal IPH evacuation    HISTORY OF PRESENT ILLNESS:  Leanne Ching is a 64 year old female who presents to clinic for routine follow up after left frontal IPH evacuation on 3/21/24.  She has made a fantastic neurologic recovery.  She still has some difficulty with right arm/hand coordination and has persistent mild expressive aphasia. She is able to walk multiple miles per day.  She denies any ongoing headache. MRI/MRA brain w/wo 24 demonstrates no underlying lesion concerning for a source of hemorrhage.    PAST MEDICAL HISTORY:  Past Medical History:    Diverticulosis large intestine w/o perforation or abscess w/o bleeding    High cholesterol    Hyperlipidemia    Hypertension    Internal hemorrhoids       PAST SURGICAL HISTORY:  Past Surgical History:   Procedure Laterality Date    2012    Colonoscopy N/A 2018    Procedure: COLONOSCOPY;  Surgeon: Jacinda Cruz MD;  Location: [facility] ENDOSCOPY    Eeg phy/qhp ea incr w/veeg  2024          x 2    Other surgical history  2024    LEFT CRANIOTOMY FOR HEMATOMA EVACUATION       FAMILY HISTORY:  family history includes Cancer (age of onset: 70) in her paternal grandmother.    SOCIAL HISTORY:   reports that she has never smoked. She has never used smokeless tobacco. She reports that she does not drink alcohol and does not use drugs.    ALLERGIES:  Allergies   Allergen Reactions    Atorvastatin OTHER (SEE COMMENTS)     Memory issues       MEDICATIONS:  Current Outpatient Medications on File Prior to Visit   Medication Sig Dispense Refill    levetiracetam 500 MG Oral Tab Take 2 tablets (1,000 mg total) by mouth 2 (two) times daily. 360 tablet 0    rosuvastatin 10 MG Oral Tab Take 1 tablet (10 mg total) by mouth nightly.      losartan 25 MG Oral  Tab        No current facility-administered medications on file prior to visit.       REVIEW OF SYSTEMS:  A 10-point system was reviewed.  Pertinent positives and negatives are noted in HPI.      PHYSICAL EXAMINATION:  VITAL SIGNS: /76 (BP Location: Left arm, Patient Position: Sitting, Cuff Size: adult)   Pulse 94   Ht 64\"   Wt 130 lb (59 kg)   BMI 22.31 kg/m²     A&Ox3, no acute distress, with mild word finding difficulty   PERRL, EOMi  Mild right nasolabial fold flattening  Mild right arm drift  Right hand apraxia  Otherwise full strength  Incision well healed    ASSESSMENT:  63yo female s/p L frontal IPH evacuation    I spoke to the patient and her  about the current clinical situation and the plan of care. We discussed the signs and symptoms for which to seek emergent medical attention. They expressed understanding and appreciation.    Plan:  - Follow up with neurology as scheduled for AED management  - Referral placed for continued speech therapy  - She may follow up with me as needed    Jimbo Romero MD  Neurological Surgery  [facility] Time: 30 min including face to face time, chart review, imaging interpretation, and coordination of care

## 2024-09-20 ENCOUNTER — OFFICE VISIT (OUTPATIENT)
Dept: NEUROLOGY | Facility: CLINIC | Age: 64
End: 2024-09-20
Payer: MEDICARE

## 2024-09-20 VITALS — BODY MASS INDEX: 22.2 KG/M2 | HEIGHT: 64 IN | WEIGHT: 130 LBS

## 2024-09-20 DIAGNOSIS — S06.320A INTRAPARENCHYMAL HEMATOMA OF BRAIN, LEFT, WITHOUT LOSS OF CONSCIOUSNESS, INITIAL ENCOUNTER (HCC): Primary | ICD-10-CM

## 2024-09-20 DIAGNOSIS — R56.9 SEIZURE (HCC): ICD-10-CM

## 2024-09-20 PROCEDURE — 99214 OFFICE O/P EST MOD 30 MIN: CPT | Performed by: OTHER

## 2024-09-20 RX ORDER — LEVETIRACETAM 250 MG/1
250 TABLET ORAL 2 TIMES DAILY
Qty: 180 TABLET | Refills: 0 | Status: SHIPPED | OUTPATIENT
Start: 2024-09-20

## 2024-09-20 RX ORDER — LEVETIRACETAM 1000 MG/1
1000 TABLET ORAL 2 TIMES DAILY
COMMUNITY
Start: 2024-08-28 | End: 2024-09-20

## 2024-09-20 RX ORDER — LEVETIRACETAM 250 MG/1
250 TABLET ORAL 2 TIMES DAILY
COMMUNITY
Start: 2024-08-29 | End: 2024-09-20

## 2024-09-20 RX ORDER — LEVETIRACETAM 1000 MG/1
1000 TABLET ORAL 2 TIMES DAILY
Qty: 180 TABLET | Refills: 3 | Status: SHIPPED | OUTPATIENT
Start: 2024-09-20

## 2024-09-20 NOTE — PROGRESS NOTES
[facility] [address], [address]  [facility] [address]  [phone]              Neurology Follow up Visit     Referred By: Dr. Weir ref. provider found    Chief Complaint:   Chief Complaint   Patient presents with    Neurologic Problem     LOV: 6/20/24 Patient is following on Intracerebral hemorrhage. C/O right hand  is not has strong and drops things. Patient current medication: levetiracetam 1000 MG and 250 MG. Patient has done labs, seen OT and ST.       HPI:     Leanne Ching is a 64 year old female, who presents for history of intracerebral hemorrhage.  Patient was seen in March 2024, with difficulty with speech, sudden onset of right upper extremity weakness.  She was not hypertensive, she was not on antithrombotic therapy.  CT of the head showed left frontal intraparenchymal hematoma.  Patient was transferred to [place].  Evacuation of hematoma was done.  She did develop some possible seizures and dose of Keppra was increased until continuous EEG has improved.  Patient was planning to see neurosurgeon, apparently MRI of the brain and MRA of the head was ordered.   No more seizures is reported by the patient.  She was doing quite well with physical therapy.  Significant movement of right upper extremity but still not fully, still feeling some clumsiness and some occasional word finding difficulty.  Although overall significantly has improved.    Patient physical therapy was already ordered at that time.  Patient was continued with Keppra.  Patient was seen by neurosurgeons on the follow-up visit in July 2024, no new additional intervention was required.    Level of Keppra was somewhat elevated and therefore the dose was decreased to 1250 mg twice a day.  On a follow-up visit in September 2024 patient was doing well, no side effects, no breakthrough seizures.  She was improving in terms of her speech and hand function.      Past Medical History:    Diverticulosis  large intestine w/o perforation or abscess w/o bleeding    High cholesterol    Hyperlipidemia    Hypertension    Internal hemorrhoids       Past Surgical History:   Procedure Laterality Date    Ablation      Colonoscopy N/A 2018    Procedure: COLONOSCOPY;  Surgeon: Jacinda Cruz MD;  Location: [facility] ENDOSCOPY    Eeg phy/qhp ea incr w/veeg  2024          x 2    Other surgical history  2024    LEFT CRANIOTOMY FOR HEMATOMA EVACUATION       Social history:  History   Smoking Status    Never   Smokeless Tobacco    Never     History   Alcohol Use No     History   Drug Use No       Family History   Problem Relation Age of Onset    Cancer Paternal Grandmother 70        UTERINE         Current Outpatient Medications:     levetiracetam 1000 MG Oral Tab, Take 1 tablet (1,000 mg total) by mouth 2 (two) times daily., Disp: , Rfl:     levETIRAcetam 250 MG Oral Tab, Take 1 tablet (250 mg total) by mouth 2 (two) times daily., Disp: , Rfl:     rosuvastatin 10 MG Oral Tab, Take 1 tablet (10 mg total) by mouth nightly., Disp: , Rfl:     losartan 25 MG Oral Tab, , Disp: , Rfl:     Allergies   Allergen Reactions    Atorvastatin OTHER (SEE COMMENTS)     Memory issues       ROS:   As in HPI, the rest of the 14 system review was done and was negative      Physical Exam:  Vitals:    24 1151   Weight: 130 lb (59 kg)   Height: 64\"       General: No apparent distress, well nourished, well groomed.  Head- Normocephalic, atraumatic  Eyes- No redness or swelling  ENT- Hearing intake, normal glutition  Neck- No masses or adenopathy  Cv: pulses were palpable and normal, no cyanosis or edema     Neurological:     Mental Status- Alert and oriented x3.  Normal attention span and concentration  Thought process intact  Memory intact- recent and remote  Mood intact  Fund of knowledge appropriate for education and age    Language intermittently some residual aphasic symptoms noted.    Cranial Nerves:    VII. Face  symmetric, no facial weakness  VIII. Hearing intact to whisper.  IX. Pallet elevates symmetrically.  XI. Shoulder shrug is intact  XII. Tongue is midline    Motor Exam:  Muscle tone normal  No atrophy or fasciculations  Strength- upper extremities 5/5 proximally and distally on the left, 4+ on the right,                      Labs:    No results found for: \"TSH\"  Lab Results   Component Value Date    TRIG 182 (H) 03/27/2024     Lab Results   Component Value Date    HGB 12.7 03/27/2024    HCT 38.4 03/27/2024    MCV 88.7 03/27/2024    WBC 5.3 03/27/2024    .0 03/27/2024      Lab Results   Component Value Date    BUN 19 03/27/2024    CA 9.6 03/27/2024    ALT 15 03/19/2024    AST 25 03/19/2024    ALB 4.9 (H) 03/19/2024     03/27/2024    K 4.5 03/27/2024     03/27/2024    CO2 26.0 03/27/2024      I have reviewed labs.      Assessment   1. Intraparenchymal hematoma of brain, left, without loss of consciousness, initial encounter (HCC)  Postsurgery, has improved with speech therapy individual therapy, but still not fully, therefore occupational and speech therapy will be reordered at [facility].    - Speech Therapy Referral - [place] Locations  - Occupational Therapy Referral - [facility]    2. Intracranial bleed (HCC)  No intervention is required based on neurosurgery opinion.    3. Seizure (HCC)  Keppra will be continued at 1250 mg twice a day.  We will recheck the level to make sure it is within therapeutic range at the trough level.  Had long discussion expectations, prognosis seizure precautions           Education and counseling provided to patient. Instructed patient to call my office or seek medical attention immediately if symptoms worsen.  Patient verbalized understanding of information given. All questions were answered. All side effects of drugs were discussed.     Return to clinic in: No follow-ups on file.    Carl Portillo MD

## 2024-09-28 ENCOUNTER — LAB ENCOUNTER (OUTPATIENT)
Dept: LAB | Facility: HOSPITAL | Age: 64
End: 2024-09-28
Attending: Other
Payer: MEDICARE

## 2024-09-28 DIAGNOSIS — R56.9 SEIZURE (HCC): ICD-10-CM

## 2024-09-28 PROCEDURE — 80177 DRUG SCRN QUAN LEVETIRACETAM: CPT

## 2024-09-28 PROCEDURE — 36415 COLL VENOUS BLD VENIPUNCTURE: CPT

## 2024-10-02 LAB — LEVETIRACETAM LVL: 39.3 UG/ML

## 2024-10-23 ENCOUNTER — APPOINTMENT (OUTPATIENT)
Dept: OBGYN | Age: 64
End: 2024-10-23

## 2024-11-22 ENCOUNTER — APPOINTMENT (OUTPATIENT)
Dept: OBGYN | Age: 64
End: 2024-11-22

## 2024-11-22 VITALS
OXYGEN SATURATION: 100 % | TEMPERATURE: 97.8 F | BODY MASS INDEX: 22.76 KG/M2 | HEART RATE: 74 BPM | SYSTOLIC BLOOD PRESSURE: 126 MMHG | DIASTOLIC BLOOD PRESSURE: 78 MMHG | RESPIRATION RATE: 18 BRPM | WEIGHT: 133.3 LBS | HEIGHT: 64 IN

## 2024-11-22 DIAGNOSIS — Z86.79 HISTORY OF INTRACRANIAL HEMORRHAGE: ICD-10-CM

## 2024-11-22 DIAGNOSIS — M85.88 OSTEOPENIA OF LUMBAR SPINE: ICD-10-CM

## 2024-11-22 DIAGNOSIS — Z78.0 POSTMENOPAUSAL STATUS, AGE-RELATED: ICD-10-CM

## 2024-11-22 DIAGNOSIS — Z12.31 SCREENING MAMMOGRAM FOR BREAST CANCER: Primary | ICD-10-CM

## 2024-11-22 RX ORDER — LEVETIRACETAM 750 MG/1
750 TABLET ORAL 2 TIMES DAILY
COMMUNITY

## 2024-11-22 RX ORDER — LOSARTAN POTASSIUM 25 MG/1
25 TABLET ORAL DAILY
COMMUNITY

## 2024-11-22 RX ORDER — ROSUVASTATIN CALCIUM 10 MG/1
10 TABLET, COATED ORAL DAILY
COMMUNITY

## 2024-11-22 ASSESSMENT — PAIN SCALES - GENERAL: PAINLEVEL: 0

## 2025-01-25 ENCOUNTER — HOSPITAL ENCOUNTER (OUTPATIENT)
Dept: MAMMOGRAPHY | Facility: HOSPITAL | Age: 65
Discharge: HOME | End: 2025-01-25
Attending: OBSTETRICS & GYNECOLOGY
Payer: MEDICARE

## 2025-01-25 DIAGNOSIS — Z12.31 BREAST CANCER SCREENING BY MAMMOGRAM: ICD-10-CM

## 2025-01-25 PROCEDURE — 77067 SCR MAMMO BI INCL CAD: CPT | Performed by: OBSTETRICS & GYNECOLOGY

## 2025-01-25 PROCEDURE — 77063 BREAST TOMOSYNTHESIS BI: CPT | Performed by: OBSTETRICS & GYNECOLOGY

## (undated) DEVICE — SUT ETHLN 3-0 30IN FSL NABSRB BLK 30MM 3/8 CI

## (undated) DEVICE — PERFORATOR SUR AD PED L14MM DIA11MM RND CUT

## (undated) DEVICE — SLEEVE COMPR MD KNEE LEN SGL USE KENDALL SCD

## (undated) DEVICE — MARKER SKIN PREP RESIST STRL

## (undated) DEVICE — Device

## (undated) DEVICE — KIT HEMSTAT MTRX 8ML PORCINE GEL HUM THROM

## (undated) DEVICE — CRANIOTOMY CDS: Brand: MEDLINE INDUSTRIES, INC.

## (undated) DEVICE — Device: Brand: DEFENDO AIR/WATER/SUCTION AND BIOPSY VALVE

## (undated) DEVICE — COVER LT HNDL RIG FOR SUR CAM DISP

## (undated) DEVICE — SPHERE NAVI 4 MRK PASS STLTH STN

## (undated) DEVICE — GLOVE SUR 7.5 SENSICARE PI PIP GRN PWD F

## (undated) DEVICE — DRAPE,TOWEL,LARGE,INVISISHIELD: Brand: MEDLINE

## (undated) DEVICE — HOOK RETRCT L12MM E 2 BLNT LONE STAR

## (undated) DEVICE — SUT PROL 10-0 5IN BV100-4 NABSRB BLU L5.1MM 3

## (undated) DEVICE — SPONGE GZ 4XL4IN 100% COT 12 PLY TYP VII WVN

## (undated) DEVICE — 3M™ TEGADERM™ TRANSPARENT FILM DRESSING, 1626W, 4 IN X 4-3/4 IN (10 CM X 12 CM), 50 EACH/CARTON, 4 CARTON/CASE: Brand: 3M™ TEGADERM™

## (undated) DEVICE — BANDAGE,GAUZE,BULKEE II,4.5"X4.1YD,STRL: Brand: MEDLINE

## (undated) DEVICE — ENDOSCOPY PACK - LOWER: Brand: MEDLINE INDUSTRIES, INC.

## (undated) DEVICE — PAD SACRAL SPAN AID

## (undated) DEVICE — CONTAINER,SPECIMEN,PNEU TUBE,4OZ,OR STRL: Brand: MEDLINE

## (undated) DEVICE — SUT VCRL 0 18IN CT-1 ABSRB VLT CR L36MM 1/2

## (undated) DEVICE — SUT PROL 6-0 30IN BV-1 NABSRB BLU 9.3MM 3/8 C

## (undated) DEVICE — Device: Brand: INTELLICART™

## (undated) DEVICE — SOLUTION IRRIG 1000ML 0.9% NACL USP BTL

## (undated) DEVICE — SOLUTION RUBBING 4OZ 70% ISO ALC CLR

## (undated) DEVICE — OCCLUSIVE GAUZE STRIP OVERWRAP,3% BISMUTH TRIBROMOPHENATE IN PETROLATUM BLEND: Brand: XEROFORM

## (undated) DEVICE — DRAPE FLD WRM W44XL66IN TRNSPAR POLYUR ORS

## (undated) DEVICE — RANEY SCALP CLIP STERILE: Brand: AESCULAP

## (undated) DEVICE — 3M™ IOBAN™ 2 ANTIMICROBIAL INCISE DRAPE 6648EZ: Brand: IOBAN™ 2

## (undated) DEVICE — COVER,MAYO STAND,STERILE: Brand: MEDLINE

## (undated) DEVICE — GLOVE SUR 7.5 SENSICARE PI PIP CRM PWD F

## (undated) DEVICE — 3M™ TEGADERM™ TRANSPARENT FILM DRESSING FRAME STYLE, 1624W, US-VER, 100/CARTON 4 CARTONS/CASE: Brand: 3M™ TEGADERM™

## (undated) DEVICE — GOWN,SIRUS,FABRIC-REINFORCED,X-LARGE: Brand: MEDLINE

## (undated) DEVICE — BIPOLAR IRRIGATOR INTEGRATED TUBING AND BIPOLAR CORD SET, DISPOSABLE

## (undated) DEVICE — ZEISS MD DRAPE

## (undated) DEVICE — DISPOSABLE DUAL IRRIGATING BIPOLAR FORCEPS, NON-STICK,: Brand: SPETZLER-MALIS

## (undated) DEVICE — SUT PLN GUT 5-0 18IN PC-1 ABSRB TAN YELLOWISH

## (undated) DEVICE — 2.3MM SPIRAL ROUTER

## (undated) DEVICE — SUTURE VCRL 2-0 L18IN ABSRB UD CR L26MM CP-2

## (undated) NOTE — IP AVS SNAPSHOT
Patient Demographics     Address  [address]  [facility] [address] Phone  [phone] (Home)  [phone] (Mobile) *Preferred* E-mail Address  [email or web address]      Patient Contacts     Name Relation Home Work Mobile    Hosea Ching Spouse   [phone]    kT Ching Son   [phone]      Allergies as of 3/29/2024  Review status set to Review Complete on 3/20/2024   No Known Allergies     Code Status Information     Code Status    Full Code        Patient Instructions       OK to removed staples in rehab on 4/5    Diet Recommendations - Solids: Soft/ Easy to chew  Diet Recommendations - Liquids: Thin Liquids     Compensatory Strategies Recommended: No straws;Slow rate;Alternate consistencies;Small bites and sips;Extra sauce/gravy  Aspiration Precautions: Upright position;Slow rate;Small bites and sips;No straw  Medication Administration Recommendations: Crushed in puree;Whole in puree     Follow-up Information     Carmina Banks APRN Follow up on 4/29/2024.    Specialties: Nurse Practitioner, NEUROSURGERY  Why: at 10:15  Please get head CT before your visit  If you are still in rehab, please call to coordinate  Contact information:  [address]  [facility] [id number] [phone]             Carl Portillo MD. Go on 6/25/2024.    Specialty: NEUROLOGY  Why: @11:30am for your Neuro/Stroke follow up appointment    “SRAL to call Dr Carl Portillo to cancel appointment, if patient is not discharged by this date.”  Contact information:  [address] [facility] [address]  [facility] [id number] [phone]             Shelby Frost MD. Call in 1 week(s).    Specialty: Internal Medicine  Contact information:  [address]  [facility] [address]  [phone]             Luis Waller MD Follow up.    Specialty: PULMONARY DISEASES  Why: As needed  Contact information:  [address]  [facility] [address]  [phone]                        Your Home Meds List       TAKE these medications       Instructions Authorizing Provider Morning Afternoon Evening As Needed   acetaminophen 325 MG Tabs  Commonly known as: Tylenol  Next dose due: As needed      Take 2 tablets (650 mg total) by mouth every 4 (four) hours as needed.   GAGE DEAL         amLODIPine 5 MG Tabs  Commonly known as: Norvasc  Next dose due: Tomorrow 3/30 in AM      Take 1 tablet (5 mg total) by mouth daily.   GAGE DEAL         levETIRAcetam 500 MG Tabs  Commonly known as: Keppra  Next dose due: Tonight 3/29      Take 3 tablets (1,500 mg total) by mouth 2 (two) times daily.   Adelina Morris         losartan 25 MG Tabs  Commonly known as: Cozaar  Next dose due: Tomorrow 3/30 in AM      Take 1 tablet (25 mg total) by mouth daily.  Stop taking on: April 27, 2024   Rene Dueñas         simvastatin 10 MG Tabs  Commonly known as: Zocor  Next dose due: Tonight 3/29      Take 1 tablet (10 mg total) by mouth nightly.                Where to Get Your Medications      These medications were sent to [id number] - [place], [place] - [address] AT [facility], [phone], [phone]  [address], [facility] [address]    Phone: [phone]   levETIRAcetam 500 MG Tabs  losartan 25 MG Tabs           [id number] - MAR ACTION REPORT  (last 48 hrs)    ** SITE UNKNOWN **     Order ID Medication Name Action Time Action Reason Comments    [id number] losartan (Cozaar) tab 25 mg 03/28/24 0849 Given      [id number] losartan (Cozaar) tab 25 mg 03/29/24 1046 Given      [id number] pravastatin (Pravachol) tab 20 mg 03/28/24 2031 Given            LEFT LOWER ABDOMEN     Order ID Medication Name Action Time Action Reason Comments    [id number] enoxaparin (Lovenox) 40 MG/0.4ML SUBQ injection 40 mg 03/28/24 0849 Given            RIGHT LOWER ABDOMEN     Order ID Medication Name Action Time Action Reason Comments    [id number] enoxaparin (Lovenox) 40 MG/0.4ML SUBQ injection 40 mg  24 1046 Given              Recent Vital Signs    Flowsheet Row Most Recent Value   /81 Filed at 2024 1200   Pulse 83 Filed at 2024 1200   Resp 18 Filed at 2024 1200   Temp 98.3 °F (36.8 °C) Filed at 2024 1200   SpO2 96 % Filed at 2024 1200      Patient's Most Recent Weight    Flowsheet Row Most Recent Value   Patient Weight 62.5 kg (137 lb 12.6 oz)      Lab Results Last 24 Hours    No matching results found     Microbiology Results (All)     Procedure Component Value Units Date/Time    Emergency MRSA Screen by PCR [[id number]]  (Normal) Collected: 24 0854    Order Status: Completed Lab Status: Final result Updated: 24 1016    Specimen: Other from [place]      MRSA Screen By PCR Negative         H&P - H&P Note      H&P signed by Mandeep Bojorquez DO at 3/21/2024  6:13 AM  Version 1 of 1    Author: Mandeep Bojorquez DO Service: Hospitalist Author Type: Physician    Filed: 3/21/2024  6:13 AM Date of Service: 3/20/2024 10:37 PM Status: Signed    : Mandeep Bojorquez DO (Physician)         [facility]     Leanne Ching Patient Status:  Inpatient    1960 MRN [id number]   Location [facility] 6NE-A Attending Mandeep Bojorquez*   Hosp Day # 0 PCP Shelby Frost MD     Chief Complaint: Slurred speech and right upper extremity weakness    Subjective:    History of Present Illness:     Leanne Ching is a 63 year old female with history of hypertension, hyperlipidemia presented to [facility] last night with sudden onset of slurred speech and right arm weakness.  Patient was starting to have some difficulty with speaking around 1030 last night.  It was after the slurred speech that she started having some right arm weakness.  No headache and, vision changes.  Neurosurgery here [facility] reviewed the case and recommended transfer to [facility] for diagnostic cerebral angiogram to be  done tomorrow morning.  So far there is been no progression or regression of patient's symptoms.  No fevers, chills, vomiting, diarrhea.  No urinary or fecal incontinence.     History/Other:    Past Medical History:  Past Medical History:   Diagnosis Date    Diverticulosis large intestine w/o perforation or abscess w/o bleeding 2018    High cholesterol     Hyperlipidemia     Internal hemorrhoids 2018     Past Surgical History:   Past Surgical History:   Procedure Laterality Date    ABLATION      COLONOSCOPY N/A 2018    Procedure: COLONOSCOPY;  Surgeon: Jacinda Cruz MD;  Location: [facility] ENDOSCOPY          x 2      Family History:   Family History   Problem Relation Age of Onset    Cancer Paternal Grandmother 70        UTERINE     Social History:    reports that she has never smoked. She has never used smokeless tobacco. She reports that she does not drink alcohol and does not use drugs.     Allergies: No Known Allergies    Medications:    Current Facility-Administered Medications on File Prior to Encounter   Medication Dose Route Frequency Provider Last Rate Last Admin    [COMPLETED] iopamidol (ISOVUE-370) 76 % injection 50 mL  50 mL Intravenous ONCE PRN Riley Angeles MD   50 mL at 24 0004    [COMPLETED] labetalol (Trandate) 5 mg/mL injection 20 mg  20 mg Intravenous Once Riley Angeles MD   20 mg at 24 0009    [COMPLETED] labetalol (Trandate) 5 mg/mL injection 10 mg  10 mg Intravenous Once Riley Angeles MD   10 mg at 24 0021     Current Outpatient Medications on File Prior to Encounter   Medication Sig Dispense Refill    niCARdipine in sodium chloride 0.86% 20 mg/200mL Intravenous Solution Inject 5-15 mg/hr into the vein continuous.      acetaminophen 325 MG Oral Tab Take 2 tablets (650 mg total) by mouth every 4 (four) hours as needed.      ondansetron 4 MG/2ML Injection Solution Inject 2 mL (4 mg total) into the vein every 6 (six) hours as needed.       polyethylene glycol, PEG 3350, 17 g Oral Powd Pack Take 17 g by mouth daily as needed (If no bowel movement in last 24 hours).      sennosides 17.2 MG Oral Tab Take 1 tablet (17.2 mg total) by mouth nightly as needed (constipation, as needed if no bowel movement that day).      bisacodyl 10 MG Rectal Suppos Place 1 suppository (10 mg total) rectally daily as needed.      [START ON 3/21/2024] sodium chloride 0.9% PF 0.9% SOLN 10 mL with pantoprazole 40 MG SOLR 40 mg Inject 40 mg into the vein daily.      amLODIPine 5 MG Oral Tab Take 1 tablet (5 mg total) by mouth daily.      sodium chloride 0.9% Intravenous Inject into the vein continuous.      simvastatin 10 MG Oral Tab Take 1 tablet (10 mg total) by mouth nightly.         Review of Systems:   A comprehensive review of systems was completed.    Pertinent positives and negatives noted in the HPI.    Objective:   Physical Exam:    /69   Pulse 82   Temp 97.6 °F (36.4 °C) (Temporal)   Resp 16   SpO2 97%   General: No acute distress, Alert  Respiratory: No rhonchi, no wheezes  Cardiovascular: S1, S2. Regular rate and rhythm  Abdomen: Soft, Non-tender, non-distended, positive bowel sounds  Neuro: MS in RUE +3/5, MS LUE +5/5  Extremities: No edema      Results:    Labs:      Labs Last 24 Hours:    Recent Labs   Lab 03/19/24  2354   RBC 4.59   HGB 13.0   HCT 41.3   MCV 90.0   MCH 28.3   MCHC 31.5   RDW 14.0   NEPRELIM 4.16   WBC 9.2   .0       Recent Labs   Lab 03/19/24  2354   *   BUN 14   CREATSERUM 0.82   EGFRCR 79   CA 10.2   ALB 4.9*      K 4.0      CO2 26.0   ALKPHO 66   AST 25   ALT 15   BILT 0.5   TP 7.9       Lab Results   Component Value Date    INR 0.89 03/19/2024       No results for input(s): \"TROP\", \"TROPHS\", \"CK\" in the last 168 hours.    No results for input(s): \"TROP\", \"PBNP\" in the last 168 hours.    No results for input(s): \"PCT\" in the last 168 hours.    Imaging: Imaging data reviewed in Epic.    Assessment &  Plan:      # Left frontal intraparenchymal hematoma- will admit to NICU  - neurosurgery on consult  -cerebral angiogram in the am  -continue ICH protocol    # Vasogenic edema  - mild    # Prediabetes  - Will monitor blood sugars for now  -HgbA1c 6.0.    Plan of care discussed with patient at bedside  Mandeep Bojorquez DO  Total critical care time: 45minutes  22:45-23:30    Supplementary Documentation:     The  Cures Act makes medical notes like these available to patients in the interest of transparency. Please be advised this is a medical document. Medical documents are intended to carry relevant information, facts as evident, and the clinical opinion of the practitioner. The medical note is intended as peer to peer communication and may appear blunt or direct. It is written in medical language and may contain abbreviations or verbiage that are unfamiliar.               **Certification      PHYSICIAN Certification of Need for Inpatient Hospitalization - Initial Certification    Patient will require inpatient services that will reasonably be expected to span two midnight's based on the clinical documentation in H+P.   Based on patients current state of illness, I anticipate that, after discharge, patient will require TBD.                  Electronically signed by Mandeep Bojorquez DO on 3/21/2024  6:13 AM              Consults - MD Consult Notes      Consults signed by Nia Otto MD at 3/26/2024 12:08 PM     Author: Nia Otto MD Service: Physical Medicine and Rehabilitation Author Type: Physician    Filed: 3/26/2024 12:08 PM Date of Service: 3/26/2024 11:59 AM Status: Signed    : Nia Otto MD (Physician)     Consult Orders    1. Consult to Physical Medicine Rehab [[id number]] ordered by Adelina Morris MD at 24 1459             .[facility]    Leanne Ching Patient Status:  Inpatient    1960 MRN [id number]   [address] Attending  Adelina Morris MD   Hosp Day # 6 PCP Shelby Frost MD     Patient Identification  Leanne Ching is a 63 year old female.  :  1960  Admit Date:  3/20/2024  Attending Provider:  Adelina Morris MD                                  Primary Care Physician:  Shelby Frost MD   Admitting Diagnosis: ICH (intracerebral hemorrhage) (HCC) [I61.9]    Subjective:      Reason for Consultation: Impaired ADL and mobility dysfuction due to Left craniotomy for intraparenchymal hematoma evacuation  History of present illness:  Records reviewed, and patient examined.Consult Requested by:     Leanne Ching is a 63 year old female with history of hypertension, hyperlipidemia presented to [facility] 3/20/24t with sudden onset of slurred speech and right arm weakness.     CT/CTA 3/20/24 CONCLUSION:    Large left frontal intraparenchymal hematoma again noted.  There is associated internal serpiginous enhancement, which may represent an abnormal tangle of vessels.  Finding raises the possibility of an underlying vascular malformation.  Neuro surgical   consultation has already been placed at this time.  Further assessment with diagnostic interventional angiography and/or contrast enhanced MRI brain would be helpful.    Local mass effect from left frontal intraparenchymal hematoma as well as left to right 5 millimeter midline shift again noted.     Was transferred to [facility] for Neurosurgical intervention.    Angio 3/21 neg vasc malformation, mri/a/v brain w/wo neg for vasc malformation/mass.     On 3/22/24 underwent Left craniotomy for intraparenchymal hematoma evacuation by      [facility] for seizure proph.  VEEG Impression:  This is a Abnormal prolonged Video EEG study.   Bilateral independent temporal sharp wave activity was noted. These waveforms are epileptogenic in nature. No electrographic seizures were noted.   Intermittent slow wave activity in delta range was also noted.  At times  these waveforms were quite rhythmic since raising suspicions for electrographic seizures.  No clinical manifestations were noted.  The overall frequency and duration of these activity significantly decreased after increasing AED.  These findings can also be seen in encephalopathy due to metabolic/toxic etiology, medication effects or diffuse cerebral injury.    Clinical correlation is recommended    Per NS, OK for chemoprophylaxis     Has headaches and expressive aphasia.  Tolerating MS/thins  No seizures/emesis      Physiatry consult obtained now to assess pt's funtional status and make appropriate recommendations.      HOME SITUATION  Type of Home: House   Home Layout: Two level  Stairs to Enter : 5  Stairs to Bedroom: 8  Railing: Yes     Lives With: Spouse  Patient Owned Equipment: None     Prior Level of [place]: Per EMR, pt lives at home with her spouse. Pt is IND with all ADLs and does not use an assisted device to ambulate.    Current Functional Status:     Bed Mobility:  Rolling: Mod A  Supine to sit: Mod A          Sit to supine: NT                  Transfer Mobility:  Sit to stand: Mod A Pt was given verbal and tactile cues for improve hand placement and feet placement  Stand to sit: Mod A  Gait = NT    Past Medical History:  @Medical hx@  Past Medical History:   Diagnosis Date    Diverticulosis large intestine w/o perforation or abscess w/o bleeding 2018    High cholesterol     Hyperlipidemia     Hypertension 3/21/2024    Internal hemorrhoids 2018       Past Surgical History:   Procedure Laterality Date    ABLATION      COLONOSCOPY N/A 2018    Procedure: COLONOSCOPY;  Surgeon: Jacinda Cruz MD;  Location: [facility] ENDOSCOPY    EEG PHY/QHP EA INCR W/VEEG  3/24/2024          x 2        potassium chloride 20 mEq/100mL IVPB premix 20 mEq  20 mEq Intravenous Once    ipratropium-albuterol (Duoneb) 0.5-2.5 (3) MG/3ML inhalation solution 3 mL  3 mL Nebulization Q6H PRN     enoxaparin (Lovenox) 40 MG/0.4ML SUBQ injection 40 mg  40 mg Subcutaneous Daily    [COMPLETED] potassium phosphate dibasic 15 mmol in sodium chloride 0.9% 250 mL IVPB  15 mmol Intravenous Once    Followed by    [COMPLETED] potassium chloride 20 mEq/100mL IVPB premix 20 mEq  20 mEq Intravenous Once    [COMPLETED] gadoterate meglumine (Dotarem) 7.5 MMOL/15ML injection 15 mL  15 mL Intravenous ONCE PRN    [COMPLETED] levETIRAcetam (Keppra) 3,750 mg in sodium chloride 0.9% 100 mL IVPB  60 mg/kg Intravenous Once    And    levETIRAcetam (Keppra) 500 mg/5mL injection 1,500 mg  1,500 mg Intravenous Q12H    glucose (Dex4) 15 GM/59ML oral liquid 15 g  15 g Oral Q15 Min PRN    Or    glucose (Glutose) 40% oral gel 15 g  15 g Oral Q15 Min PRN    Or    glucose-vitamin C (Dex-4) chewable tab 4 tablet  4 tablet Oral Q15 Min PRN    Or    dextrose 50% injection 50 mL  50 mL Intravenous Q15 Min PRN    Or    glucose (Dex4) 15 GM/59ML oral liquid 30 g  30 g Oral Q15 Min PRN    Or    glucose (Glutose) 40% oral gel 30 g  30 g Oral Q15 Min PRN    Or    glucose-vitamin C (Dex-4) chewable tab 8 tablet  8 tablet Oral Q15 Min PRN    senna (Senokot) 8.8 MG/5ML oral syrup 17.6 mg  10 mL Oral Nightly PRN    labetalol (Trandate) 5 mg/mL injection 10 mg  10 mg Intravenous Q4H PRN    insulin aspart (NovoLOG) 100 Units/mL FlexPen 1-5 Units  1-5 Units Subcutaneous 4 times per day    [COMPLETED] potassium chloride 40 mEq in 250mL sodium chloride 0.9% IVPB premix  40 mEq Intravenous Once    midazolam (Versed) 2 MG/2ML injection 2 mg  2 mg Intravenous Q15 Min PRN    traMADol (Ultram) tab 50 mg  50 mg Oral Q6H PRN    [COMPLETED] potassium chloride 40 mEq in 250mL sodium chloride 0.9% IVPB premix  40 mEq Intravenous Once    [COMPLETED] fentaNYL (Sublimaze) 50 mcg/mL injection        [COMPLETED] midazolam (Versed) 2 MG/2ML injection        [COMPLETED] heparin (Porcine) 5000 UNIT/ML injection        [COMPLETED] heparin (Porcine) 5000 UNIT/ML injection         [COMPLETED] lidocaine (Xylocaine) 1 % injection        [COMPLETED] nitroGLYCERIN (Nitrobid) 2 % ointment        [COMPLETED] heparin (Porcine) 5000 UNIT/ML injection        [COMPLETED] verapamil (Isoptin) 2.5 mg/mL injection        [COMPLETED] Nitroglycerin in D5W 200-5 MCG/ML-% injection        [COMPLETED] iodixanol (VISIPAQUE) 320 MG/ML injection 300 mL  300 mL Injection ONCE PRN    niCARdipine in sodium chloride 0.86% (carDENE) 20 mg/200mL infusion premix  5-15 mg/hr Intravenous Continuous PRN    [COMPLETED] LORazepam (Ativan) 2 mg/mL injection 2 mg  2 mg Intravenous Once    [COMPLETED] iopamidol (ISOVUE-370) 76 % injection 50 mL  50 mL Intravenous ONCE PRN    [COMPLETED] labetalol (Trandate) 5 mg/mL injection 20 mg  20 mg Intravenous Once    [COMPLETED] labetalol (Trandate) 5 mg/mL injection 10 mg  10 mg Intravenous Once    sodium chloride 0.9% infusion   Intravenous Continuous    [COMPLETED] labetalol (Trandate) 5 mg/mL injection 10 mg  10 mg Intravenous Q10 Min PRN    hydrALAzine (Apresoline) 20 mg/mL injection 10 mg  10 mg Intravenous Q2H PRN    ondansetron (Zofran) 4 MG/2ML injection 4 mg  4 mg Intravenous Q6H PRN    Or    metoclopramide (Reglan) 5 mg/mL injection 10 mg  10 mg Intravenous Q8H PRN    melatonin tab 3 mg  3 mg Oral Nightly PRN    polyethylene glycol (PEG 3350) (Miralax) 17 g oral packet 17 g  17 g Oral Daily PRN    sennosides (Senokot) tab 17.2 mg  17.2 mg Oral Nightly PRN    bisacodyl (Dulcolax) 10 MG rectal suppository 10 mg  10 mg Rectal Daily PRN    fleet enema (Fleet) 7-19 GM/118ML rectal enema 133 mL  1 enema Rectal Once PRN       Social History     Tobacco Use    Smoking status: Never    Smokeless tobacco: Never   Substance Use Topics    Alcohol use: No       Family History   Problem Relation Age of Onset    Cancer Paternal Grandmother 70        UTERINE       Allergies:  No Known Allergies        Lab Results   Component Value Date    CREATSERUM 0.51 03/26/2024    BUN 13 03/26/2024    NA  141 03/26/2024    K 3.7 03/26/2024     03/26/2024    CO2 26.0 03/26/2024     03/26/2024    CA 9.2 03/26/2024    MG 2.1 03/26/2024    PHOS 3.2 03/26/2024    PGLU 118 03/26/2024       Review of Systems:  Complete review of systems completed/14 point.  Negative except as that outlined in HPI    OBJECTIVE:    Blood pressure 148/82, pulse 92, temperature 99.4 °F (37.4 °C), temperature source Oral, resp. rate 17, height 5' 5\" (1.651 m), weight 137 lb 12.6 oz (62.5 kg), SpO2 98%.    Intake/Output Summary (Last 24 hours) at 3/26/2024 1159  Last data filed at 3/26/2024 1054  Gross per 24 hour   Intake 870 ml   Output 1700 ml   Net -830 ml       Physical Exam:                                      General: Alert, cooperative, no distress, appears stated age.  Head:  Normocephalic, without obvious abnormality, atraumatic.   Eyes:  Conjunctivae/lids clear. PERRL, EOMs intact. Vision functional.   Ears/Nose/Throat: Hearing intact. Lips, mucosa, and tongue normal. Teeth and gums normal. Moist mucous membranes.     Neck: No neck masses or thyroid enlargement/tenderness/nodules.       Lungs:   Resonant, clear breath sounds, quiet accessory muscles.   Chest wall:  No tenderness or deformity.   Cardiovascular:  Heart with regular rate rhythm, no murmurs appreciated. Radial and pedal pulses good. No cyanosis in all extremities.   Abdomen:   No tenderness guarding or rigidity. Liver and spleen are not enlarged.  Bowel sounds present.                   Musculoskeletal:     Right Upper Extremity:  Strength is 0-1.  ROM WNL.   Left Upper Extremity:  Strength is 4.  ROM WNL.   Right Lower Extremity:  Strength  is 0-1.   ROM WNL.   Left Lower Extremity: Strength  is 5.   ROM WNL.          Neuro: CNII-XII are grossly intact.                   Psychiatric: Awake, alert and aphasic. Following simple commands        Assessment:                                Rehab diagnosis: ADL and  mobility dysfunction due to Left craniotomy for  intraparenchymal hematoma evacuation    Disposition:     Based on pt's current functional and medical status, Acute inpatient rehabilitation is recommended to maximize patient's independence, provide care giver training, and evaluate for home equipment needs with ELOS 3-4 weeks.         Patient would benefit and is able to participate in 3 hours of therapy daily. Patient is anticipated to discharge to 24 hr care when acute inpatient rehabilitation course is complete.        Thank you for the consult.     Nia Otto MD  [facility].        Electronically signed by Nia Otto MD on 3/26/2024 12:08 PM           D/C Summary    No notes of this type exist for this encounter.        Physical Therapy Notes (last 72 hours)      Physical Therapy Note signed by Reina Lam PTA at 3/28/2024 10:04 AM  Version 1 of 1    Author: Reina Lam PTA Service: Rehab Author Type: Physical Therapy Assistant    Filed: 3/28/2024 10:04 AM Date of Service: 3/28/2024  9:52 AM Status: Signed    : Reina Lam PTA (Physical Therapy Assistant)          PHYSICAL THERAPY TREATMENT NOTE - INPATIENT    Room Number: 7615/7615-A     Session: 2    Number of Visits to Meet Established Goals: 3    Presenting Problem: Nontraumatic cortical hemorrhage of L cerebral hemisphere  Co-Morbidities : Hypokalemia, HTN, Internal hemorrhoids    ASSESSMENT   Patient demonstrates fair progress this session, goals  remain in progress. Patient able to begin gait training with assist of one today. Improved sitting and standing balance noted. Patient is oriented to self, family, surrounding and situation. Needed to be oriented to date. Patient actively participates well.     Patient continues to function below baseline with bed mobility, transfers, gait, stair negotiation, and maintaining seated position.  Contributing factors to remaining limitations include decreased functional strength, decreased endurance/aerobic  capacity, impaired   balance, impaired coordination, impaired motor planning, and medical status.  Follows instructions well, needs longer respond time and aphasia noted. Next session anticipate patient to progress transfers and gait.  Physical Therapy will continue to follow patient for duration of hospitalization.    Patient continues to benefit from continued skilled PT services: to facilitate return to prior level of function as patient demonstrates high motivation with excellent tolerance to an intensive therapy program .    PLAN  PT Treatment Plan: Bed mobility;Body mechanics;Endurance;Gait training;Strengthening;Stair training;Transfer training;Balance training  Rehab Potential : Good  Frequency (Obs): 3-5x/week    CURRENT GOALS   Goal #1 Patient is able to demonstrate supine - sit EOB @ level: minimum assistance     Goal #2 Patient is able to demonstrate transfers EOB to/from BSC at assistance level: minimum assistance     Goal #3 Patient is able to ambulate 50 feet with assist device: walker - rolling at assistance level: minimum assistance     Goal #4    Goal #5    Goal #6    Goal Comments: Goals established on 3/25/2024    3/28/2024 all goals ongoing .    SUBJECTIVE  \" I just need some time to think, I will get it.\"    OBJECTIVE  Precautions: None    WEIGHT BEARING RESTRICTION  Weight Bearing Restriction: None                PAIN ASSESSMENT   Ratin  Location: Pt reported no pain       BALANCE                                                                                                                       Static Sitting: Fair  Dynamic Sitting: Fair -           Static Standing: Poor +  Dynamic Standing: Poor    ACTIVITY TOLERANCE      SPO2 95% on room air  / 89      AM-PAC '6-Clicks' INPATIENT SHORT FORM - BASIC MOBILITY  How much difficulty does the patient currently have...  Patient Difficulty: Turning over in bed (including adjusting bedclothes, sheets and blankets)?: A Little   Patient  Difficulty: Sitting down on and standing up from a chair with arms (e.g., wheelchair, bedside commode, etc.): A Lot   Patient Difficulty: Moving from lying on back to sitting on the side of the bed?: A Lot   How much help from another person does the patient currently need...   Help from Another: Moving to and from a bed to a chair (including a wheelchair)?: A Lot   Help from Another: Need to walk in hospital room?: A Lot   Help from Another: Climbing 3-5 steps with a railing?: Total       AM-PAC Score:  Raw Score: 12   Approx Degree of Impairment: 68.66%   Standardized Score (AM-PAC Scale): 35.33   CMS Modifier (G-Code): CL    FUNCTIONAL ABILITY STATUS  Gait Assessment   Functional Mobility/Gait Assessment  Gait Assistance: Maximum assistance  Distance (ft): 8  Assistive Device:  (Basilio walker)  Pattern: R Foot flat;R Foot drag (R lean, dec quad control and buckling noted)    Skilled Therapy Provided    Bed Mobility:  Rolling: cues to flex knees, able to roll to R side with sup.    Supine<>Sit: cues for proper positioning of RUE, LUE support on bed rail and cues for engagement of core/hips, patient able to initiate activity, mod assist to complete task safely. Longer time needed.   Sit<>Supine: NT     Transfer Mobility:  Sit<>Stand: Mod assist of one, cues for hand placement, R quad stability. Repeated three times.   Stand<>Sit: cues for hand placement, mod assist for eccentric control.    Gait: patient amb 8' with Basilio walker and assist of one. patient able to advance RLE, ataxic pattern due to dec motor control, R knee quad control with manual support and mod assist for balance and safety .   R knee soft ace wrap assisted with df.   Therapist's Comments: Pre gait training provided. patient worked in sit to stand training. Facilitated weight shift in standing position ant<> post and side to side. Patient needed L ue support on basilio walker, step forward and back in place with R and L.  Cues provided and R knee  supported to prevent buckling.   Seated balance training performed - wt shift ant<>post, lateral and head turns to improve R side awareness.  RUE wb in lateral lean and facilitated push off. R shoulder and joint protected with manual assist.   Jorge ue and le ROM performed.  R LE unilateral bridge x 5  Bilateral bridge x 5  Hooklying abd with cues for motor control x 10  Heel slides x 10 with assist for motor control  DF and PF  Resisted DF and hip ext.         Patient End of Session: Up in chair;Needs met;Call light within reach;RN aware of session/findings;All patient questions and concerns addressed;Family present    PT Session Time: 43 minutes  Gait Training: 10 minutes  Therapeutic Activity: 15 minutes  Therapeutic Exercise: 5 minutes   Neuromuscular Re-education: 15 minutes                 Physical Therapy Note signed by Reina Lam PTA at 3/27/2024  4:36 PM  Version 1 of 1    Author: Reina Lam PTA Service: Rehab Author Type: Physical Therapy Assistant    Filed: 3/27/2024  4:36 PM Date of Service: 3/27/2024  4:31 PM Status: Signed    : Reina Lam PTA (Physical Therapy Assistant)          PHYSICAL THERAPY TREATMENT NOTE - INPATIENT    Room Number: 7615/7615-A     Session: 1    Number of Visits to Meet Established Goals: 3    Presenting Problem: Nontraumatic cortical hemorrhage of L cerebral hemisphere  Co-Morbidities : Hypokalemia, HTN, Internal hemorrhoids    ASSESSMENT   Patient demonstrates limited progress this session, goals  remain in progress.    Patient continues to function below baseline with bed mobility, transfers, gait, stair negotiation, and maintaining seated position.  Contributing factors to remaining limitations include decreased functional strength, decreased endurance/aerobic capacity, impaired   balance, impaired coordination, impaired motor planning, and medical status.  Next session anticipate patient to progress transfers and gait.  Physical Therapy will  continue to follow patient for duration of hospitalization.    Patient continues to benefit from continued skilled PT services: to facilitate return to prior level of function as patient demonstrates high motivation with excellent tolerance to an intensive therapy program .    PLAN  PT Treatment Plan: Bed mobility;Body mechanics;Endurance;Gait training;Strengthening;Stair training;Transfer training;Balance training  Rehab Potential : Good  Frequency (Obs): 3-5x/week    CURRENT GOALS   Goal #1 Patient is able to demonstrate supine - sit EOB @ level: minimum assistance     Goal #2 Patient is able to demonstrate transfers EOB to/from C at assistance level: minimum assistance     Goal #3 Patient is able to ambulate 50 feet with assist device: walker - rolling at assistance level: minimum assistance     Goal #4    Goal #5    Goal #6    Goal Comments: Goals established on 3/25/2024    3/27/2024 all goals ongoing .    SUBJECTIVE  \" I am doing better.\"    OBJECTIVE  Precautions: None    WEIGHT BEARING RESTRICTION  Weight Bearing Restriction: None                PAIN ASSESSMENT   Ratin  Location: Pt reported no pain       BALANCE                                                                                                                       Static Sitting: Fair -  Dynamic Sitting: Poor +           Static Standing: Poor  Dynamic Standing: Poor -    ACTIVITY TOLERANCE                         O2 WALK         AM-PAC '6-Clicks' INPATIENT SHORT FORM - BASIC MOBILITY  How much difficulty does the patient currently have...  Patient Difficulty: Turning over in bed (including adjusting bedclothes, sheets and blankets)?: A Lot   Patient Difficulty: Sitting down on and standing up from a chair with arms (e.g., wheelchair, bedside commode, etc.): A Lot   Patient Difficulty: Moving from lying on back to sitting on the side of the bed?: A Lot   How much help from another person does the patient currently need...   Help from  Another: Moving to and from a bed to a chair (including a wheelchair)?: A Lot   Help from Another: Need to walk in hospital room?: Total   Help from Another: Climbing 3-5 steps with a railing?: Total       AM-PAC Score:  Raw Score: 10   Approx Degree of Impairment: 76.75%   Standardized Score (AM-PAC Scale): 32.29   CMS Modifier (G-Code): CL    FUNCTIONAL ABILITY STATUS  Gait Assessment   Functional Mobility/Gait Assessment  Gait Assistance: Not tested    Skilled Therapy Provided    Bed Mobility:  Rolling: min assist   Supine<>Sit: max   Sit<>Supine: NT     Transfer Mobility:  Sit<>Stand: Mod assist   Stand<>Sit: mod assist for eccentric control.    Gait: one ue support on bed rail, step forward and back with RLE and LLE. Patient shows R knee dec quad control and difficulty with advancement. Assist provided.     Therapist's Comments: patient worked in sit to stand training. Facilitated weight shift in standing position ant<> post and side to side. Patient needed L ue support on stable surface. Cues provided and R knee supported to prevent buckling.   Seated balance training performed - wt shift ant<>post, lateral and head turns to improve R side awareness.   Jorge ue and le ROM performed.  R UE flaccid.         Patient End of Session: Up in chair;Needs met;Call light within reach;RN aware of session/findings;All patient questions and concerns addressed;Family present    PT Session Time: 33 minutes  Gait Trainin minutes  Therapeutic Activity: 15 minutes  Therapeutic Exercise: 5 minutes   Neuromuscular Re-education: 15 minutes                          Occupational Therapy Notes (last 72 hours)      Occupational Therapy Note signed by Lisha Davies OT at 3/26/2024  8:38 PM  Version 1 of 1    Author: Lisha Davies OT Service: Rehab Author Type: Occupational Therapist    Filed: 3/26/2024  8:38 PM Date of Service: 3/26/2024  3:41 PM Status: Signed    : Lisha Davies OT (Occupational Therapist)        OCCUPATIONAL THERAPY TREATMENT NOTE - INPATIENT     Room Number: 7615/7615-A  Session: 1   Number of Visits to Meet Established Goals: 5    Presenting Problem: nontraumatic cortical hemorrhage of left cerebral hemisphere      ASSESSMENT   Patient demonstrates good  progress this session, goals remain in progress.    Patient continues to function below baseline with toileting, bathing, upper body dressing, lower body dressing, grooming, and BUE use, RUE use .   Contributing factors to remaining limitations include decreased functional strength, decreased functional reach, impaired coordination, limited RUE ROM, and impaired use of dominant hand .  Next session anticipate patient to progress toileting, grooming, eating, bed mobility, and transfers.  Occupational Therapy will continue to follow patient for duration of hospitalization.    Patient continues to benefit from continued skilled OT services: to facilitate return to prior level of function as patient demonstrates high motivation with excellent tolerance to an intensive therapy program .               History:   Patient is a 63 year old female admitted on 3/20/2024 with Presenting Problem: nontraumatic cortical hemorrhage of left cerebral hemisphere. Co-Morbidities : Hypokalemia, HTN, Internal hemorrhoids    WEIGHT BEARING RESTRICTION  Weight Bearing Restriction: None                Recommendations for nursing staff:   Transfers: not assessed this session; per previous notes   Toileting location: bed level versus bedside commode    TREATMENT SESSION:  Patient Start of Session: SF position  FUNCTIONAL TRANSFER ASSESSMENT  Sit to Stand: Edge of Bed  Edge of Bed: Maximum Assist (Max A x2 to come to stand then able to stand with max A x1, pt with significant right lean, RUE flaccid and support provided throughout stand, pt compelted MAx A x2 pivot to chair with one person Max A and 2nd person min A for lines and RUE  management)    BED MOBILITY  Rolling: Maximum  Assist  Supine to Sit : Maximum Assist (to get to EOB adn sit at EOB)    BALANCE ASSESSMENT  Static Sitting: Moderate Assist (with support on LUE holding on to bed post)  Sitting Bilateral: Maximum Assist (to maintain sitting with more difficulty to with lean to left)  Static Standing: Maximum Assist (to maintain standing with significant lean to right)  Standing Bilateral: Maximum Assist (unable to advance feet for side steps, only pivot to chair with Max A)    FUNCTIONAL ADL ASSESSMENT  Eating: Moderate Assist (only able to use LUE)  Grooming Seated: Moderate Assist  LB Dressing Seated: Maximum Assist (for socks)      ACTIVITY TOLERANCE: Patient denied shortness of breath or dizziness                         O2 SATURATIONS       EDUCATION PROVIDED  Patient: UE HEP for ROM; UE HEP for Strengthening; Other (neuro-re-education/neuroplasticity)  Patient's Response to Education: Verbalized Understanding      Equipment used: none  Demonstrates functional use, Would benefit from additional trial      Exercises:    Exercises Repetitions Comments   Scapular elevation     Scapular retraction     Shoulder rolls     Shoulder flexion 10 SROM/PROM   Shoulder abduction     Shoulder internal/external rotation     Forward punch     Elbow flexion     Elbow extension     Forearm pronation/supination     Wrist flexion/extension 10    Gross grasp/fist pumps 10    Ankle pumps     Knee extension     Marching       UPPER EXTREMITY  ROM:  RUE with significantly impaired AROM ; AAROM of RUE wrist and fingers WFL; PROM of RUE shoulder and elbow WFL  LUE WFL  Strength:   RUE  2/5, elbow flexion and extension 1/5, shoulder 0/5  LUE NT, observed to be WNL  Coordination  Gross motor: impaired RUE.  LUE WFL  Fine motor: impaired RUE.   LUE WFL  Sensation: patient denied numbness or tingling, not formally tested  Tone: decreased RUE    Therapist comments:   OT provided tactile and verbal cueing for RUE AAROM with muscle tapping for  facilitation of active movement. R wrist and finger extension noted  with repeated cueing and facilitation, followed by reach/grasp patterns with hand over hand support. OT educated patient on neuro-re-ed and neuroplasticity. OT instructed her to visually focus on and attempt to use RUE often for functional tasks . Patient instructed to grasp bedrails on either side  OT assisted her to grasp with RUE and provided cueing and assist for trunk flexion while in log sitting using BUEs , 5 reps. OT provided set up for bed level oral care . Patient able to brush teeth after assisted with opening containers. OT placed toothbrush in to her R hand with MOD to grasp while pt used L hand to dispense toothpaste. OT provided set up for patient to wash face UE used) , and assisted patient to comb and detangle hair. OT performed ocular ROM screening with both eyes WNL. Patient denied blurry ir double vision. Patient was left in SF position with needs met, bed alarm on , and call light in reach. RN aware of session.  Patient End of Session: All patient questions and concerns addressed;RN aware of session/findings;Call light within reach;Needs met;In bed;Alarm set    SUBJECTIVE  \"I'm an \"    PAIN ASSESSMENT  Ratin  Location: no pain at this time        OBJECTIVE  Precautions: None    AM-PAC ‘6-Clicks’ Inpatient Daily Activity Short Form  -   Putting on and taking off regular lower body clothing?: A Lot  -   Bathing (including washing, rinsing, drying)?: A Lot  -   Toileting, which includes using toilet, bedpan or urinal? : A Lot  -   Putting on and taking off regular upper body clothing?: A Lot  -   Taking care of personal grooming such as brushing teeth?: A Little  -   Eating meals?: A Little    AM-PAC Score:  Score: 14  Approx Degree of Impairment: 59.67%  Standardized Score (AM-PAC Scale): 33.39    PLAN  OT Treatment Plan: Balance activities;Energy conservation/work simplification techniques;ADL training;IADL  training;Continued evaluation;Compensatory technique education;Equipment eval/education;Patient/Family training;Patient/Family education;Endurance training;UE strengthening/ROM;Functional transfer training  Rehab Potential : Good  Frequency: 3-5x/week    OT Goals: ongoing 3/26/24  ADL Goals   Patient will perform grooming: with mod assist  Patient will perform upper body dressing:  with min assist     Functional Transfer Goals  Patient will transfer from supine to sit:  with min assist  Patient will transfer from sit to stand:  with mod assist    OT Session Time: 25 minutes  Self-Care Home Management: 15 minutes    Neuromuscular Re-education: 10 minutes                 Video Swallow Study Notes    No notes of this type exist for this encounter.        SLP Note - SLP Notes      SLP Note signed by Sarah Mena SLP at 3/28/2024  4:07 PM  Version 1 of 1    Author: Sarah Mena SLP Service: Rehab Author Type: SPEECH-LANGUAGE PATHOLOGIST    Filed: 3/28/2024  4:07 PM Date of Service: 3/28/2024  3:53 PM Status: Signed    : Sarah Mena SLP (SPEECH-LANGUAGE PATHOLOGIST)       SPEECH/LANGUAGE/COGNITIVE EVALUATION - INPATIENT    Admission Date: 3/20/2024  Evaluation Date: 03/28/24    Reason for Referral: Stroke protocol    ASSESSMENT & PLAN   ASSESSMENT & IMPRESSION  Was transferred to [facility] from [facility] for Neurosurgical intervention.  On 3/22/24 underwent Left craniotomy for intraparenchymal hematoma evacuation. Contacted patient at the bedside for assessment of speech and language.   Patient's chief complaint of difficulty talking. Patient presented with  nonfluent expressive aphasia and difficulty of understanding abstract commands. She demonstrated halting speech and paraphasic errors with the ability to state necessary key words difficulty with expanding to phrase and sentence length. Suspect apraxia of speech component.  Patient very aware and attempts self correction during  this visit.  See below objective measures for details.  Prior to patient's hospital admission patient independent in all ADLs.    Impairment in expressive and receptive language impacts the ability to complete premorbid tasks and ADL's.  Patient would benefit from ongoing skilled ST services while remaining in-house and upon discharge to address aforementioned deficits. ST is recommended including but not limited to aphasia and apraxia therapy, patient/family education, compensatory strategy practice , speech production strategies training to improve safety, independence and for return to previous level of communication. More in-depth assessment of communication skills is recommended at next level of care. Provided patient  with verbal education regarding results of this evaluation along with treatment plan as well as role of SLP in which understanding was verbalized and was in agreement with this information.       Patient administered the Bedside Western Aphasia Battery- Revised (WAB-R). The WAB-R is an individually administered test designed to evaluate a patient's language function following a stroke, dementia, or there acquired neurological disorder.  The following scores were obtained:      Spontaneous Speech Content 7/10   Spontaneous Speech Fluency 5/10   Auditory Verbal Comprehension Yes/No questions 8/10   Auditory Verbal Comprehension Sequential Commands 10/10   Oral Expression Repetition 5/10   Oral Expression Object Naming 10/10      BEDSIDE APHASIA SCORE: 75 (a score of greater or equal to 93.8 is considered to be within normal limits on the comprehensive Western Aphasia Battery-Revised. This score can be used as a means of comparison on the Bedside Western Aphasia Battery-Revised).       BEDSIDE LANGUAGE SCORE: could not be obtained at this time due to reading and writing subtests deferred.  Level of assistance/compensatory: The patient benefitted from verbal repetition and decreasing rate of speech.     Assessment(s) Administered: WAB Bedside Score     WAB Bedside Score:  (75)             Deficits Identified: Auditory comprehension;Verbal expression;Written expression;Motor speech    Patient Experiencing Pain: No        GOALS  Goal #1 Patient will complete repetition of 3-4 syllable words with 80% accy across 3 sessions with reduced rate.   In Progress   Goal #2 Patient will complete sentences with 1-2 words with 75% accy.     In Progress   Goal #3 Patient will describe pictures with agent + action with 80% accy.   Not Addressed     Prior Living Situation: Home with spouse  Prior Level of Function: Independent      Imaging Results:   CT/CTA 3/20/24 CONCLUSION:    Large left frontal intraparenchymal hematoma again noted.  There is associated internal serpiginous enhancement, which may represent an abnormal tangle of vessels.  Finding raises the possibility of an underlying vascular malformation.  Neuro surgical   consultation has already been placed at this time.  Further assessment with diagnostic interventional angiography and/or contrast enhanced MRI brain would be helpful.    Local mass effect from left frontal intraparenchymal hematoma as well as left to right 5 millimeter midline shift again noted.        Patient/Family Goals: To speak clearly and express needs and ideas.     Interdisciplinary Communication: Disussed with other staff    Patient and  family has been informed and has taken part in this evaluation and plan of treatment and have been advised and agree on the findings and goals.      FOLLOW UP  Treatment Plan/Recommendations: Cognitive communication therapy  Number of Visits to Meet Established Goals: 4  Follow Up Needed (Documentation Required): Yes  SLP Follow-up Date: 03/29/24    Thank you for your referral.  If you have any questions please contact TERE Rodrigez        Electronically signed by Sarah Mena SLP on 3/28/2024  4:07 PM           Immunizations     Name Date      Covid-19  Moderna Bivalent 12+ years 10/17/22     Covid-19 Pfizer 04/16/22     Covid-19 Pfizer 09/27/21     Covid-19 Pfizer 02/15/21     Covid-19 Pfizer 01/25/21       Future Appointments        Provider Department Center    4/5/2024 11:45 AM Carmina Banks APRN [facility] [place] [facility]    4/29/2024 10:15 AM Carmina Banks APRN [facility] [place] [facility]    6/25/2024 11:30 AM Carl Portillo MD [facility]      Multidisciplinary Problems     Active Goals     Not on file          Resolved Goals        Problem: Patient/Family Goals    Goal Priority Disciplines Outcome Interventions   Patient/Family Long Term Goal   (Resolved)     Interdisciplinary Adequate for Discharge    Description: Patient's Long Term Goal: Return home with  at near baseline ADLs    Interventions:  - follow plan of cares, scheduled medications per neurology, assessments per protocol, PT/OT when patient more alert  - See additional Care Plan goals for specific interventions   Patient/Family Short Term Goal   (Resolved)     Interdisciplinary Adequate for Discharge    Description: Patient's Short Term Goal: Maintain SBP within parameters set per neurosurgery    Interventions:   - PRN medications, BP infusions, pain management, sedation management while intubated  - See additional Care Plan goals for specific interventions

## (undated) NOTE — IP AVS SNAPSHOT
Patient Demographics     Address  [address]  [facility] [address] Phone  [phone] (Home)  [phone] (Mobile) *Preferred* E-mail Address  [email or web address]      Patient Contacts     Name Relation Home Work Mobile    Hosea Ching Spouse   [phone]      Allergies as of 3/20/2024  Review status set to Review Complete on 3/20/2024   No Known Allergies     Code Status Information     Code Status    Full Code        Patient Instructions       Plan transfer to [facility] for neurointerventional evaluation  Further recommendations per MD at [facility]     Follow-up Information     Carl Portillo MD Follow up in 1 month(s).    Specialty: NEUROLOGY  Why: Neuro/Stroke follow up  Contact information:  [address] [place] [address]  [facility] [address] [phone]                        Your Home Meds List      TAKE these medications       Instructions Authorizing Provider Morning Afternoon Evening As Needed   acetaminophen 325 MG Tabs  Commonly known as: Tylenol      Take 2 tablets (650 mg total) by mouth every 4 (four) hours as needed.   GAGE DEAL   [    ]   [    ]   [    ]   [    ]     amLODIPine 5 MG Tabs  Commonly known as: Norvasc      Take 1 tablet (5 mg total) by mouth daily.   GAGE DEAL   [    ]   [    ]   [    ]   [    ]     bisacodyl 10 MG Supp  Commonly known as: Dulcolax      Place 1 suppository (10 mg total) rectally daily as needed.   GAGE DEAL   [    ]   [    ]   [    ]   [    ]     niCARdipine in sodium chloride 0.86% 20 mg/200mL Soln  Commonly known as: carDENE      Inject 5-15 mg/hr into the vein continuous.   GAGE DEAL   [    ]   [    ]   [    ]   [    ]     ondansetron 4 MG/2ML Soln  Commonly known as: Zofran      Inject 2 mL (4 mg total) into the vein every 6 (six) hours as needed.   GAGE DEAL   [    ]   [    ]   [    ]   [    ]     Polyethylene Glycol 3350 17 g Pack  Commonly known as: MIRALAX      Take 17 g by mouth daily as needed (If no bowel  movement in last 24 hours).   GAGE DEAL   [    ]   [    ]   [    ]   [    ]     Sennosides 17.2 MG Tabs      Take 1 tablet (17.2 mg total) by mouth nightly as needed (constipation, as needed if no bowel movement that day).   GAGE DEAL   [    ]   [    ]   [    ]   [    ]     simvastatin 10 MG Tabs  Commonly known as: Zocor      Take 1 tablet (10 mg total) by mouth nightly.    [    ]   [    ]   [    ]   [    ]     sodium chloride 0.9%      Inject into the vein continuous.   GAGE DEAL   [    ]   [    ]   [    ]   [    ]     sodium chloride 0.9% PF 0.9% SOLN 10 mL with pantoprazole 40 MG SOLR 40 mg  Start taking on: March 21, 2024      Inject 40 mg into the vein daily.   GAGE DEAL   [    ]   [    ]   [    ]   [    ]              [id number] - MAR ACTION REPORT  (last 48 hrs)    ** SITE UNKNOWN **     Order ID Medication Name Action Time Action Reason Comments    [id number] iopamidol (ISOVUE-370) 76 % injection 50 mL 03/20/24 0004 Given      [id number] labetalol (Trandate) 5 mg/mL injection 10 mg 03/20/24 0021 Given      [id number] labetalol (Trandate) 5 mg/mL injection 20 mg 03/20/24 0009 Given      [id number] niCARdipine in sodium chloride 0.86% (carDENE) 20 mg/200mL infusion premix 03/20/24 0033 New Bag  Per MD    [id number] pantoprazole (Protonix) 40 mg in sodium chloride 0.9% PF 10 mL IV push 03/20/24 0922 Given              Recent Vital Signs    Flowsheet Row Most Recent Value   /88 Filed at 03/20/2024 1800   Pulse 78 Filed at 03/20/2024 1800   Resp 16 Filed at 03/20/2024 1800   Temp 98.6 °F (37 °C) Filed at 03/20/2024 1600   SpO2 96 % Filed at 03/20/2024 1800      Patient's Most Recent Weight    Flowsheet Row Most Recent Value   Patient Weight 57.4 kg (126 lb 8.7 oz)         Lab Results Last 24 Hours      Hemoglobin A1C [[id number]] (Abnormal)  Resulted: 03/20/24 0718, Result status: Final result   Ordering provider: Brooke Moreno MD  03/20/24 0457 Resulting lab: [facility]  ([facility])    Specimen Information    Type Source Collected On   Blood — 03/19/24 2354          Components    Component Value Reference Range Flag Lab   HgbA1C 6.0 <5.7 % H [facility] ([facility])   Comment:         Normal HbA1C:     <5.7%      Pre-Diabetic:     5.7 - 6.4%      Diabetic:         >6.4%      Diabetic Control: <7.0%       Estimated Average Glucose 126 68 - 126 mg/dL — [facility] ([facility])   Comment:        eAG is the estimated average glucose calculated from Hgb A1c according to the formula recommended by the American Diabetes Association. eAG levels reflect the long term average glucose and may not correlate with random or fasting glucose levels since these represent specific points in time.                   RAINBOW DRAW LAVENDER [[id number]]  Resulted: 03/20/24 0101, Result status: Final result   Ordering provider: Riley Angeles MD  03/19/24 2354 Resulting lab: [facility] ([facility])    Specimen Information    Type Source Collected On   Blood — 03/19/24 2354          Components    Component Value Reference Range Flag Lab   Hold Lavender Auto Resulted — — [facility])            RAINBOW DRAW LIGHT GREEN [[id number]]  Resulted: 03/20/24 0101, Result status: Final result   Ordering provider: Riley Angeles MD  03/19/24 2354 Resulting lab: [facility] ([facility])    Specimen Information    Type Source Collected On   Blood — 03/19/24 2354          Components    Component Value Reference Range Flag Lab   Hold Lt Green Auto Resulted — — [facility])            RAINBOW DRAW BLUE [[id number]]  Resulted: 03/20/24 0101, Result status: Final result   Ordering provider: Riley Angeles MD  03/19/24 2354 Resulting lab: [facility] ([facility])    Specimen Information    Type Source Collected On   Blood — 03/19/24 2354          Components    Component Value Reference Range Flag Lab   Hold Blue Auto Resulted — —  [facility])            Basic Metabolic Panel (8) [[id number]] (Abnormal)  Resulted: 03/20/24 0021, Result status: Final result   Ordering provider: Riley Angeles MD  03/19/24 2352 Resulting lab: [facility] ([facility])    Specimen Information    Type Source Collected On   Blood — 03/19/24 2354          Components    Component Value Reference Range Flag Lab   Glucose 184 70 - 99 mg/dL H [facility] ([facility])   Sodium 141 136 - 145 mmol/L — [facility])   Potassium 4.0 3.5 - 5.1 mmol/L — [facility])   Chloride 108 98 - 112 mmol/L — [facility])   CO2 26.0 21.0 - 32.0 mmol/L — [facility])   Anion Gap 7 0 - 18 mmol/L — [facility])   BUN 14 9 - 23 mg/dL — [facility])   Creatinine 0.82 0.55 - 1.02 mg/dL — [facility])   BUN/CREA Ratio 17.1 10.0 - 20.0 — [facility])   Calcium, Total 10.2 8.7 - 10.4 mg/dL — [facility])   Calculated Osmolality 297 275 - 295 mOsm/kg H [facility] ([facility])   eGFR-Cr 79 >=60 mL/min/1.73m2 — [facility])            Hepatic Function Panel (7) [[id number]] (Abnormal)  Resulted: 03/20/24 0021, Result status: Final result   Ordering provider: Riley Angeles MD  03/19/24 2229 Resulting lab: [facility] ([facility])    Specimen Information    Type Source Collected On   Blood — 03/19/24 2354          Components    Component Value Reference Range Flag Lab   AST 25 <=34 U/L — [facility])   ALT 15 10 - 49 U/L — [facility])   Alkaline Phosphatase 66 50 - 130 U/L — [facility])   Bilirubin, Total 0.5 0.2 - 1.1 mg/dL — [facility] ([facility])   Bilirubin, Direct <0.1 <=0.3 mg/dL — [facility] ([facility])   Total Protein 7.9 5.7 - 8.2 g/dL — [facility] ([facility])   Albumin 4.9 3.2 - 4.8 g/dL H [facility] ([facility])            Prothrombin Time (PT) [[id number]] (Normal)  Resulted: 03/20/24 0012, Result status: Final result   Ordering provider: Riley Angeles MD  03/19/24 1001 Resulting  lab: [facility] ([facility])    Specimen Information    Type Source Collected On   Blood — 03/19/24 2354          Components    Component Value Reference Range Flag Lab   PT 12.5 11.6 - 14.8 seconds — [facility] ([facility])   INR 0.89 0.80 - 1.20 — [facility] ([facility])   Comment:        Only the INR (not the PT value) should be utilized for   the monitoring of oral anticoagulant therapy.     Recommended therapeutic ranges for anticoagulant therapy are   as follows:   2.0 - 3.0 All indications except for mechanical prosthetic   cardiac valves.     2.5 - 3.5 Mechanical prosthetic cardiac valves.              PTT, Activated [[id number]] (Normal)  Resulted: 03/20/24 0012, Result status: Final result   Ordering provider: Riley Angeles MD  03/19/24 2355 Resulting lab: [facility] ([facility])    Specimen Information    Type Source Collected On   Blood — 03/19/24 2354          Components    Component Value Reference Range Flag Lab   PTT 26.5 23.3 - 35.6 seconds — [facility] ([facility])            CBC With Differential With Platelet [[id number]] (Abnormal)  Resulted: 03/20/24 0005, Result status: Final result   Ordering provider: Riley Angeles MD  03/19/24 2355 Resulting lab: [facility] ([facility])   Narrative:  The following orders were created for panel order CBC With Differential With Platelet.  Procedure                               Abnormality         Status                     ---------                               -----------         ------                     CBC W/ DIFFERENTIAL[[id number]]          Abnormal            Final result                 Please view results for these tests on the individual orders.    Specimen Information    Type Source Collected On   Blood — 03/19/24 2354            Testing Performed By     Lab - Abbreviation Name Director Address Valid Date Range    162 - [facility] ([facility]) [facility] ([facility]  Kameron Mello M.D. [address]  [facility] [address] 20 1442 - Present            Microbiology Results (All)     None         H&P - H&P Note      H&P signed by Brooke Moreno MD at 3/20/2024  6:19 AM  Version 1 of 1    Author: Brooke Moreno MD Service: Internal Medicine Author Type: Physician    Filed: 3/20/2024  6:19 AM Date of Service: 3/20/2024 12:49 AM Status: Signed    : Brooke Moreno MD (Physician)       [facility]  part of [facility]    History & Physical    Leanne Ching Patient Status:  Emergency    1960 MRN [id number]   Location [facility] EMERGENCY DEPARTMENT Attending Riley Angeles MD   Hosp Day # 0 PCP Cristiano Vega MD     Date:  3/20/2024  Date of Admission:  3/19/2024    Chief Complaint:  No chief complaint on file.      History of Present Illness:  Leanne Ching is a(n) 63 year old female, who presents for evaluation of sudden onset slurred speech and right arm weakness. PMHx significant for hyperlipidemia.  Patient accompanied by  at bedside.  History obtained by  at bedside as well as EMR.  Around 10:30 PM patient started having symptoms of difficulty speaking as well as sudden onset of right arm weakness.  She was noted to have slurred speech and did not feel right.  No history of elevated blood pressure.  No previous episodes of similar symptoms.  Per , patient is otherwise healthy.  She denies being on any antiplatelet or anticoagulation medications.  On my evaluation, patient with noticeable slurred speech and unable to move her right arm.  She is able to move all other extremities.  Alert and oriented to self, time, location.  On presentation to the ED, stroke alert was called.  CT head revealed 3.7 x 3.7 x 5.6 cm left frontal intraparenchymal hematoma with surrounding vasogenic edema resulting in mild mass effect with approximately 0.3 cm rightward midline shift.  TNK was not given.  Patient  was also noted to have elevated blood pressure of /106.  Remaining vital signs within normal limits.  Patient was treated with labetalol initially 10 mg IV x 1 followed by 20 mg with improvement of blood pressure.  She was started on nicardipine drip.  She was admitted to intensive care unit for closer observation with consultation to neurology/neuro surgery/critical care.    History:  Past Medical History:   Diagnosis Date    Diverticulosis large intestine w/o perforation or abscess w/o bleeding 2018    High cholesterol     Hyperlipidemia     Internal hemorrhoids 2018     Past Surgical History:   Procedure Laterality Date    ABLATION      COLONOSCOPY N/A 2018    Procedure: COLONOSCOPY;  Surgeon: Jacinda Cruz MD;  Location: [facility] ENDOSCOPY          x 2     Family History   Problem Relation Age of Onset    Cancer Paternal Grandmother 70        UTERINE      reports that she has never smoked. She has never used smokeless tobacco. She reports that she does not drink alcohol and does not use drugs.    Allergies:  No Known Allergies    Home Medications:  Prior to Admission Medications   Prescriptions Last Dose Informant Patient Reported? Taking?   DHA-EPA-Vitamin E (OMEGA-3 COMPLEX OR)   Yes No   Sig: Take by mouth.   Multiple Vitamins-Minerals (MULTI-VITAMIN/MINERALS) Oral Tab   Yes No   Sig: Take 1 tablet by mouth daily.   PATIENT SUPPLIED MEDICATION   Yes No   PEG 3350-KCl-NaBcb-NaCl-NaSulf (COLYTE WITH FLAVOR PACKS) 240 g Oral Recon Soln   No No   Sig: As directed per GI consult notes   Rosuvastatin Calcium 10 MG Oral Tab   Yes No   Sig: Take 10 mg by mouth nightly.   simvastatin 10 MG Oral Tab   Yes No   Sig: Take 10 mg by mouth nightly.      Facility-Administered Medications: None       Review of Systems:  Constitutional: Negative  Eye:  Negative.  Ear/Nose/Mouth/Throat:  Negative.  Respiratory:  Negative  Cardiovascular: Negative  Gastrointestinal:   Negative.  Genitourinary:  Negative  Endocrine:  Negative.  Immunologic:  Negative.  Musculoskeletal:  Negative.  Integumentary:  Negative.  Neurologic:  + Slurred speech/right arm weakness  Psychiatric:  Negative.  ROS reviewed as documented in chart    Physical Exam:  Pulse:  [] 93  Resp:  [15-22] 15  BP: (138-192)/() 154/95  SpO2:  [96 %-99 %] 98 %    General:  Alert and oriented.  Diffuse skin problem:  None.  Eye:  Pupils are equal, round and reactive to light, extraocular movements are intact, Normal conjunctiva.  HENT:  Normocephalic, oral mucosa is moist.  Head:  Normocephalic, atraumatic.  Neck:  Supple, non-tender, no carotid bruit, no jugular venous distention, no lymphadenopathy, no thyromegaly.  Respiratory:  Lungs are clear to auscultation, respirations are non-labored, breath sounds are equal, symmetrical chest wall expansion.  Cardiovascular:  Normal rate, regular rhythm, no murmur, no edema.  Gastrointestinal:  Soft, non-tender, non-distended, normal bowel sounds, no organomegaly.  Lymphatics:  No lymphadenopathy neck, axilla, groin.  Musculoskeletal: Normal range of motion.  normal strength.  Feet:  Normal pulses.  Neurologic: Slurred speech, right upper extremity weakness.   Cognition and Speech:  Oriented, speech clear and coherent.  Psychiatric:  Cooperative, appropriate mood & affect.      Laboratory Data:   Lab Results   Component Value Date    WBC 9.2 03/19/2024    HGB 13.0 03/19/2024    HCT 41.3 03/19/2024    .0 03/19/2024    CREATSERUM 0.82 03/19/2024    BUN 14 03/19/2024     03/19/2024    K 4.0 03/19/2024     03/19/2024    CO2 26.0 03/19/2024     03/19/2024    CA 10.2 03/19/2024    ALB 4.9 03/19/2024    ALKPHO 66 03/19/2024    BILT 0.5 03/19/2024    TP 7.9 03/19/2024    AST 25 03/19/2024    ALT 15 03/19/2024    PTT 26.5 03/19/2024    INR 0.89 03/19/2024       Imaging:  No results found.     Assessment and Plan:    Right arm weakness  Slurred  speech  Left frontal intraparenchymal hematoma w/ surrounding vasogenic edema  -Admitted to ICU for closer observation.  Patient is at high risk for decompensation.  Noted to have significantly elevated blood pressure on presentation with SBP > 190 which could be a cause of intraparenchymal bleed.   -Neurochecks every 2 hours  -Repeat CT head in the a.m. to reevaluate hemorrhage  -Neurology on consult (Dr. Portillo notified)  -Neurosurgery on consult (Dr. Wade)  -Pulmonology/critical care on consult (Dr. Russo notified)  -No antiplatelets/anticoagulation   -Maintain n.p.o.  -PT/OT/Speech therapy consulted   -SCDs for DVT prophylaxis    Elevated blood pressure  -No history of essential hypertension but noted to have SBP above 190.  -Received labetalol x 2 with mild improvement of blood pressure  -Will continue nicardipine drip to maintain SBP less than 160.     H/o hyperlipidemia    Prophylaxis  SCDs, intracranial bleed    CODE STATUS  Full    Primary care physician  Cristiano Vega MD    60 minutes spent on this admission - examining patient, obtaining history, reviewing previous medical records, going over test results/imaging and discussing plan of care. All questions answered.     Disposition  Clinical course will dictate outcome      Brooke Moreno MD  3/20/2024  12:49 AM       Electronically signed by Brooke Moreno MD on 3/20/2024  6:19 AM              Consults - MD Consult Notes      Consults signed by Misael Candelario DO at 3/20/2024 12:12 PM     Author: Misael Candelario DO Service: Pulmonology Author Type: Physician    Filed: 3/20/2024 12:12 PM Date of Service: 3/20/2024 12:05 PM Status: Signed    : Misael Candelario DO (Physician)     Consult Orders    1. Consult to Pulmonology [[id number]] ordered by Riley Angeles MD at 24 0024             [facility]  part of [facility]    Report of Consultation    Leanne Ching Patient Status:  Inpatient     1960 MRN [id number]   Location [facility] 2W/SW Attending Yogesh Berry MD   Hosp Day # 0 PCP Shelby Frost MD     Date of Admission:  3/19/2024    Reason for Consultation:   Intracranial hemorrhage    History of Present Illness:   Patient is a 63-year-old female with underlying history of hyperlipidemia who presents with chief complaint of expressive aphasia and some associated right-sided weakness noticed earlier this morning.   states that they were planning on traveling to [place] today and patient found to have expressive aphasia prompting him to come to the hospital.  Right upper and lower extremity weakness also noted.  Denies any fall.  Denies prior history of CVA or intracranial bleeding.  Alert, awake otherwise.  Denies history of known hypertension or use of anticoagulation therapy.    Past Medical History  Past Medical History:   Diagnosis Date    Diverticulosis large intestine w/o perforation or abscess w/o bleeding 2018    High cholesterol     Hyperlipidemia     Internal hemorrhoids 2018       Past Surgical History  Past Surgical History:   Procedure Laterality Date    ABLATION      COLONOSCOPY N/A 2018    Procedure: COLONOSCOPY;  Surgeon: Jacinda Cruz MD;  Location: [facility] ENDOSCOPY          x 2       Family History  Family History   Problem Relation Age of Onset    Cancer Paternal Grandmother 70        UTERINE       Social History  Social History     Socioeconomic History    Marital status:    Tobacco Use    Smoking status: Never    Smokeless tobacco: Never   Substance and Sexual Activity    Alcohol use: No    Drug use: No           Current Medications:  Current Facility-Administered Medications   Medication Dose Route Frequency    niCARdipine in sodium chloride 0.86% (carDENE) 20 mg/200mL infusion premix  5-15 mg/hr Intravenous Continuous    sodium chloride 0.9% infusion   Intravenous Continuous    acetaminophen (Tylenol) tab  650 mg  650 mg Oral Q4H PRN    Or    acetaminophen (Tylenol) rectal suppository 650 mg  650 mg Rectal Q4H PRN    labetalol (Trandate) 5 mg/mL injection 10 mg  10 mg Intravenous Q10 Min PRN    hydrALAzine (Apresoline) 20 mg/mL injection 10 mg  10 mg Intravenous Q2H PRN    ondansetron (Zofran) 4 MG/2ML injection 4 mg  4 mg Intravenous Q6H PRN    Or    metoclopramide (Reglan) 5 mg/mL injection 10 mg  10 mg Intravenous Q8H PRN    polyethylene glycol (PEG 3350) (Miralax) 17 g oral packet 17 g  17 g Oral Daily PRN    sennosides (Senokot) tab 17.2 mg  17.2 mg Oral Nightly PRN    bisacodyl (Dulcolax) 10 MG rectal suppository 10 mg  10 mg Rectal Daily PRN    fleet enema (Fleet) 7-19 GM/118ML rectal enema 133 mL  1 enema Rectal Once PRN    midazolam (Versed) 2 MG/2ML injection 2 mg  2 mg Intravenous Q15 Min PRN    pantoprazole (Protonix) 40 mg in sodium chloride 0.9% PF 10 mL IV push  40 mg Intravenous Daily    amLODIPine (Norvasc) tab 5 mg  5 mg Oral Daily    pravastatin (Pravachol) tab 20 mg  20 mg Oral Nightly     Medications Prior to Admission   Medication Sig    simvastatin 10 MG Oral Tab Take 1 tablet (10 mg total) by mouth nightly.    PEG 3350-KCl-NaBcb-NaCl-NaSulf (COLYTE WITH FLAVOR PACKS) 240 g Oral Recon Soln As directed per GI consult notes       Allergies  No Known Allergies    Review of Systems:   Constitutional: denies fevers, chills, weakness, fatigue, recent illness  HEENT: denies headache, sore throat, vision loss  Cardio: denies chest pain, chest pressure, palpitations  Respiratory: denies dyspnea, cough, wheezing, hemoptysis   GI: denies nausea, vomiting, abdominal pain  : denies dysuria, hematuria  Musculoskeletal: denies arthralgia, myalgia  Integumentary: denies rash, itching  Neurological: Expressive aphasia, right-sided motor deficit  Psychiatric: denies depression, anxiety  Hematologic: denies bruising        Physical Exam:   Blood pressure 136/77, pulse 103, temperature 98.2 °F (36.8 °C),  temperature source Temporal, resp. rate 16, height 5' 7\" (1.702 m), weight 126 lb 8.7 oz (57.4 kg), SpO2 98%.    Constitutional: no acute distress  Eyes: PERRL  ENT: nares patent  Neck: neck supple, no JVD  Cardio: RRR, S1 S2  Respiratory: clear to auscultation bilaterally, no wheezing, rales, rhonchi, crackles  GI: abdomen soft, non tender, active bowel souds, no organomegaly  Extremities: no clubbing, cyanosis, edema  Neurologic: Aggressive aphasia with right upper and lower extremity weakness noted  Skin: warm, dry    Results:   Laboratory Data  Lab Results   Component Value Date    WBC 9.2 03/19/2024    HGB 13.0 03/19/2024    HCT 41.3 03/19/2024    .0 03/19/2024    CREATSERUM 0.82 03/19/2024    BUN 14 03/19/2024     03/19/2024    K 4.0 03/19/2024     03/19/2024    CO2 26.0 03/19/2024     (H) 03/19/2024    CA 10.2 03/19/2024    ALB 4.9 (H) 03/19/2024    ALKPHO 66 03/19/2024    TP 7.9 03/19/2024    AST 25 03/19/2024    ALT 15 03/19/2024    PTT 26.5 03/19/2024    INR 0.89 03/19/2024    PTP 12.5 03/19/2024         Imaging  CT STROKE CTA BRAIN/CTA NECK (W IV)(CPT=70496/65211)    Result Date: 3/20/2024  CONCLUSION:   Large left frontal intraparenchymal hematoma again noted.  There is associated internal serpiginous enhancement, which may represent an abnormal tangle of vessels.  Finding raises the possibility of an underlying vascular malformation.  Neuro surgical consultation has already been placed at this time.  Further assessment with diagnostic interventional angiography and/or contrast enhanced MRI brain would be helpful.  Local mass effect from left frontal intraparenchymal hematoma as well as left to right 5 millimeter midline shift again noted.  No flow-limiting stenosis within the major arteries of the neck.  Approximately 36% narrowing of the left carotid bulb by atherosclerotic plaque.  No significant narrowing of the right carotid bulb.  No high-grade flow-limiting stenosis  within the major arteries of the [place] intracranially.     Dictated by (CST): Giselle Alexander MD on 3/20/2024 at 8:05 AM     Finalized by (CST): Giselle Alexander MD on 3/20/2024 at 8:22 AM          CT BRAIN OR HEAD (37922)    Result Date: 3/20/2024  CONCLUSION:  1. Large acute 4.5 x 3.5 cm intraparenchymal hematoma centered at the periphery of the left frontal lobe.  Although the hematoma largely appears similar in size and morphology since examination from approximately 6.5 hours prior, there is a small curvilinear band of acute hemorrhagic components along the posterior margin of the hematoma, which are new since prior.  Mild surrounding vasogenic edema appears unchanged.  Stable left cerebral hemispheric mass effect with mild 2-3 mm left-to-right midline shift.  Continued close follow-up to resolution is advised. 2. Intracranial atherosclerosis.   Dictated by (CST): Finesse Martinez MD on 3/20/2024 at 7:54 AM     Finalized by (CST): Finesse Martinez MD on 3/20/2024 at 7:59 AM          XR CHEST AP PORTABLE  (CPT=71045)    Result Date: 3/20/2024  CONCLUSION:   No radiographic evidence of acute cardiopulmonary process.  A preliminary report was issued by the [facility] Radiology teleradiology service. There are no clinically actionable discrepancies.    Dictated by (CST): Giselle Alexander MD on 3/20/2024 at 7:46 AM     Finalized by (CST): Giselle Alexander MD on 3/20/2024 at 7:46 AM          CT STROKE BRAIN (NO IV)(CPT=70450)    Result Date: 3/20/2024  CONCLUSION:   Large 4.8 centimeter left frontal intraparenchymal hematoma with moderate local mass effect, as well as 3 millimeters left-to-right midline shift.  Neurosurgical consultation is recommended.  Follow-up CT brain is recommended.  Differential considerations include underlying vascular etiologies such as AVM, a hemorrhagic mass, or other etiologies.   This report was called immediately at [facility] time on 03/20/2024 hours to Dr. Angeles by   Bryant Reynoso   A preliminary report was issued by the [facility] teleradiology service. There are no clinically actionable discrepancies.  Dictated by (CST): Giselle Alexander MD on 3/20/2024 at 7:16 AM     Finalized by (CST): Giselle Alexander MD on 3/20/2024 at 7:23 AM           Assessment   1.  Left intracranial hemorrhage  2.  Expressive aphasia  3.  Hypertension  4.  Hyperlipidemia    Plan   -Patient presents with evidence of expressive aphasia and right-sided motor deficit noted.  Found to have evidence of left frontal intraparenchymal hemorrhage noted on CT head on presentation.  -Repeat CT head on 3/20/2024 with 4.5 cm intraparenchymal hematoma within the frontal lobe.  Mild surrounding vasogenic edema seen with 2 to 3 mm mass effect seen with midline shift.  -CTA brain raising suspicion for possible vascular malformation.  Await further neurosurgery recommendations to see if angiogram required to assess for potential brain lesion.  -Close neuromonitoring  -Patient denies history of hypertension.  As needed antihypertensives ordered  -Further recommendations per neurosurgery  -Hold all anticoagulation at this time  -Discussed with family at bedside  -Reviewed vitals, labs and imaging    Misael Candelario DO  Pulmonary Critical Care Medicine  [facility]  3/20/2024  12:06 PM     Electronically signed by Misael Candelario DO on 3/20/2024 12:12 PM              Discharge Summary - D/C Summary      Discharge Summary signed by Yogesh Berry MD at 3/20/2024  4:03 PM  Version 2 of 2    Author: Yogesh Berry MD Service: Hospitalist Author Type: Physician    Filed: 3/20/2024  4:03 PM Date of Service: 3/20/2024  3:52 PM Status: Addendum    : Ygoesh Berry MD (Physician)    Related Notes: Original Note by Yogesh Berry MD (Physician) filed at 3/20/2024  3:59 PM       [facility]  part of [facility]    Discharge Summary    Leanne Ching Patient Status:  Inpatient     6/26/1960 N [id number]   Location [facility] 2W/SW Attending Yogesh Berry MD   Hosp Day # 0 PCP Shelby Frost MD     Date of Admission: 3/19/2024 Disposition: [facility]     Date of Discharge: 03/20/24      Admitting Diagnosis: Hypertensive emergency [I16.1]  Intraparenchymal hematoma of brain, left, without loss of consciousness, initial encounter (HCC) [S06.320A]    Hospital Discharge Diagnoses:  Lf. frontal Intraparenchymal hematoma with surrounding vasogenic edema    Lace+ Score: 40  59-90 High Risk  29-58 Medium Risk  0-28   Low Risk.    TCM Follow-Up Recommendation:  LACE 29-58: Moderate Risk of readmission after discharge from the hospital.      Problem List:   Patient Active Problem List   Diagnosis    Diverticulosis large intestine w/o perforation or abscess w/o bleeding    Internal hemorrhoids    Intraparenchymal hematoma of brain, left, without loss of consciousness, initial encounter (HCC)    Hypertensive emergency       Reason for Admission:   Lf. frontal Intraparenchymal hematoma with surrounding vasogenic edema  Accelerated Hypertension      Physical Exam:   General appearance: alert, appears stated age and cooperative  Pulmonary:  clear to auscultation bilaterally  Cardiovascular: S1, S2 normal, no murmur, click, rub or gallop, regular rate and rhythm  Abdominal: soft, non-tender; bowel sounds normal; no masses,  no organomegaly  Extremities: extremities normal, atraumatic, no cyanosis or edema  Psychiatric: calm  Neuro- + rt. Sided weakness, rt. Facial droop, expressive aphasia.       History of Present Illness:   Per Dr. Josh Perdomo Jeane is a(n) 63 year old female, who presents for evaluation of sudden onset slurred speech and right arm weakness. PMHx significant for hyperlipidemia.  Patient accompanied by  at bedside.  History obtained by  at bedside as well as EMR.  Around 10:30 PM patient started having symptoms of difficulty speaking as well as sudden  onset of right arm weakness.  She was noted to have slurred speech and did not feel right.  No history of elevated blood pressure.  No previous episodes of similar symptoms.  Per , patient is otherwise healthy.  She denies being on any antiplatelet or anticoagulation medications.  On my evaluation, patient with noticeable slurred speech and unable to move her right arm.  She is able to move all other extremities.  Alert and oriented to self, time, location.  On presentation to the ED, stroke alert was called.  CT head revealed 3.7 x 3.7 x 5.6 cm left frontal intraparenchymal hematoma with surrounding vasogenic edema resulting in mild mass effect with approximately 0.3 cm rightward midline shift.  TNK was not given.  Patient was also noted to have elevated blood pressure of /106.  Remaining vital signs within normal limits.  Patient was treated with labetalol initially 10 mg IV x 1 followed by 20 mg with improvement of blood pressure.  She was started on nicardipine drip.  She was admitted to intensive care unit for closer observation with consultation to neurology/neuro surgery/critical care.     Hospital Course:   Brief Hospital course, Ms. Ching was admitted for right arm weakness, slurred speech found to have a left frontal intraparenchymal hematoma with surrounding vasogenic edema.  She was admitted to the ICU she had significantly elevated blood pressure requiring nicardipine drip.  Was seen by neurosurgery, neurology, pulmonary critical care.  CT scan and CTA reviewed discussed with Dr. Wade from neurosurgery.  There is left frontal intraparenchymal hemorrhage with extension to or just beneath the surface, there is a small spot sign within this hemorrhage on the CT angiogram.  There is no clear evidence of a vascular malformation.  Dr. Wade recommends transfer to [facility] for a diagnostic angiogram and decision to be made for benefit of surgical evacuation in this case pending  angiogram.    Family agreeable to transfer for diagnostic angiogram and neuro intensive care at [facility].    Called hospitalist to Mike with signout.    Consultations:   Neurology  Neurosurgery  Pulmonary critical care    Procedures: n/a    Complications: n/a    Discharge Condition: Fair    Discharge Medications:      Discharge Medications        START taking these medications        Instructions Prescription details   acetaminophen 325 MG Tabs  Commonly known as: Tylenol      Take 2 tablets (650 mg total) by mouth every 4 (four) hours as needed.   Refills: 0     amLODIPine 5 MG Tabs  Commonly known as: Norvasc      Take 1 tablet (5 mg total) by mouth daily.   Refills: 0     bisacodyl 10 MG Supp  Commonly known as: Dulcolax      Place 1 suppository (10 mg total) rectally daily as needed.   Refills: 0     niCARdipine in sodium chloride 0.86% 20 mg/200mL Soln  Commonly known as: carDENE      Inject 5-15 mg/hr into the vein continuous.   Refills: 0     ondansetron 4 MG/2ML Soln  Commonly known as: Zofran      Inject 2 mL (4 mg total) into the vein every 6 (six) hours as needed.   Refills: 0     Polyethylene Glycol 3350 17 g Pack  Commonly known as: MIRALAX      Take 17 g by mouth daily as needed (If no bowel movement in last 24 hours).   Refills: 0     Sennosides 17.2 MG Tabs      Take 1 tablet (17.2 mg total) by mouth nightly as needed (constipation, as needed if no bowel movement that day).   Refills: 0     sodium chloride 0.9%      Inject into the vein continuous.   Refills: 0     sodium chloride 0.9% PF 0.9% SOLN 10 mL with pantoprazole 40 MG SOLR 40 mg  Start taking on: March 21, 2024      Inject 40 mg into the vein daily.   Refills: 0            CONTINUE taking these medications        Instructions Prescription details   simvastatin 10 MG Tabs  Commonly known as: Zocor      Take 1 tablet (10 mg total) by mouth nightly.   Refills: 0            STOP taking these medications      polyethylene glycol (PEG  3350-KCl-NaBcb-NaCl-NaSulf) 240 g Solr  Commonly known as: Colyte with Flavor Packs                     Other Discharge Instructions: per Neuro intensive care    GAGE DEAL MD  3/20/2024  3:52 PM    > 35 min     Electronically signed by Gage Deal MD on 3/20/2024  4:03 PM     Discharge Summary signed by Gage Deal MD at 3/20/2024  3:59 PM  Version 1 of 2    Author: Gage Deal MD Service: Hospitalist Author Type: Physician    Filed: 3/20/2024  3:59 PM Date of Service: 3/20/2024  3:52 PM Status: Signed    : Gage Deal MD (Physician)    Related Notes: Addendum by Gage Deal MD (Physician) filed at 3/20/2024  4:03 PM       [facility]  part of [facility]    Discharge Summary    Leanne Ching Patient Status:  Inpatient    1960 MRN [id number]   Location [facility] 2W/SW Attending Gage Deal MD   Hosp Day # 0 PCP Shelby Frost MD     Date of Admission: 3/19/2024 Disposition: [facility]     Date of Discharge: 24      Admitting Diagnosis: Hypertensive emergency [I16.1]  Intraparenchymal hematoma of brain, left, without loss of consciousness, initial encounter (HCC) [S06.320A]    Hospital Discharge Diagnoses:  Lf. frontal Intraparenchymal hematoma with surrounding vasogenic edema    Lace+ Score: 40  59-90 High Risk  29-58 Medium Risk  0-28   Low Risk.    TCM Follow-Up Recommendation:  LACE 29-58: Moderate Risk of readmission after discharge from the hospital.      Problem List:   Patient Active Problem List   Diagnosis    Diverticulosis large intestine w/o perforation or abscess w/o bleeding    Internal hemorrhoids    Intraparenchymal hematoma of brain, left, without loss of consciousness, initial encounter (HCC)    Hypertensive emergency       Reason for Admission:   Lf. frontal Intraparenchymal hematoma with surrounding vasogenic edema  Hypertensive Emergency      Physical Exam:   General appearance: alert, appears stated age and  cooperative  Pulmonary:  clear to auscultation bilaterally  Cardiovascular: S1, S2 normal, no murmur, click, rub or gallop, regular rate and rhythm  Abdominal: soft, non-tender; bowel sounds normal; no masses,  no organomegaly  Extremities: extremities normal, atraumatic, no cyanosis or edema  Psychiatric: calm  Neuro- + rt. Sided weakness, rt. Facial droop, expressive aphasia.       History of Present Illness:   Per . Josh  Leanne Ching is a(n) 63 year old female, who presents for evaluation of sudden onset slurred speech and right arm weakness. PMHx significant for hyperlipidemia.  Patient accompanied by  at bedside.  History obtained by  at bedside as well as EMR.  Around 10:30 PM patient started having symptoms of difficulty speaking as well as sudden onset of right arm weakness.  She was noted to have slurred speech and did not feel right.  No history of elevated blood pressure.  No previous episodes of similar symptoms.  Per , patient is otherwise healthy.  She denies being on any antiplatelet or anticoagulation medications.  On my evaluation, patient with noticeable slurred speech and unable to move her right arm.  She is able to move all other extremities.  Alert and oriented to self, time, location.  On presentation to the ED, stroke alert was called.  CT head revealed 3.7 x 3.7 x 5.6 cm left frontal intraparenchymal hematoma with surrounding vasogenic edema resulting in mild mass effect with approximately 0.3 cm rightward midline shift.  TNK was not given.  Patient was also noted to have elevated blood pressure of /106.  Remaining vital signs within normal limits.  Patient was treated with labetalol initially 10 mg IV x 1 followed by 20 mg with improvement of blood pressure.  She was started on nicardipine drip.  She was admitted to intensive care unit for closer observation with consultation to neurology/neuro surgery/critical care.     Hospital Course:   Brief Hospital  course, Ms. Ching was admitted for right arm weakness, slurred speech found to have a left frontal intraparenchymal hematoma with surrounding vasogenic edema.  She was admitted to the ICU she had significantly elevated blood pressure requiring nicardipine drip.  Was seen by neurosurgery, neurology, pulmonary critical care.  CT scan and CTA reviewed discussed with Dr. Wade from neurosurgery.  There is left frontal intraparenchymal hemorrhage with extension to or just beneath the surface, there is a small spot sign within this hemorrhage on the CT angiogram.  There is no clear evidence of a vascular malformation.  Dr. Wade recommends transfer to [facility] for a diagnostic angiogram and decision to be made for benefit of surgical evacuation in this case pending angiogram.    Family agreeable to transfer for diagnostic angiogram and neuro intensive care at [facility].    Called hospitalist to Mike with signout.    Consultations:   Neurology  Neurosurgery  Pulmonary critical care    Procedures: n/a    Complications: n/a    Discharge Condition: Fair    Discharge Medications:      Discharge Medications        START taking these medications        Instructions Prescription details   acetaminophen 325 MG Tabs  Commonly known as: Tylenol      Take 2 tablets (650 mg total) by mouth every 4 (four) hours as needed.   Refills: 0     amLODIPine 5 MG Tabs  Commonly known as: Norvasc      Take 1 tablet (5 mg total) by mouth daily.   Refills: 0     bisacodyl 10 MG Supp  Commonly known as: Dulcolax      Place 1 suppository (10 mg total) rectally daily as needed.   Refills: 0     niCARdipine in sodium chloride 0.86% 20 mg/200mL Soln  Commonly known as: carDENE      Inject 5-15 mg/hr into the vein continuous.   Refills: 0     ondansetron 4 MG/2ML Soln  Commonly known as: Zofran      Inject 2 mL (4 mg total) into the vein every 6 (six) hours as needed.   Refills: 0     Polyethylene Glycol 3350 17 g Pack  Commonly known as:  MIRALAX      Take 17 g by mouth daily as needed (If no bowel movement in last 24 hours).   Refills: 0     Sennosides 17.2 MG Tabs      Take 1 tablet (17.2 mg total) by mouth nightly as needed (constipation, as needed if no bowel movement that day).   Refills: 0     sodium chloride 0.9%      Inject into the vein continuous.   Refills: 0     sodium chloride 0.9% PF 0.9% SOLN 10 mL with pantoprazole 40 MG SOLR 40 mg  Start taking on: March 21, 2024      Inject 40 mg into the vein daily.   Refills: 0            CONTINUE taking these medications        Instructions Prescription details   simvastatin 10 MG Tabs  Commonly known as: Zocor      Take 1 tablet (10 mg total) by mouth nightly.   Refills: 0            STOP taking these medications      polyethylene glycol (PEG 3350-KCl-NaBcb-NaCl-NaSulf) 240 g Solr  Commonly known as: Colyte with Flavor Packs                     Other Discharge Instructions: per Neuro intensive care    GAGE DEAL MD  3/20/2024  3:52 PM    > 35 min     Electronically signed by Gage Deal MD on 3/20/2024  3:59 PM              Physical Therapy Notes (last 72 hours)      Physical Therapy Note signed by Catina Alva PT at 3/20/2024  1:55 PM  Version 1 of 1    Author: Catina Alva PT Service: Physical Medicine and Rehabilitation Author Type: Physical Therapist    Filed: 3/20/2024  1:55 PM Date of Service: 3/20/2024  1:53 PM Status: Signed    : Catina Alva PT (Physical Therapist)       PT order received, chart reviewed. Patient will not be seen today, pt has follow up CT tomorrow to assess stability of bleed. Per neurosurgery note today:    \"62 y/o female without a pertinent hx of HTN who presented to the ED overnight with expressive aphasia at right sided weakness (UE>LE), CT with left large acute IPH with mass affect and midline shift     Plan:     1.  Hold anticoagulation/antiplatelet therapy  2.  Systolic blood pressure less than 140  3.  Medical management per critical care  and hospitalist  4.  Repeat head CT in the morning.  If a change in neurologic status, complete stat head CT and notify neurosurgery.  5.  Dr. Wade to discuss further with Dr. Romero if angiogram is recommended to rule out underlying lesion  6.  Discussed with Dr. Wade\"    Will check back tomorrow.                Occupational Therapy Notes (last 72 hours)  Notes from 3/17/2024  6:36 PM through 3/20/2024  6:36 PM   No notes of this type exist for this encounter.     Video Swallow Study Notes    No notes of this type exist for this encounter.        SLP Note - SLP Notes      SLP Note signed by Beatris Howe SLP at 3/20/2024 10:49 AM  Version 1 of 1    Author: Beatris Howe SLP Service: — Author Type: Speech and Language Pathologist    Filed: 3/20/2024 10:49 AM Date of Service: 3/20/2024 10:00 AM Status: Signed    : Beatris Howe SLP (Speech and Language Pathologist)       ADULT SWALLOWING EVALUATION    ASSESSMENT    ASSESSMENT/OVERALL IMPRESSION:  PPE REQUIRED. THIS THERAPIST WORE GLOVES, DROPLET MASK, AND GOGGLES FOR DURATION OF EVALUATION. HANDS WASHED UPON ENTRANCE/EXIT.    SLP BSSE orders received and acknowledged. A swallow evaluation warranted secondary to stroke protocol. Pt afebrile with weak vocal quality, on room air, with oxygen saturation at 98. Pt with no prior hx of dysphagia at [facility]. Pt positioned upright in bed with family at bedside. Pt with no complaints of pain, RN aware. Oral [facility] exam completed and R sided facial droop/R lingual deviation with overall weakness and reduced ROM/rate of motion observed. Pt with adequate oral acceptance and bilabial seal across trials of mildly thick liquids and puree solids. Anterior spillage from R side observed with hard solids and thin liquids.  Pt with intact bite, prolonged mastication of solids, and increased DILLON. Pt's swallow response appears delayed with reduced hyolaryngeal elevation/excursion. No clinical signs of aspiration (e.g., immediate/delayed  throat clear, immediate/delayed cough, wet vocal quality, increased O2 effort) observed across all trials of puree, soft solids, and mildly thick liquids. Overt signs/symptoms of aspiration observed with thin liquids as evidenced by immediate coughing and throat clearing. Trials discontinued. Oral/buccal cavities clear of residue following all trials. Oxygen status remained >95 t/o the entire evaluation.     At this time, pt presents with mild oral dysphagia and probable pharyngeal dysfunction. Recommend a bite sized diet and mildly thick liquids with strict adherence to safe swallowing compensatory strategies. Results and recommendations reviewed with RN, pt, and family. Pt/pt's family v/u to all results/recommendations. Recommendations remain written on whiteboard.     PLAN: SLP to f/u x4 meal assessments, monitor CXR, VFSS if clinically warranted, and SLE in future session.     RECOMMENDATIONS   Diet Recommendations - Solids: Mechanical soft chopped/ Soft & Bite Sized  Diet Recommendations - Liquids: Nectar thick liquids/ Mildly thick    Compensatory Strategies Recommended: No straws;Slow rate;Alternate consistencies;Small bites and sips  Aspiration Precautions: Upright position;Slow rate;Small bites and sips;No straw  Medication Administration Recommendations: Crushed in puree  Treatment Plan/Recommendations: Aspiration precautions;Communication evaluation    HISTORY   MEDICAL HISTORY  Reason for Referral: Stroke protocol    Problem List  Principal Problem:    Intraparenchymal hematoma of brain, left, without loss of consciousness, initial encounter ([facility])  Active Problems:    Hypertensive emergency      Past Medical History  Past Medical History:   Diagnosis Date    Diverticulosis large intestine w/o perforation or abscess w/o bleeding 5/18/2018    High cholesterol     Hyperlipidemia     Internal hemorrhoids 5/18/2018       Prior Living Situation: Home with spouse  Diet Prior to Admission: Regular;Thin  liquids  Precautions: Aspiration    Patient/Family Goals: Pt would like to eat    SWALLOWING HISTORY  Current Diet Consistency: NPO  Dysphagia History: None at [facility]    Imaging Results:     CXR 3/20/24:  CONCLUSION:      No radiographic evidence of acute cardiopulmonary process.      A preliminary report was issued by the [facility] Radiology teleradiology service. There are no clinically actionable discrepancies.            Dictated by (CST): Giselle Alexander MD on 3/20/2024 at 7:46 AM       Finalized by (CST): Giselle Alexander MD on 3/20/2024 at 7:46 AM       CT BRAIN 3/20/24:  CONCLUSION:   1. Large acute 4.5 x 3.5 cm intraparenchymal hematoma centered at the periphery of the left frontal lobe.  Although the hematoma largely appears similar in size and morphology since examination from approximately 6.5 hours prior, there is a small   curvilinear band of acute hemorrhagic components along the posterior margin of the hematoma, which are new since prior.  Mild surrounding vasogenic edema appears unchanged.  Stable left cerebral hemispheric mass effect with mild 2-3 mm left-to-right   midline shift.  Continued close follow-up to resolution is advised.   2. Intracranial atherosclerosis.         Dictated by (CST): Finesse Martinez MD on 3/20/2024 at 7:54 AM       Finalized by (CST): Finesse Martinez MD on 3/20/2024 at 7:59 AM              OBJECTIVE   ORAL MOTOR EXAMINATION  Dentition: Natural;Functional  Symmetry: Reduced right facial;Lingual deviation to right  Strength:  (reduced)     Range of Motion:  (reduced)  Rate of Motion: Reduced    Voice Quality: Weak  Respiratory Status: Unlabored  Consistencies Trialed: Thin liquids;Nectar thick liquids;Puree;Soft solid;Hard solid  Method of Presentation: Self presentation;Staff/Clinician assistance;Spoon;Cup;Single sips  Patient Positioning: Upright;Midline    Oral Phase of Swallow: Impaired  Bolus Retrieval: Intact  Bilabial Seal: Impaired  Bolus Formation: Impaired  Bolus  Propulsion: Impaired  Mastication: Impaired  Retention: Impaired    Pharyngeal Phase of Swallow: Impaired  Laryngeal Elevation Timing: Appears impaired  Laryngeal Elevation Strength: Appears impaired     (Please note: Silent aspiration cannot be evaluated clinically. Videofluoroscopic Swallow Study is required to rule-out silent aspiration.)    Esophageal Phase of Swallow: No complaints consistent with possible esophageal involvement    GOALS  Goal #1 The patient will tolerate bite sized consistency and mildly thick liquids without overt signs or symptoms of aspiration with 100 % accuracy over 2 session(s).  In Progress   Goal #2 The patient/family/caregiver will demonstrate understanding and implementation of aspiration precautions and swallow strategies independently over 4 session(s).    In Progress   Goal #3 The patient will tolerate trial upgrade of easy to chew/regular consistency and thin liquids without overt signs or symptoms of aspiration with 100 % accuracy over 3-4 session(s).  In Progress   Goal #4 The patient will utilize compensatory strategies as outlined by  BSSE (clinical evaluation) including Slow rate, Small bites, Small sips, Alternate liquids/solids, No straws, Upright 90 degrees, Feed patient with moderate assistance 100 % of the time across 2 sessions.    In Progress   Goal #5 SLE in future session.     In Progress       FOLLOW UP  Treatment Plan/Recommendations: Aspiration precautions;Communication evaluation  Number of Visits to Meet Established Goals: 4  Follow Up Needed (Documentation Required): Yes  SLP Follow-up Date: 03/21/24    Thank you for your referral.   If you have any questions, please contact TERE Bass M.S. CCC-SLP  Speech Language Pathologist  Phone Number Ext. [phone]      Electronically signed by Beatris Howe SLP on 3/20/2024 10:49 AM           Immunizations     Name Date      Covid-19 Moderna Bivalent 12+ years 10/17/22     Covid-19 Pfizer 04/16/22      Covid-19 Pfizer 09/27/21     Covid-19 Pfizer 02/15/21     Covid-19 Pfizer 01/25/21       Multidisciplinary Problems     Active Goals        Problem: Patient/Family Goals    Goal Priority Disciplines Outcome Interventions   Patient/Family Long Term Goal     Interdisciplinary Progressing    Description: Patient's Long Term Goal: To regain strength    Interventions:  - Work with physical therapy   - Perform ROM exercises   - See additional Care Plan goals for specific interventions   Patient/Family Short Term Goal     Interdisciplinary Progressing    Description: Patient's Short Term Goal: To be able to eat     Interventions:   - Work with Speech therapy  - Perform swallowing and speech exercises   - See additional Care Plan goals for specific interventions

## (undated) NOTE — LETTER
March 20, 2024    [facility] [id number]        To Whom It May Concern:     Because of an acute life-threatening medical emergency, Leanne Ching was not able to travel on March 20, 2024 as planned.  She is currently in critical condition in the intensive care unit at [facility] in [place], [place].    This letter is to kindly request that funds associated with the purchase of this travel event be refunded.     Please feel free to contact the office with any questions.   Thank you,    Riley Angeles MD  [phone]

## (undated) NOTE — LETTER
[address]  [address]  Authorization for Surgical Operation and Procedure     Date:_03/21/2024_____                                                                                                         Time:__1915_____  I hereby authorize Left Craniotomy for Intraparenchymal hematoma evacuation, my physician and his/her assistants (if applicable), which may include medical students, residents, and/or fellows, to perform the following surgical operation/ procedure and administer such anesthesia as may be determined necessary by my physician:  Operation/Procedure name (s)  on Leanne Ching   2.   I recognize that during the surgical operation/procedure, unforeseen conditions may necessitate additional or different procedures than those listed above.  I, therefore, further authorize and request that the above-named surgeon, assistants, or designees perform such procedures as are, in their judgment, necessary and desirable.    3.   My surgeon/physician has discussed prior to my surgery the potential benefits, risks and side effects of this procedure; the likelihood of achieving goals; and potential problems that might occur during recuperation.  They also discussed reasonable alternatives to the procedure, including risks, benefits, and side effects related to the alternatives and risks related to not receiving this procedure.  I have had all my questions answered and I acknowledge that no guarantee has been made as to the result that may be obtained.    4.   Should the need arise during my operation/procedure, which includes change of level of care prior to discharge, I also consent to the administration of blood and/or blood products.  Further, I understand that despite careful testing and screening of blood or blood products by collecting agencies, I may still be subject to ill effects as a result of receiving a blood transfusion and/or blood products.  The following are some,  but not all, of the potential risks that can occur: fever and allergic reactions, hemolytic reactions, transmission of diseases such as Hepatitis, AIDS and Cytomegalovirus (CMV) and fluid overload.  In the event that I wish to have an autologous transfusion of my own blood, or a directed donor transfusion, I will discuss this with my physician.  Check only if Refusing Blood or Blood Products  I understand refusal of blood or blood products as deemed necessary by my physician may have serious consequences to my condition to include possible death. I hereby assume responsibility for my refusal and release the hospital, its personnel, and my physicians from any responsibility for the consequences of my refusal.          o  Refuse      5.   I authorize the use of any specimen, organs, tissues, body parts or foreign objects that may be removed from my body during the operation/procedure for diagnosis, research or teaching purposes and their subsequent disposal by hospital authorities.  I also authorize the release of specimen test results and/or written reports to my treating physician on the hospital medical staff or other referring or consulting physicians involved in my care, at the discretion of the Pathologist or my treating physician.    6.   I consent to the photographing or videotaping of the operations or procedures to be performed, including appropriate portions of my body for medical, scientific, or educational purposes, provided my identity is not revealed by the pictures or by descriptive texts accompanying them.  If the procedure has been photographed/videotaped, the surgeon will obtain the original picture, image, videotape or CD.  The hospital will not be responsible for storage, release or maintenance of the picture, image, tape or CD.    7.   I consent to the presence of a  or observers in the operating room as deemed necessary by my physician or their designees.    8.   I recognize  that in the event my procedure results in extended X-Ray/fluoroscopy time, I may develop a skin reaction.    9. If I have a Do Not Attempt Resuscitation (DNAR) order in place, that status will be suspended while in the operating room, procedural suite, and during the recovery period unless otherwise explicitly stated by me (or a person authorized to consent on my behalf). The surgeon or my attending physician will determine when the applicable recovery period ends for purposes of reinstating the DNAR order.  10. Patients having a sterilization procedure: I understand that if the procedure is successful the results will be permanent and it will therefore be impossible for me to inseminate, conceive, or bear children.  I also understand that the procedure is intended to result in sterility, although the result has not been guaranteed.   11. I acknowledge that my physician has explained sedation/analgesia administration to me including the risk and benefits I consent to the administration of sedation/analgesia as may be necessary or desirable in the judgment of my physician.    I CERTIFY THAT I HAVE READ AND FULLY UNDERSTAND THE ABOVE CONSENT TO OPERATION and/or OTHER PROCEDURE.    _________________________________________  __________________________________  Signature of Patient     Signature of Responsible Person         ___________________________________         Printed Name of Responsible Person           _________________________________                 Relationship to Patient  _________________________________________  ______________________________  Signature of Witness          Date  Time      Patient Name: Leanne Ching     : 1960                 Printed: 2024     Medical Record #: [id number]                     Page 1 of [address] [place] [address]  [address]    Consent for Anesthesia    I, Leanne Ching agree to be cared for by an  anesthesiologist, who is specially trained to monitor me and give me medicine to put me to sleep or keep me comfortable during my procedure    I understand that my anesthesiologist is not an employee or agent of [facility] or [employer]. He or she works for [employer].    As the patient asking for anesthesia services, I agree to:  Allow the anesthesiologist (anesthesia doctor) to give me medicine and do additional procedures as necessary. Some examples are: Starting or using an “IV” to give me medicine, fluids or blood during my procedure, and having a breathing tube placed to help me breathe when I’m asleep (intubation). In the event that my heart stops working properly, I understand that my anesthesiologist will make every effort to sustain my life, unless otherwise directed by [facility] Do Not Resuscitate documents.  Tell my anesthesia doctor before my procedure:  If I am pregnant.  The last time that I ate or drank.  All of the medicines I take (including prescriptions, herbal supplements, and pills I can buy without a prescription (including street drugs/illegal medications). Failure to inform my anesthesiologist about these medicines may increase my risk of anesthetic complications.  If I am allergic to anything or have had a reaction to anesthesia before.  I understand how the anesthesia medicine will help me (benefits).  I understand that with any type of anesthesia medicine there are risks:  The most common risks are: nausea, vomiting, sore throat, muscle soreness, damage to my eyes, mouth, or teeth (from breathing tube placement).  Rare risks include: remembering what happened during my procedure, allergic reactions to medications, injury to my airway, heart, lungs, vision, nerves, or muscles and in extremely rare instances death.  My doctor has explained to me other choices available to me for my care (alternatives).  Pregnant Patients (“epidural”):  I understand  that the risks of having an epidural (medicine given into my back to help control pain during labor), include itching, low blood pressure, difficulty urinating, headache or slowing of the baby’s heart. Very rare risks include infection, bleeding, seizure, irregular heart rhythms and nerve injury.  Regional Anesthesia (“spinal”, “epidural”, & “nerve blocks”):  I understand that rare but potential complications include headache, bleeding, infection, seizure, irregular heart rhythms, and nerve injury.    I can change my mind about having anesthesia services at any time before I get the medicine.    _____________________________________________________________________________  Patient (or Representative) Signature/Relationship to Patient  Date   Time    _____________________________________________________________________________   Name (if used)    Language/Organization   Time    _____________________________________________________________________________  Anesthesiologist Signature     Date   Time  I have discussed the procedure and information above with the patient (or patient’s representative) and answered their questions. The patient or their representative has agreed to have anesthesia services.    _____________________________________________________________________________  Witness        Date   Time  I have verified that the signature is that of the patient or patient’s representative, and that it was signed before the procedure  Patient Name: Leanne Ching     : 1960                 Printed: 2024     Medical Record #: [id number]                     Page 2 of 2

## (undated) NOTE — LETTER
[address]  [address]  Authorization for Surgical Operation and Procedure     Date:___________                                                                                                         Time:__________  I hereby authorize Surgeon(s):  Jimbo Romero MD, my physician and his/her assistants (if applicable), which may include medical students, residents, and/or fellows, to perform the following surgical operation/ procedure and administer such anesthesia as may be determined necessary by my physician:  Operation/Procedure name (s) Procedure(s):  LEFT CRANIOTOMY FOR HEMATOMA EVACUATION on Leanne Ching   2.   I recognize that during the surgical operation/procedure, unforeseen conditions may necessitate additional or different procedures than those listed above.  I, therefore, further authorize and request that the above-named surgeon, assistants, or designees perform such procedures as are, in their judgment, necessary and desirable.    3.   My surgeon/physician has discussed prior to my surgery the potential benefits, risks and side effects of this procedure; the likelihood of achieving goals; and potential problems that might occur during recuperation.  They also discussed reasonable alternatives to the procedure, including risks, benefits, and side effects related to the alternatives and risks related to not receiving this procedure.  I have had all my questions answered and I acknowledge that no guarantee has been made as to the result that may be obtained.    4.   Should the need arise during my operation/procedure, which includes change of level of care prior to discharge, I also consent to the administration of blood and/or blood products.  Further, I understand that despite careful testing and screening of blood or blood products by collecting agencies, I may still be subject to ill effects as a result of receiving a blood transfusion and/or blood products.   The following are some, but not all, of the potential risks that can occur: fever and allergic reactions, hemolytic reactions, transmission of diseases such as Hepatitis, AIDS and Cytomegalovirus (CMV) and fluid overload.  In the event that I wish to have an autologous transfusion of my own blood, or a directed donor transfusion, I will discuss this with my physician.  Check only if Refusing Blood or Blood Products  I understand refusal of blood or blood products as deemed necessary by my physician may have serious consequences to my condition to include possible death. I hereby assume responsibility for my refusal and release the hospital, its personnel, and my physicians from any responsibility for the consequences of my refusal.          o  Refuse      5.   I authorize the use of any specimen, organs, tissues, body parts or foreign objects that may be removed from my body during the operation/procedure for diagnosis, research or teaching purposes and their subsequent disposal by hospital authorities.  I also authorize the release of specimen test results and/or written reports to my treating physician on the hospital medical staff or other referring or consulting physicians involved in my care, at the discretion of the Pathologist or my treating physician.    6.   I consent to the photographing or videotaping of the operations or procedures to be performed, including appropriate portions of my body for medical, scientific, or educational purposes, provided my identity is not revealed by the pictures or by descriptive texts accompanying them.  If the procedure has been photographed/videotaped, the surgeon will obtain the original picture, image, videotape or CD.  The hospital will not be responsible for storage, release or maintenance of the picture, image, tape or CD.    7.   I consent to the presence of a  or observers in the operating room as deemed necessary by my physician or their  designees.    8.   I recognize that in the event my procedure results in extended X-Ray/fluoroscopy time, I may develop a skin reaction.    9. If I have a Do Not Attempt Resuscitation (DNAR) order in place, that status will be suspended while in the operating room, procedural suite, and during the recovery period unless otherwise explicitly stated by me (or a person authorized to consent on my behalf). The surgeon or my attending physician will determine when the applicable recovery period ends for purposes of reinstating the DNAR order.  10. Patients having a sterilization procedure: I understand that if the procedure is successful the results will be permanent and it will therefore be impossible for me to inseminate, conceive, or bear children.  I also understand that the procedure is intended to result in sterility, although the result has not been guaranteed.   11. I acknowledge that my physician has explained sedation/analgesia administration to me including the risk and benefits I consent to the administration of sedation/analgesia as may be necessary or desirable in the judgment of my physician.    I CERTIFY THAT I HAVE READ AND FULLY UNDERSTAND THE ABOVE CONSENT TO OPERATION and/or OTHER PROCEDURE.    _________________________________________  __________________________________  Signature of Patient     Signature of Responsible Person         ___________________________________         Printed Name of Responsible Person           _________________________________                 Relationship to Patient  _________________________________________  ______________________________  Signature of Witness          Date  Time      Patient Name: Leanne Ching     : 1960                 Printed: 2024     Medical Record #: [id number]                     Page 1 of [address] [place] [address] [place]  [address]    Consent for Anesthesia    I, Leanne Ching agree  to be cared for by an anesthesiologist, who is specially trained to monitor me and give me medicine to put me to sleep or keep me comfortable during my procedure    I understand that my anesthesiologist is not an employee or agent of [facility] or [employer]. He or she works for [employer].    As the patient asking for anesthesia services, I agree to:  Allow the anesthesiologist (anesthesia doctor) to give me medicine and do additional procedures as necessary. Some examples are: Starting or using an “IV” to give me medicine, fluids or blood during my procedure, and having a breathing tube placed to help me breathe when I’m asleep (intubation). In the event that my heart stops working properly, I understand that my anesthesiologist will make every effort to sustain my life, unless otherwise directed by [facility] Do Not Resuscitate documents.  Tell my anesthesia doctor before my procedure:  If I am pregnant.  The last time that I ate or drank.  All of the medicines I take (including prescriptions, herbal supplements, and pills I can buy without a prescription (including street drugs/illegal medications). Failure to inform my anesthesiologist about these medicines may increase my risk of anesthetic complications.  If I am allergic to anything or have had a reaction to anesthesia before.  I understand how the anesthesia medicine will help me (benefits).  I understand that with any type of anesthesia medicine there are risks:  The most common risks are: nausea, vomiting, sore throat, muscle soreness, damage to my eyes, mouth, or teeth (from breathing tube placement).  Rare risks include: remembering what happened during my procedure, allergic reactions to medications, injury to my airway, heart, lungs, vision, nerves, or muscles and in extremely rare instances death.  My doctor has explained to me other choices available to me for my care (alternatives).  Pregnant Patients  (“epidural”):  I understand that the risks of having an epidural (medicine given into my back to help control pain during labor), include itching, low blood pressure, difficulty urinating, headache or slowing of the baby’s heart. Very rare risks include infection, bleeding, seizure, irregular heart rhythms and nerve injury.  Regional Anesthesia (“spinal”, “epidural”, & “nerve blocks”):  I understand that rare but potential complications include headache, bleeding, infection, seizure, irregular heart rhythms, and nerve injury.    I can change my mind about having anesthesia services at any time before I get the medicine.    _____________________________________________________________________________  Patient (or Representative) Signature/Relationship to Patient  Date   Time    _____________________________________________________________________________   Name (if used)    Language/Organization   Time    _____________________________________________________________________________  Anesthesiologist Signature     Date   Time  I have discussed the procedure and information above with the patient (or patient’s representative) and answered their questions. The patient or their representative has agreed to have anesthesia services.    _____________________________________________________________________________  Witness        Date   Time  I have verified that the signature is that of the patient or patient’s representative, and that it was signed before the procedure  Patient Name: Leanne Ching     : 1960                 Printed: 2024     Medical Record #: [id number]                     Page 2 of 2

## (undated) NOTE — LETTER
[address]  [address]  Consent for Procedure/Sedation  Date: 3/21/2024         Time: 07:58AM    I hereby authorize Dr. Philip, my physician and his/her assistants (if applicable), which may include medical students, residents, and/or fellows, to perform the following surgical operation/ procedure and administer such anesthesia as may be determined necessary by my physician: diagnostic cerebral angiogram on Leanne Ching  2.   I recognize that during the surgical operation/procedure, unforeseen conditions may necessitate additional or different procedures than those listed above.  I, therefore, further authorize and request that the above-named surgeon, assistants, or designees perform such procedures as are, in their judgment, necessary and desirable.    3.   My surgeon/physician has discussed prior to my surgery the potential benefits, risks and side effects of this procedure; the likelihood of achieving goals; and potential problems that might occur during recuperation.  They also discussed reasonable alternatives to the procedure, including risks, benefits, and side effects related to the alternatives and risks related to not receiving this procedure.  I have had all my questions answered and I acknowledge that no guarantee has been made as to the result that may be obtained.    4.   Should the need arise during my operation/procedure, which includes change of level of care prior to discharge, I also consent to the administration of blood and/or blood products.  Further, I understand that despite careful testing and screening of blood or blood products by collecting agencies, I may still be subject to ill effects as a result of receiving a blood transfusion and/or blood products.  The following are some, but not all, of the potential risks that can occur: fever and allergic reactions, hemolytic reactions, transmission of diseases such as Hepatitis, AIDS and Cytomegalovirus (CMV)  and fluid overload.  In the event that I wish to have an autologous transfusion of my own blood, or a directed donor transfusion, I will discuss this with my physician.   Check only if Refusing Blood or Blood Products  I understand refusal of blood or blood products as deemed necessary by my physician may have serious consequences to my condition to include possible death. I hereby assume responsibility for my refusal and release the hospital, its personnel, and my physicians from any responsibility for the consequences of my refusal.         o  Refuse         5.   I authorize the use of any specimen, organs, tissues, body parts or foreign objects that may be removed from my body during the operation/procedure for diagnosis, research or teaching purposes and their subsequent disposal by hospital authorities.  I also authorize the release of specimen test results and/or written reports to my treating physician on the hospital medical staff or other referring or consulting physicians involved in my care, at the discretion of the Pathologist or my treating physician.    6.   I consent to the photographing or videotaping of the operations or procedures to be performed, including appropriate portions of my body for medical, scientific, or educational purposes, provided my identity is not revealed by the pictures or by descriptive texts accompanying them.  If the procedure has been photographed/videotaped, the surgeon will obtain the original picture, image, videotape or CD.  The hospital will not be responsible for storage, release or maintenance of the picture, image, tape or CD.    7.   I consent to the presence of a  or observers in the operating room as deemed necessary by my physician or their designees.    8.   I recognize that in the event my procedure results in extended X-Ray/fluoroscopy time, I may develop a skin reaction.    9. If I have a Do Not Attempt Resuscitation (DNAR) order in place,  that status will be suspended while in the operating room, procedural suite, and during the recovery period unless otherwise explicitly stated by me (or a person authorized to consent on my behalf). The surgeon or my attending physician will determine when the applicable recovery period ends for purposes of reinstating the DNAR order.  10. Patients having a sterilization procedure: I understand that if the procedure is successful the results will be permanent and it will therefore be impossible for me to inseminate, conceive, or bear children.  I also understand that the procedure is intended to result in sterility, although the result has not been guaranteed.   11. I acknowledge that my physician has explained sedation/analgesia administration to me including the risk and benefits I consent to the administration of sedation/analgesia as may be necessary or desirable in the judgment of my physician.    I CERTIFY THAT I HAVE READ AND FULLY UNDERSTAND THE ABOVE CONSENT TO OPERATION and/or OTHER PROCEDURE.        ____________________________________       _________________________________      ______________________________  Signature of Patient         Signature of Responsible Person        Printed Name of Responsible Person        ____________________________________      _________________________________      ______________________________       Signature of Witness          Relationship to Patient                       Date                                       Time  Patient Name: Leanne Ching  : 1960    Reviewed: 2024   Printed: 2024  Medical Record #: [id number] Page 1 of 1

## (undated) NOTE — LETTER
[address]  [address]  Authorization for Surgical Operation and Procedure     Date:___________                                                                                                         Time:__________  I hereby authorize Surgeon(s):  Jimbo Romero MD, my physician and his/her assistants (if applicable), which may include medical students, residents, and/or fellows, to perform the following surgical operation/ procedure and administer such anesthesia as may be determined necessary by my physician:  Operation/Procedure name (s) Procedure(s):  LEFT CRANIOTOMY FOR HEMATOMA EVACUATION WITH STEALTH NAVIAGTION on Leanne Ching   2.   I recognize that during the surgical operation/procedure, unforeseen conditions may necessitate additional or different procedures than those listed above.  I, therefore, further authorize and request that the above-named surgeon, assistants, or designees perform such procedures as are, in their judgment, necessary and desirable.    3.   My surgeon/physician has discussed prior to my surgery the potential benefits, risks and side effects of this procedure; the likelihood of achieving goals; and potential problems that might occur during recuperation.  They also discussed reasonable alternatives to the procedure, including risks, benefits, and side effects related to the alternatives and risks related to not receiving this procedure.  I have had all my questions answered and I acknowledge that no guarantee has been made as to the result that may be obtained.    4.   Should the need arise during my operation/procedure, which includes change of level of care prior to discharge, I also consent to the administration of blood and/or blood products.  Further, I understand that despite careful testing and screening of blood or blood products by collecting agencies, I may still be subject to ill effects as a result of receiving a blood transfusion  and/or blood products.  The following are some, but not all, of the potential risks that can occur: fever and allergic reactions, hemolytic reactions, transmission of diseases such as Hepatitis, AIDS and Cytomegalovirus (CMV) and fluid overload.  In the event that I wish to have an autologous transfusion of my own blood, or a directed donor transfusion, I will discuss this with my physician.  Check only if Refusing Blood or Blood Products  I understand refusal of blood or blood products as deemed necessary by my physician may have serious consequences to my condition to include possible death. I hereby assume responsibility for my refusal and release the hospital, its personnel, and my physicians from any responsibility for the consequences of my refusal.          o  Refuse      5.   I authorize the use of any specimen, organs, tissues, body parts or foreign objects that may be removed from my body during the operation/procedure for diagnosis, research or teaching purposes and their subsequent disposal by hospital authorities.  I also authorize the release of specimen test results and/or written reports to my treating physician on the hospital medical staff or other referring or consulting physicians involved in my care, at the discretion of the Pathologist or my treating physician.    6.   I consent to the photographing or videotaping of the operations or procedures to be performed, including appropriate portions of my body for medical, scientific, or educational purposes, provided my identity is not revealed by the pictures or by descriptive texts accompanying them.  If the procedure has been photographed/videotaped, the surgeon will obtain the original picture, image, videotape or CD.  The hospital will not be responsible for storage, release or maintenance of the picture, image, tape or CD.    7.   I consent to the presence of a  or observers in the operating room as deemed necessary by my  physician or their designees.    8.   I recognize that in the event my procedure results in extended X-Ray/fluoroscopy time, I may develop a skin reaction.    9. If I have a Do Not Attempt Resuscitation (DNAR) order in place, that status will be suspended while in the operating room, procedural suite, and during the recovery period unless otherwise explicitly stated by me (or a person authorized to consent on my behalf). The surgeon or my attending physician will determine when the applicable recovery period ends for purposes of reinstating the DNAR order.  10. Patients having a sterilization procedure: I understand that if the procedure is successful the results will be permanent and it will therefore be impossible for me to inseminate, conceive, or bear children.  I also understand that the procedure is intended to result in sterility, although the result has not been guaranteed.   11. I acknowledge that my physician has explained sedation/analgesia administration to me including the risk and benefits I consent to the administration of sedation/analgesia as may be necessary or desirable in the judgment of my physician.    I CERTIFY THAT I HAVE READ AND FULLY UNDERSTAND THE ABOVE CONSENT TO OPERATION and/or OTHER PROCEDURE.    _________________________________________  __________________________________  Signature of Patient     Signature of Responsible Person         ___________________________________         Printed Name of Responsible Person           _________________________________                 Relationship to Patient  _________________________________________  ______________________________  Signature of Witness          Date  Time      Patient Name: Leanne Ching     : 1960                 Printed: 2024     Medical Record #: [id number]                     Page 1 of [address] [place] [address]  [id number]    Consent for  Anesthesia    I, Leanne Ching agree to be cared for by an anesthesiologist, who is specially trained to monitor me and give me medicine to put me to sleep or keep me comfortable during my procedure    I understand that my anesthesiologist is not an employee or agent of [facility] or [employer]. He or she works for [employer].    As the patient asking for anesthesia services, I agree to:  Allow the anesthesiologist (anesthesia doctor) to give me medicine and do additional procedures as necessary. Some examples are: Starting or using an “IV” to give me medicine, fluids or blood during my procedure, and having a breathing tube placed to help me breathe when I’m asleep (intubation). In the event that my heart stops working properly, I understand that my anesthesiologist will make every effort to sustain my life, unless otherwise directed by [facility] Do Not Resuscitate documents.  Tell my anesthesia doctor before my procedure:  If I am pregnant.  The last time that I ate or drank.  All of the medicines I take (including prescriptions, herbal supplements, and pills I can buy without a prescription (including street drugs/illegal medications). Failure to inform my anesthesiologist about these medicines may increase my risk of anesthetic complications.  If I am allergic to anything or have had a reaction to anesthesia before.  I understand how the anesthesia medicine will help me (benefits).  I understand that with any type of anesthesia medicine there are risks:  The most common risks are: nausea, vomiting, sore throat, muscle soreness, damage to my eyes, mouth, or teeth (from breathing tube placement).  Rare risks include: remembering what happened during my procedure, allergic reactions to medications, injury to my airway, heart, lungs, vision, nerves, or muscles and in extremely rare instances death.  My doctor has explained to me other choices available to me for my care  (alternatives).  Pregnant Patients (“epidural”):  I understand that the risks of having an epidural (medicine given into my back to help control pain during labor), include itching, low blood pressure, difficulty urinating, headache or slowing of the baby’s heart. Very rare risks include infection, bleeding, seizure, irregular heart rhythms and nerve injury.  Regional Anesthesia (“spinal”, “epidural”, & “nerve blocks”):  I understand that rare but potential complications include headache, bleeding, infection, seizure, irregular heart rhythms, and nerve injury.    I can change my mind about having anesthesia services at any time before I get the medicine.    _____________________________________________________________________________  Patient (or Representative) Signature/Relationship to Patient  Date   Time    _____________________________________________________________________________   Name (if used)    Language/Organization   Time    _____________________________________________________________________________  Anesthesiologist Signature     Date   Time  I have discussed the procedure and information above with the patient (or patient’s representative) and answered their questions. The patient or their representative has agreed to have anesthesia services.    _____________________________________________________________________________  Witness        Date   Time  I have verified that the signature is that of the patient or patient’s representative, and that it was signed before the procedure  Patient Name: Leanne Ching     : 1960                 Printed: 2024     Medical Record #: [id number]                     Page 2 of 2